# Patient Record
Sex: FEMALE | Race: BLACK OR AFRICAN AMERICAN | Employment: FULL TIME | URBAN - METROPOLITAN AREA
[De-identification: names, ages, dates, MRNs, and addresses within clinical notes are randomized per-mention and may not be internally consistent; named-entity substitution may affect disease eponyms.]

---

## 2017-02-21 ENCOUNTER — HOSPITAL ENCOUNTER (OUTPATIENT)
Dept: MAMMOGRAPHY | Age: 37
Discharge: HOME | End: 2017-02-21
Attending: INTERNAL MEDICINE
Payer: COMMERCIAL

## 2017-02-21 DIAGNOSIS — Z12.31 VISIT FOR SCREENING MAMMOGRAM: ICD-10-CM

## 2017-02-21 PROCEDURE — 77067 SCR MAMMO BI INCL CAD: CPT

## 2017-10-30 ENCOUNTER — HOSPITAL ENCOUNTER (OUTPATIENT)
Dept: SURGERY | Age: 37
Discharge: HOME | End: 2017-10-30
Attending: OBSTETRICS & GYNECOLOGY
Payer: COMMERCIAL

## 2017-10-30 VITALS
TEMPERATURE: 97.6 F | OXYGEN SATURATION: 99 % | SYSTOLIC BLOOD PRESSURE: 153 MMHG | DIASTOLIC BLOOD PRESSURE: 76 MMHG | WEIGHT: 280.25 LBS | HEART RATE: 78 BPM | BODY MASS INDEX: 47.85 KG/M2 | HEIGHT: 64 IN | RESPIRATION RATE: 16 BRPM

## 2017-10-30 LAB — HGB BLD-MCNC: 12.1 G/DL (ref 11.7–15.4)

## 2017-10-30 PROCEDURE — 85018 HEMOGLOBIN: CPT | Performed by: ANESTHESIOLOGY

## 2017-10-30 RX ORDER — PHENTERMINE HYDROCHLORIDE 37.5 MG/1
37.5 TABLET ORAL
COMMUNITY
Start: 2017-10-01 | End: 2017-10-30

## 2017-10-30 RX ORDER — NAPROXEN 500 MG/1
500 TABLET ORAL AS NEEDED
COMMUNITY
Start: 2017-09-13 | End: 2018-01-04

## 2017-10-30 RX ORDER — BISMUTH SUBSALICYLATE 262 MG
1 TABLET,CHEWABLE ORAL DAILY
COMMUNITY
End: 2019-12-16

## 2017-10-30 NOTE — PERIOP NOTES
Patient verified name, , and surgery as listed in [facility]. Type 2 surgery, walk in assessment complete. Labs per surgeon: none;   Labs per anesthesia protocol: hemoglobin; results pending  EKG: none needed per protocol    Hibiclens and instructions given per hospital policy. Patient provided with and instructed on educational handouts including Guide to Surgery, Pain Management, Hand Hygiene, Blood Transfusion Education, and [facility] Brochure. Patient answered medical/surgical history questions at their best of ability. All prior to admission medications documented in [facility]. Original medication prescription bottle YES visualized during patient appointment. Patient instructed to hold all vitamins 7 days prior to surgery and NSAIDS 5 days prior to surgery, patient verbalized understanding. Medications to be held: Naproxen hold for 5 days prior to surgery    Patient instructed to continue previous medications as prescribed prior to surgery and to take the following medications the day of surgery according to anesthesia guidelines with a small sip of water: Zoloft. Patient teach back successful and patient demonstrates knowledge of instructions.

## 2017-10-30 NOTE — PERIOP NOTES
HGB done today WNL    Recent Results (from the past 12 hour(s))   HEMOGLOBIN    Collection Time: 10/30/17  3:19 PM   Result Value Ref Range    HGB 12.1 11.7 - 15.4 g/dL

## 2017-11-06 NOTE — H&P
Gynecology History and Physical    Name: Fior Garcia MRN: [id number] SSN: [id number]    YOB: 1980  Age: 44 y.o. Sex: female       Subjective:      Chief complaint:  Abnormal uterine bleeding and Dysmenorrhea    Mellissa Jain is a 44 y.o.  female with a history of abnormal uterine bleeding and dysmenorrhea. Previous workup included Ultrasound which revealed fibroid(s) multiple small. Previous treatment measures included hormone therapy, nonsteroidal anti-inflammatory drugs (NSAIDs) and progesterone intrauterine device. She is admitted for Procedure(s) (LRB):  HYSTERECTOMY ROBOTIC ASSISTED WITH BILATERAL SALPINGECTOMY (N/A). The current method of family planning is abstinence. OB History      Para Term  AB Living    2         SAB TAB Ectopic Molar Multiple Live Births         2        Past Medical History:   Diagnosis Date    DVT of leg (deep venous thrombosis) (HCC)     Thought to be secondary to OCP use    Encounter for insertion of mirena IUD 2016    GERD (gastroesophageal reflux disease)     H/O seasonal allergies     Hypertension     past h/o Hypertension. Currently not on medication    Hypertension     h/o before , when she had DVT     Irregular periods     heavy periods    Menstrual migraine without status migrainosus, not intractable 10/19/2015    Migraines     around menstrul cycle    Morbid obesity ([facility] [address]     Psychiatric disorder     anxiety    Pulmonary embolus (HCC)     Thought to be secondary to OCP use    Sleep disorder     problems sleeping     Past Surgical History:   Procedure Laterality Date    BIOPSY OF BREAST, INCISIONAL      right    BIOPSY OF BREAST, NEEDLE CORE      left    HX BREAST LUMPECTOMY Right     BENIGN    HX CHOLECYSTECTOMY      HX TONSILLECTOMY N/A     as a child      Social History     Occupational History    Not on file.      Social History Main Topics    Smoking status: Former Smoker     Years: 2.00  Smokeless tobacco: Former User      Comment: vapor at one time     Alcohol use Yes      Comment: occasional, wine     Drug use: No    Sexual activity: Yes     Partners: Male     Birth control/ protection: , Condom     Family History   Problem Relation Age of Onset    Breast Cancer Maternal Grandmother     Hypertension Maternal Grandmother     High Cholesterol Mother     Heart Disease Maternal Grandfather     Alzheimer Paternal Grandmother         Allergies   Allergen Reactions    Lisinopril Other (comments)     Intolerance to side effects    Lorabid [Loracarbef] Other (comments)     PT IS NOT ALLERGIC    Lortab [Hydrocodone-Acetaminophen] Nausea and Vomiting     Prior to Admission medications    Medication Sig Start Date End Date Taking? Authorizing Provider   naproxen (NAPROSYN) 500 mg tablet Take 500 mg by mouth as needed. Indications: Pain 9/13/17   Historical Provider   multivitamin (ONE A DAY) tablet Take 1 Tab by mouth daily. Indications: VITAMIN DEFICIENCY PREVENTION    Historical Provider   OTHER daily. Black Seed oil    Historical Provider   norethindrone acetate (AYGESTIN) 5 mg tablet Take 2 Tabs by mouth daily. Patient taking differently: Take 10 mg by mouth daily. Indications: ABNORMAL UTERINE BLEEDING DUE TO HORMONAL IMBALANCE 10/19/17   Daniel Cox MD   sertraline (ZOLOFT) 25 mg tablet Take 25 mg by mouth daily. Take / use AM day of surgery  per anesthesia protocols. Indications: Generalized Anxiety Disorder    Historical Provider   phentermine (ADIPEX-P) 37.5 mg capsule Take 37.5 mg by mouth every morning. Historical Provider        Review of Systems:  A comprehensive review of systems was negative except for that written in the History of Present Illness. Objective: There were no vitals filed for this visit. Physical Exam:  Patient without distress.   Heart: Regular rate and rhythm  Lung: clear to auscultation throughout lung fields, no wheezes, no rales, no rhonchi and normal respiratory effort    Assessment:     Active Problems:    * No active hospital problems. *     Abnormal uterine bleeding with dysmenorrhea    Plan:     Procedure(s) (LRB):  HYSTERECTOMY ROBOTIC ASSISTED WITH BILATERAL SALPINGECTOMY (N/A)  Discussed the risks of surgery including the risks of bleeding, infection, deep vein thrombosis, and surgical injuries to internal organs including but not limited to the bowels, bladder, rectum, and female reproductive organs. The patient understands the risks; any and all questions were answered to the patient's satisfaction.     Signed By:  Faisal Horton MD     November 6, 2017

## 2017-11-08 ENCOUNTER — ANESTHESIA EVENT (OUTPATIENT)
Dept: SURGERY | Age: 37
End: 2017-11-08
Payer: COMMERCIAL

## 2017-11-09 ENCOUNTER — HOSPITAL ENCOUNTER (OUTPATIENT)
Age: 37
Discharge: HOME | End: 2017-11-10
Attending: OBSTETRICS & GYNECOLOGY | Admitting: OBSTETRICS & GYNECOLOGY
Payer: COMMERCIAL

## 2017-11-09 ENCOUNTER — ANESTHESIA (OUTPATIENT)
Dept: SURGERY | Age: 37
End: 2017-11-09
Payer: COMMERCIAL

## 2017-11-09 DIAGNOSIS — N94.6 DYSMENORRHEA: Primary | ICD-10-CM

## 2017-11-09 DIAGNOSIS — N92.0 MENORRHAGIA WITH REGULAR CYCLE: ICD-10-CM

## 2017-11-09 PROBLEM — N93.9 ABNORMAL UTERINE BLEEDING (AUB): Status: ACTIVE | Noted: 2017-11-09

## 2017-11-09 LAB
ABO + RH BLD: NORMAL
BLOOD GROUP ANTIBODIES SERPL: NORMAL
HCG UR QL: NEGATIVE
SPECIMEN EXP DATE BLD: NORMAL

## 2017-11-09 PROCEDURE — 77030032490 HC SLV COMPR SCD KNE COVD -B: Performed by: OBSTETRICS & GYNECOLOGY

## 2017-11-09 PROCEDURE — 74011000250 HC RX REV CODE- 250: Performed by: OBSTETRICS & GYNECOLOGY

## 2017-11-09 PROCEDURE — 77030022704 HC SUT VLOC COVD -B: Performed by: OBSTETRICS & GYNECOLOGY

## 2017-11-09 PROCEDURE — 77030008771 HC TU NG SALEM SUMP -A: Performed by: ANESTHESIOLOGY

## 2017-11-09 PROCEDURE — 77030031139 HC SUT VCRL2 J&J -A: Performed by: OBSTETRICS & GYNECOLOGY

## 2017-11-09 PROCEDURE — 77030002933 HC SUT MCRYL J&J -A: Performed by: OBSTETRICS & GYNECOLOGY

## 2017-11-09 PROCEDURE — 77030010545: Performed by: OBSTETRICS & GYNECOLOGY

## 2017-11-09 PROCEDURE — 74011250636 HC RX REV CODE- 250/636

## 2017-11-09 PROCEDURE — 77030031492 HC PRT ACC BLNT AIRSEAL CNMD -B: Performed by: OBSTETRICS & GYNECOLOGY

## 2017-11-09 PROCEDURE — 86900 BLOOD TYPING SEROLOGIC ABO: CPT | Performed by: ANESTHESIOLOGY

## 2017-11-09 PROCEDURE — 74011250637 HC RX REV CODE- 250/637: Performed by: ANESTHESIOLOGY

## 2017-11-09 PROCEDURE — 74011250636 HC RX REV CODE- 250/636: Performed by: OBSTETRICS & GYNECOLOGY

## 2017-11-09 PROCEDURE — 77030020782 HC GWN BAIR PAWS FLX 3M -B: Performed by: ANESTHESIOLOGY

## 2017-11-09 PROCEDURE — 77030035277 HC OBTRTR BLDELSS DISP INTU -B: Performed by: OBSTETRICS & GYNECOLOGY

## 2017-11-09 PROCEDURE — 74011000250 HC RX REV CODE- 250

## 2017-11-09 PROCEDURE — 77030016151 HC PROTCTR LNS DFOG COVD -B: Performed by: OBSTETRICS & GYNECOLOGY

## 2017-11-09 PROCEDURE — 77030005520 HC CATH URETH FOL38 BARD -A: Performed by: OBSTETRICS & GYNECOLOGY

## 2017-11-09 PROCEDURE — 77030010507 HC ADH SKN DERMBND J&J -B: Performed by: OBSTETRICS & GYNECOLOGY

## 2017-11-09 PROCEDURE — 77030011640 HC PAD GRND REM COVD -A: Performed by: OBSTETRICS & GYNECOLOGY

## 2017-11-09 PROCEDURE — 77030008756 HC TU IRR SUC STRY -B: Performed by: OBSTETRICS & GYNECOLOGY

## 2017-11-09 PROCEDURE — 76210000016 HC OR PH I REC 1 TO 1.5 HR: Performed by: OBSTETRICS & GYNECOLOGY

## 2017-11-09 PROCEDURE — 88307 TISSUE EXAM BY PATHOLOGIST: CPT | Performed by: OBSTETRICS & GYNECOLOGY

## 2017-11-09 PROCEDURE — 76060000035 HC ANESTHESIA 2 TO 2.5 HR: Performed by: OBSTETRICS & GYNECOLOGY

## 2017-11-09 PROCEDURE — 36415 COLL VENOUS BLD VENIPUNCTURE: CPT | Performed by: OBSTETRICS & GYNECOLOGY

## 2017-11-09 PROCEDURE — 74011250637 HC RX REV CODE- 250/637: Performed by: OBSTETRICS & GYNECOLOGY

## 2017-11-09 PROCEDURE — 77030018846 HC SOL IRR STRL H20 ICUM -A: Performed by: OBSTETRICS & GYNECOLOGY

## 2017-11-09 PROCEDURE — 94760 N-INVAS EAR/PLS OXIMETRY 1: CPT

## 2017-11-09 PROCEDURE — 81025 URINE PREGNANCY TEST: CPT

## 2017-11-09 PROCEDURE — 77030035044 HC TRCR ENDOSC VRSPRT BLDLSS COVD -C: Performed by: OBSTETRICS & GYNECOLOGY

## 2017-11-09 PROCEDURE — 77030008703 HC TU ET UNCUF COVD -A: Performed by: ANESTHESIOLOGY

## 2017-11-09 PROCEDURE — 77030018836 HC SOL IRR NACL ICUM -A: Performed by: OBSTETRICS & GYNECOLOGY

## 2017-11-09 PROCEDURE — 77030035029 HC NDL INSUF VERES DISP COVD -B: Performed by: OBSTETRICS & GYNECOLOGY

## 2017-11-09 PROCEDURE — 74011250636 HC RX REV CODE- 250/636: Performed by: ANESTHESIOLOGY

## 2017-11-09 PROCEDURE — 74011000250 HC RX REV CODE- 250: Performed by: ANESTHESIOLOGY

## 2017-11-09 PROCEDURE — 76010000876 HC OR TIME 2 TO 2.5HR INTENSV - TIER 2: Performed by: OBSTETRICS & GYNECOLOGY

## 2017-11-09 PROCEDURE — 77030000038 HC TIP SCIS LAPSCP SURI -B: Performed by: OBSTETRICS & GYNECOLOGY

## 2017-11-09 PROCEDURE — 77030008477 HC STYL SATN SLP COVD -A: Performed by: ANESTHESIOLOGY

## 2017-11-09 PROCEDURE — 74011258636 HC RX REV CODE- 258/636: Performed by: OBSTETRICS & GYNECOLOGY

## 2017-11-09 PROCEDURE — 77030019908 HC STETH ESOPH SIMS -A: Performed by: ANESTHESIOLOGY

## 2017-11-09 RX ORDER — DIPHENHYDRAMINE HYDROCHLORIDE 50 MG/ML
12.5 INJECTION, SOLUTION INTRAMUSCULAR; INTRAVENOUS ONCE
Status: DISCONTINUED | OUTPATIENT
Start: 2017-11-09 | End: 2017-11-09 | Stop reason: HOSPADM

## 2017-11-09 RX ORDER — HEPARIN SODIUM 5000 [USP'U]/ML
5000 INJECTION, SOLUTION INTRAVENOUS; SUBCUTANEOUS EVERY 12 HOURS
Status: DISCONTINUED | OUTPATIENT
Start: 2017-11-09 | End: 2017-11-10 | Stop reason: HOSPADM

## 2017-11-09 RX ORDER — DEXTROSE, SODIUM CHLORIDE, SODIUM LACTATE, POTASSIUM CHLORIDE, AND CALCIUM CHLORIDE 5; .6; .31; .03; .02 G/100ML; G/100ML; G/100ML; G/100ML; G/100ML
75 INJECTION, SOLUTION INTRAVENOUS CONTINUOUS
Status: DISCONTINUED | OUTPATIENT
Start: 2017-11-09 | End: 2017-11-10 | Stop reason: HOSPADM

## 2017-11-09 RX ORDER — SODIUM CHLORIDE 0.9 % (FLUSH) 0.9 %
5-10 SYRINGE (ML) INJECTION AS NEEDED
Status: DISCONTINUED | OUTPATIENT
Start: 2017-11-09 | End: 2017-11-09 | Stop reason: HOSPADM

## 2017-11-09 RX ORDER — HYDROMORPHONE HYDROCHLORIDE 2 MG/ML
0.5 INJECTION, SOLUTION INTRAMUSCULAR; INTRAVENOUS; SUBCUTANEOUS
Status: DISCONTINUED | OUTPATIENT
Start: 2017-11-09 | End: 2017-11-09 | Stop reason: HOSPADM

## 2017-11-09 RX ORDER — BUPIVACAINE HYDROCHLORIDE 5 MG/ML
INJECTION, SOLUTION EPIDURAL; INTRACAUDAL AS NEEDED
Status: DISCONTINUED | OUTPATIENT
Start: 2017-11-09 | End: 2017-11-09 | Stop reason: HOSPADM

## 2017-11-09 RX ORDER — KETOROLAC TROMETHAMINE 30 MG/ML
30 INJECTION, SOLUTION INTRAMUSCULAR; INTRAVENOUS EVERY 6 HOURS
Status: DISCONTINUED | OUTPATIENT
Start: 2017-11-09 | End: 2017-11-10 | Stop reason: HOSPADM

## 2017-11-09 RX ORDER — NEOSTIGMINE METHYLSULFATE 1 MG/ML
INJECTION INTRAVENOUS AS NEEDED
Status: DISCONTINUED | OUTPATIENT
Start: 2017-11-09 | End: 2017-11-09 | Stop reason: HOSPADM

## 2017-11-09 RX ORDER — SODIUM CHLORIDE, SODIUM LACTATE, POTASSIUM CHLORIDE, CALCIUM CHLORIDE 600; 310; 30; 20 MG/100ML; MG/100ML; MG/100ML; MG/100ML
100 INJECTION, SOLUTION INTRAVENOUS CONTINUOUS
Status: DISCONTINUED | OUTPATIENT
Start: 2017-11-09 | End: 2017-11-09 | Stop reason: HOSPADM

## 2017-11-09 RX ORDER — OXYCODONE AND ACETAMINOPHEN 10; 325 MG/1; MG/1
1 TABLET ORAL
Status: DISCONTINUED | OUTPATIENT
Start: 2017-11-09 | End: 2017-11-10 | Stop reason: HOSPADM

## 2017-11-09 RX ORDER — ROCURONIUM BROMIDE 10 MG/ML
INJECTION, SOLUTION INTRAVENOUS AS NEEDED
Status: DISCONTINUED | OUTPATIENT
Start: 2017-11-09 | End: 2017-11-09 | Stop reason: HOSPADM

## 2017-11-09 RX ORDER — ONDANSETRON 2 MG/ML
INJECTION INTRAMUSCULAR; INTRAVENOUS AS NEEDED
Status: DISCONTINUED | OUTPATIENT
Start: 2017-11-09 | End: 2017-11-09 | Stop reason: HOSPADM

## 2017-11-09 RX ORDER — SUCCINYLCHOLINE CHLORIDE 20 MG/ML
INJECTION INTRAMUSCULAR; INTRAVENOUS AS NEEDED
Status: DISCONTINUED | OUTPATIENT
Start: 2017-11-09 | End: 2017-11-09 | Stop reason: HOSPADM

## 2017-11-09 RX ORDER — SERTRALINE HYDROCHLORIDE 25 MG/1
25 TABLET, FILM COATED ORAL DAILY
Status: DISCONTINUED | OUTPATIENT
Start: 2017-11-10 | End: 2017-11-10 | Stop reason: HOSPADM

## 2017-11-09 RX ORDER — FAMOTIDINE 20 MG/1
20 TABLET, FILM COATED ORAL ONCE
Status: COMPLETED | OUTPATIENT
Start: 2017-11-09 | End: 2017-11-09

## 2017-11-09 RX ORDER — KETOROLAC TROMETHAMINE 30 MG/ML
INJECTION, SOLUTION INTRAMUSCULAR; INTRAVENOUS AS NEEDED
Status: DISCONTINUED | OUTPATIENT
Start: 2017-11-09 | End: 2017-11-09 | Stop reason: HOSPADM

## 2017-11-09 RX ORDER — FENTANYL CITRATE 50 UG/ML
25 INJECTION, SOLUTION INTRAMUSCULAR; INTRAVENOUS ONCE
Status: DISCONTINUED | OUTPATIENT
Start: 2017-11-09 | End: 2017-11-09 | Stop reason: HOSPADM

## 2017-11-09 RX ORDER — MIDAZOLAM HYDROCHLORIDE 1 MG/ML
2 INJECTION, SOLUTION INTRAMUSCULAR; INTRAVENOUS
Status: DISCONTINUED | OUTPATIENT
Start: 2017-11-09 | End: 2017-11-09 | Stop reason: HOSPADM

## 2017-11-09 RX ORDER — ONDANSETRON 4 MG/1
4 TABLET, ORALLY DISINTEGRATING ORAL
Status: DISCONTINUED | OUTPATIENT
Start: 2017-11-09 | End: 2017-11-10 | Stop reason: HOSPADM

## 2017-11-09 RX ORDER — SODIUM CHLORIDE 0.9 % (FLUSH) 0.9 %
5-10 SYRINGE (ML) INJECTION AS NEEDED
Status: DISCONTINUED | OUTPATIENT
Start: 2017-11-09 | End: 2017-11-10 | Stop reason: HOSPADM

## 2017-11-09 RX ORDER — SODIUM CHLORIDE 0.9 % (FLUSH) 0.9 %
5-10 SYRINGE (ML) INJECTION EVERY 8 HOURS
Status: DISCONTINUED | OUTPATIENT
Start: 2017-11-09 | End: 2017-11-09 | Stop reason: HOSPADM

## 2017-11-09 RX ORDER — OXYCODONE AND ACETAMINOPHEN 5; 325 MG/1; MG/1
1 TABLET ORAL AS NEEDED
Status: DISCONTINUED | OUTPATIENT
Start: 2017-11-09 | End: 2017-11-09 | Stop reason: HOSPADM

## 2017-11-09 RX ORDER — MIDAZOLAM HYDROCHLORIDE 1 MG/ML
2 INJECTION, SOLUTION INTRAMUSCULAR; INTRAVENOUS ONCE
Status: COMPLETED | OUTPATIENT
Start: 2017-11-09 | End: 2017-11-09

## 2017-11-09 RX ORDER — GLYCOPYRROLATE 0.2 MG/ML
INJECTION INTRAMUSCULAR; INTRAVENOUS AS NEEDED
Status: DISCONTINUED | OUTPATIENT
Start: 2017-11-09 | End: 2017-11-09 | Stop reason: HOSPADM

## 2017-11-09 RX ORDER — DEXAMETHASONE SODIUM PHOSPHATE 4 MG/ML
INJECTION, SOLUTION INTRA-ARTICULAR; INTRALESIONAL; INTRAMUSCULAR; INTRAVENOUS; SOFT TISSUE AS NEEDED
Status: DISCONTINUED | OUTPATIENT
Start: 2017-11-09 | End: 2017-11-09 | Stop reason: HOSPADM

## 2017-11-09 RX ORDER — DOCUSATE SODIUM 100 MG/1
100 CAPSULE, LIQUID FILLED ORAL 2 TIMES DAILY
Status: DISCONTINUED | OUTPATIENT
Start: 2017-11-10 | End: 2017-11-10 | Stop reason: HOSPADM

## 2017-11-09 RX ORDER — LIDOCAINE HYDROCHLORIDE 10 MG/ML
0.1 INJECTION INFILTRATION; PERINEURAL AS NEEDED
Status: DISCONTINUED | OUTPATIENT
Start: 2017-11-09 | End: 2017-11-09 | Stop reason: HOSPADM

## 2017-11-09 RX ORDER — CEFAZOLIN SODIUM IN 0.9 % NACL 2 G/50 ML
2 INTRAVENOUS SOLUTION, PIGGYBACK (ML) INTRAVENOUS ONCE
Status: DISCONTINUED | OUTPATIENT
Start: 2017-11-09 | End: 2017-11-09 | Stop reason: DRUGHIGH

## 2017-11-09 RX ORDER — FENTANYL CITRATE 50 UG/ML
INJECTION, SOLUTION INTRAMUSCULAR; INTRAVENOUS AS NEEDED
Status: DISCONTINUED | OUTPATIENT
Start: 2017-11-09 | End: 2017-11-09 | Stop reason: HOSPADM

## 2017-11-09 RX ORDER — LIDOCAINE HYDROCHLORIDE 20 MG/ML
INJECTION, SOLUTION EPIDURAL; INFILTRATION; INTRACAUDAL; PERINEURAL AS NEEDED
Status: DISCONTINUED | OUTPATIENT
Start: 2017-11-09 | End: 2017-11-09 | Stop reason: HOSPADM

## 2017-11-09 RX ORDER — PROPOFOL 10 MG/ML
INJECTION, EMULSION INTRAVENOUS AS NEEDED
Status: DISCONTINUED | OUTPATIENT
Start: 2017-11-09 | End: 2017-11-09 | Stop reason: HOSPADM

## 2017-11-09 RX ORDER — SODIUM CHLORIDE 0.9 % (FLUSH) 0.9 %
5-10 SYRINGE (ML) INJECTION EVERY 8 HOURS
Status: DISCONTINUED | OUTPATIENT
Start: 2017-11-09 | End: 2017-11-10 | Stop reason: HOSPADM

## 2017-11-09 RX ORDER — OXYCODONE HYDROCHLORIDE 5 MG/1
5 TABLET ORAL
Status: DISCONTINUED | OUTPATIENT
Start: 2017-11-09 | End: 2017-11-09 | Stop reason: HOSPADM

## 2017-11-09 RX ORDER — SODIUM CHLORIDE 9 MG/ML
50 INJECTION, SOLUTION INTRAVENOUS CONTINUOUS
Status: DISCONTINUED | OUTPATIENT
Start: 2017-11-09 | End: 2017-11-09 | Stop reason: HOSPADM

## 2017-11-09 RX ORDER — OXYCODONE AND ACETAMINOPHEN 5; 325 MG/1; MG/1
1 TABLET ORAL
Status: DISCONTINUED | OUTPATIENT
Start: 2017-11-09 | End: 2017-11-10 | Stop reason: HOSPADM

## 2017-11-09 RX ADMIN — FENTANYL CITRATE 50 MCG: 50 INJECTION, SOLUTION INTRAMUSCULAR; INTRAVENOUS at 11:27

## 2017-11-09 RX ADMIN — KETOROLAC TROMETHAMINE 30 MG: 30 INJECTION, SOLUTION INTRAMUSCULAR at 00:02

## 2017-11-09 RX ADMIN — ROCURONIUM BROMIDE 5 MG: 10 INJECTION, SOLUTION INTRAVENOUS at 10:22

## 2017-11-09 RX ADMIN — DEXAMETHASONE SODIUM PHOSPHATE 8 MG: 4 INJECTION, SOLUTION INTRA-ARTICULAR; INTRALESIONAL; INTRAMUSCULAR; INTRAVENOUS; SOFT TISSUE at 10:43

## 2017-11-09 RX ADMIN — OXYCODONE HYDROCHLORIDE AND ACETAMINOPHEN 1 TABLET: 10; 325 TABLET ORAL at 21:05

## 2017-11-09 RX ADMIN — FENTANYL CITRATE 100 MCG: 50 INJECTION, SOLUTION INTRAMUSCULAR; INTRAVENOUS at 10:18

## 2017-11-09 RX ADMIN — SODIUM CHLORIDE, SODIUM LACTATE, POTASSIUM CHLORIDE, AND CALCIUM CHLORIDE 100 ML/HR: 600; 310; 30; 20 INJECTION, SOLUTION INTRAVENOUS at 09:31

## 2017-11-09 RX ADMIN — SODIUM CHLORIDE, SODIUM LACTATE, POTASSIUM CHLORIDE, CALCIUM CHLORIDE, AND DEXTROSE MONOHYDRATE 75 ML/HR: 600; 310; 30; 20; 5 INJECTION, SOLUTION INTRAVENOUS at 13:53

## 2017-11-09 RX ADMIN — FENTANYL CITRATE 50 MCG: 50 INJECTION, SOLUTION INTRAMUSCULAR; INTRAVENOUS at 10:35

## 2017-11-09 RX ADMIN — OXYCODONE HYDROCHLORIDE AND ACETAMINOPHEN 1 TABLET: 5; 325 TABLET ORAL at 15:55

## 2017-11-09 RX ADMIN — LIDOCAINE HYDROCHLORIDE 0.1 ML: 10 INJECTION, SOLUTION INFILTRATION; PERINEURAL at 09:32

## 2017-11-09 RX ADMIN — Medication 5 ML: at 22:16

## 2017-11-09 RX ADMIN — HYDROMORPHONE HYDROCHLORIDE 0.5 MG: 2 INJECTION, SOLUTION INTRAMUSCULAR; INTRAVENOUS; SUBCUTANEOUS at 12:32

## 2017-11-09 RX ADMIN — HYDROMORPHONE HYDROCHLORIDE 0.5 MG: 2 INJECTION, SOLUTION INTRAMUSCULAR; INTRAVENOUS; SUBCUTANEOUS at 12:51

## 2017-11-09 RX ADMIN — ROCURONIUM BROMIDE 10 MG: 10 INJECTION, SOLUTION INTRAVENOUS at 11:37

## 2017-11-09 RX ADMIN — GLYCOPYRROLATE 0.8 MG: 0.2 INJECTION INTRAMUSCULAR; INTRAVENOUS at 12:03

## 2017-11-09 RX ADMIN — LIDOCAINE HYDROCHLORIDE 80 MG: 20 INJECTION, SOLUTION EPIDURAL; INFILTRATION; INTRACAUDAL; PERINEURAL at 10:22

## 2017-11-09 RX ADMIN — FAMOTIDINE 20 MG: 20 TABLET, FILM COATED ORAL at 09:31

## 2017-11-09 RX ADMIN — HYDROMORPHONE HYDROCHLORIDE 0.5 MG: 2 INJECTION, SOLUTION INTRAMUSCULAR; INTRAVENOUS; SUBCUTANEOUS at 12:27

## 2017-11-09 RX ADMIN — SODIUM CHLORIDE, SODIUM LACTATE, POTASSIUM CHLORIDE, AND CALCIUM CHLORIDE: 600; 310; 30; 20 INJECTION, SOLUTION INTRAVENOUS at 12:23

## 2017-11-09 RX ADMIN — HEPARIN SODIUM 5000 UNITS: 5000 INJECTION, SOLUTION INTRAVENOUS; SUBCUTANEOUS at 14:05

## 2017-11-09 RX ADMIN — ROCURONIUM BROMIDE 35 MG: 10 INJECTION, SOLUTION INTRAVENOUS at 10:30

## 2017-11-09 RX ADMIN — SODIUM CHLORIDE, SODIUM LACTATE, POTASSIUM CHLORIDE, AND CALCIUM CHLORIDE: 600; 310; 30; 20 INJECTION, SOLUTION INTRAVENOUS at 11:12

## 2017-11-09 RX ADMIN — SUCCINYLCHOLINE CHLORIDE 180 MG: 20 INJECTION INTRAMUSCULAR; INTRAVENOUS at 10:22

## 2017-11-09 RX ADMIN — NEOSTIGMINE METHYLSULFATE 5 MG: 1 INJECTION INTRAVENOUS at 12:03

## 2017-11-09 RX ADMIN — ONDANSETRON 4 MG: 2 INJECTION INTRAMUSCULAR; INTRAVENOUS at 11:52

## 2017-11-09 RX ADMIN — CEFAZOLIN 3 G: 1 INJECTION, POWDER, FOR SOLUTION INTRAMUSCULAR; INTRAVENOUS; PARENTERAL at 10:22

## 2017-11-09 RX ADMIN — PROPOFOL 200 MG: 10 INJECTION, EMULSION INTRAVENOUS at 10:22

## 2017-11-09 RX ADMIN — KETOROLAC TROMETHAMINE 30 MG: 30 INJECTION, SOLUTION INTRAMUSCULAR; INTRAVENOUS at 12:16

## 2017-11-09 RX ADMIN — KETOROLAC TROMETHAMINE 30 MG: 30 INJECTION, SOLUTION INTRAMUSCULAR at 17:13

## 2017-11-09 RX ADMIN — MIDAZOLAM HYDROCHLORIDE 2 MG: 1 INJECTION, SOLUTION INTRAMUSCULAR; INTRAVENOUS at 10:03

## 2017-11-09 NOTE — IP AVS SNAPSHOT
[address] [facility] 
[phone] Patient: Melia Mercedes MRN: [id number]:[id number] About your hospitalization You were admitted on:  November 9, 2017 You last received care in the:  [facility] You were discharged on:  November 10, 2017 Why you were hospitalized Your primary diagnosis was:  Menorrhagia Your diagnoses also included:  Abnormal Uterine Bleeding (Aub) Things You Need To Do (next 8 weeks) Follow up with Demario Bloom MD  
  
Phone:  [phone] Where:  [facility] [address] [place] [id number] Wednesday Nov 29, 2017 POST OP with Dawn Patel MD at 11:30 AM  
Where:  [address] Discharge Orders Procedure Order Date Status Priority Quantity Spec Type Associated Dx ACTIVITY AFTER DISCHARGE Patient should: Restrict sexual activity. , Restrict lifting, Restrict driving 04/01/64 2614 Normal Routine 1 Questions: Patient should:  Restrict sexual activity. Patient should:  Restrict lifting Patient should:  Restrict driving DIET REGULAR No added salt 11/10/17 0902 Normal Routine 1 Questions: Additional options:  No added salt A check julio indicates which time of day the medication should be taken. My Medications TAKE these medications as instructed Instructions Each Dose to Equal  
 Morning Noon Evening Bedtime ADIPEX-P 37.5 mg capsule Generic drug:  phentermine Your last dose was: Your next dose is: Take 37.5 mg by mouth every morning. 37.5 mg  
    
   
   
   
  
 aspirin 81 mg chewable tablet Your last dose was: Your next dose is: Take 1 Tab by mouth daily. 81 mg  
    
   
   
   
  
 multivitamin tablet Commonly known as:  ONE A DAY Your last dose was: Your next dose is: Take 1 Tab by mouth daily. Indications: VITAMIN DEFICIENCY PREVENTION  
 1 Tab * naproxen 500 mg tablet Commonly known as:  NAPROSYN Your last dose was: Your next dose is: Take 500 mg by mouth as needed. Indications: Pain 500 mg  
    
   
   
   
  
 * naproxen 250 mg tablet Commonly known as:  NAPROSYN Your last dose was: Your next dose is: Take 2 Tabs by mouth two (2) times daily (with meals). Indications: Pain 500 mg  
    
   
   
   
  
 OTHER Your last dose was: Your next dose is:    
   
   
 daily. Black Seed oil  
     
   
   
   
  
 oxyCODONE-acetaminophen 5-325 mg per tablet Commonly known as:  PERCOCET Your last dose was: Your next dose is: Take 1 Tab by mouth every four (4) hours as needed. Max Daily Amount: 6 Tabs. 1 Tab ZOLOFT 25 mg tablet Generic drug:  sertraline Your last dose was: Your next dose is: Take 25 mg by mouth daily. Take / use AM day of surgery  per anesthesia protocols. Indications: Generalized Anxiety Disorder 25 mg  
    
   
   
   
  
 * Notice: This list has 2 medication(s) that are the same as other medications prescribed for you. Read the directions carefully, and ask your doctor or other care provider to review them with you. ASK your physician about these medications Instructions Each Dose to Equal  
 Morning Noon Evening Bedtime  
 norethindrone acetate 5 mg tablet Commonly known as:  AYGESTIN Your last dose was: Your next dose is: Take 2 Tabs by mouth daily. 10 mg Where to Get Your Medications Information on where to get these meds will be given to you by the nurse or doctor. ! Ask your nurse or doctor about these medications  
  aspirin 81 mg chewable tablet  
 naproxen 250 mg tablet oxyCODONE-acetaminophen 5-325 mg per tablet Discharge Instructions DISCHARGE SUMMARY from Nurse PATIENT INSTRUCTIONS: 
 
After general anesthesia or intravenous sedation, for 24 hours or while taking prescription Narcotics: · Limit your activities · Do not drive and operate hazardous machinery · Do not make important personal or business decisions · Do  not drink alcoholic beverages · If you have not urinated within 8 hours after discharge, please contact your surgeon on call. Report the following to your surgeon: 
· Excessive pain, swelling, redness or odor of or around the surgical area · Temperature over 100.5 · Nausea and vomiting lasting longer than 4 hours or if unable to take medications · Any signs of decreased circulation or nerve impairment to extremity: change in color, persistent  numbness, tingling, coldness or increase pain · Any questions What to do at Home: 
Recommended activity: Activity as tolerated, no heavy lifting, bending, stooping, or straining. If you experience any of the following symptoms pain unrelieved by pain medication, nausea or vomiting lasting greater than four hours, saturating a pad in less than an hour, redness that is spreading from the incision sites, odor and drainage from the incision sites or temperature greater than 100.4, please follow up with your doctor. *  Please give a list of your current medications to your Primary Care Provider. *  Please update this list whenever your medications are discontinued, doses are 
    changed, or new medications (including over-the-counter products) are added. *  Please carry medication information at all times in case of emergency situations. These are general instructions for a healthy lifestyle: No smoking/ No tobacco products/ Avoid exposure to second hand smoke Surgeon General's Warning:  Quitting smoking now greatly reduces serious risk to your health. Obesity, smoking, and sedentary lifestyle greatly increases your risk for illness A healthy diet, regular physical exercise & weight monitoring are important for maintaining a healthy lifestyle You may be retaining fluid if you have a history of heart failure or if you experience any of the following symptoms:  Weight gain of 3 pounds or more overnight or 5 pounds in a week, increased swelling in our hands or feet or shortness of breath while lying flat in bed. Please call your doctor as soon as you notice any of these symptoms; do not wait until your next office visit. Recognize signs and symptoms of STROKE: 
 
F-face looks uneven A-arms unable to move or move unevenly S-speech slurred or non-existent T-time-call 911 as soon as signs and symptoms begin-DO NOT go Back to bed or wait to see if you get better-TIME IS BRAIN. Warning Signs of HEART ATTACK Call 911 if you have these symptoms: 
? Chest discomfort. Most heart attacks involve discomfort in the center of the chest that lasts more than a few minutes, or that goes away and comes back. It can feel like uncomfortable pressure, squeezing, fullness, or pain. ? Discomfort in other areas of the upper body. Symptoms can include pain or discomfort in one or both arms, the back, neck, jaw, or stomach. ? Shortness of breath with or without chest discomfort. ? Other signs may include breaking out in a cold sweat, nausea, or lightheadedness. Don't wait more than five minutes to call [address]! Fast action can save your life. Calling 911 is almost always the fastest way to get lifesaving treatment. Emergency Medical Services staff can begin treatment when they arrive  up to an hour sooner than if someone gets to the hospital by car. The discharge information has been reviewed with the patient. The patient verbalized understanding.  
Discharge medications reviewed with the patient and appropriate educational materials and side effects teaching were provided. ___________________________________________________________________________________________________________________________________ [employer] Announcement We are excited to announce that we are making your provider's discharge notes available to you in Shnergle. You will see these notes when they are completed and signed by the physician that discharged you from your recent hospital stay. If you have any questions or concerns about any information you see in Shnergle, please call the Health Information Department where you were seen or reach out to your Primary Care Provider for more information about your plan of care. Introducing [facility]! Dear Rodo Silva: Thank you for requesting a Shnergle account. Our records indicate that you already have an active Shnergle account. You can access your account anytime at [email or web address] Did you know that you can access your hospital and ER discharge instructions at any time in Shnergle? You can also review all of your test results from your hospital stay or ER visit. Additional Information If you have questions, please visit the Frequently Asked Questions section of the Shnergle website at [email or web address] Remember, Shnergle is NOT to be used for urgent needs. For medical emergencies, dial 911. Now available from your iPhone and Android! Providers Seen During Your Hospitalization Provider Specialty Primary office phone Jeremy Funez MD Obstetrics & Gynecology [phone] Your Primary Care Physician (PCP) Primary Care Physician Office Phone Office Fax Sandrita Tilleyo [phone] You are allergic to the following Allergen Reactions Lisinopril Other (comments) Intolerance to side effects Lorabid (Loracarbef) Other (comments)  PT IS NOT ALLERGIC  
    
 Lortab (Hydrocodone-Acetaminophen) Nausea and Vomiting Recent Documentation Height Weight BMI OB Status Smoking Status 1.626 m 127.7 kg 48.33 kg/m2 Unknown Former Smoker Emergency Contacts Name Discharge Info Relation Home Work Mobile Madelaine Medina [1] [phone] Patient Belongings The following personal items are in your possession at time of discharge: 
  Dental Appliances: None  Visual Aid: None      Home Medications: None   Jewelry: None  Clothing: None    Other Valuables: None (All personal items left mother Daly Alfonso) Discharge Instructions Attachments/References LAPAROSCOPIC HYSTERECTOMY: POST-OP (ENGLISH) SECONDHAND SMOKE (ENGLISH) HAND-WASHING (ENGLISH) Patient Handouts Laparoscopic Hysterectomy: What to Expect at [facility] Your Recovery A hysterectomy is surgery to take out the uterus. In somecases, the ovaries and fallopian tubes also are taken out at thesame time. You can expect to feel better and stronger each day, but you may need pain medicine for a week or two. You may get tired easily or have less energy than usual. The tiredness may last for several weeks after surgery. You will probably notice that your belly is swollen and puffy. This is common. The swelling will take several weeks to go down. You may take about 4 to 6 weeks to fully recover. It is important to avoid lifting while you are recovering so that you can heal. 
This care sheet gives you a general idea about how long it will take for you to recover. But each person recovers at a different pace. Follow the steps below to get better as quickly as possible. How can you care for yourself at home? Activity ? · Rest when you feel tired. ? · Be active. Walking is a good choice. ? · Allow the area to heal. Don't move quickly or lift anything heavy until you are feeling better. ? · You may shower 24 to 48 hours after surgery, if your doctor okays it. Pat the incision dry. Do not take a bath for the first 2 weeks, or until your doctor tells you it is okay. ? · Ask your doctor when it is okay for you to have sex. Diet ? · You can eat your normal diet. If your stomach is upset, try bland, low-fat foods like plain rice, broiled chicken, toast, and yogurt. ? · If your bowel movements are not regular right after surgery, try to avoid constipation and straining. Drink plenty of water. Your doctor may suggest fiber, a stool softener, or a mild laxative. Medicines ? · Your doctor will tell you if and when you can restart your medicines. He or she will also give you instructions about taking any new medicines. ? · If you take blood thinners, such as warfarin (Coumadin), clopidogrel (Plavix), or aspirin, be sure to talk to your doctor. He or she will tell you if and when to start taking those medicines again. Make sure that you understand exactly what your doctor wants you to do. ? · Be safe with medicines. Read and follow all instructions on the label. ¨ If the doctor gave you a prescription medicine for pain, take it as prescribed. ¨ If you are not taking a prescription pain medicine, ask your doctor if you can take an over-the-counter medicine. ? · If your doctor prescribed antibiotics, take them as directed. Do not stop taking them just because you feel better. You need to take the full course of antibiotics. Incision care ? · You may have stitches over the cuts (incisions) the doctor made in your belly. ? · If you have strips of tape on the cut (incision) the doctor made, leave the tape on for a week or until it falls off.  
? · Wash the area daily with warm, soapy water, and pat it dry. Don't use hydrogen peroxide or alcohol. They can slow healing. ? · You may cover the area with a gauze bandage if it oozes fluid or rubs against clothing. ? · Change the bandage every day. Other instructions ? · You may have some light vaginal bleeding. Wear sanitary pads if needed. Do not douche or use tampons. ·  
? · Don't have sex until the doctor says it is okay. Follow-up care is a key part of your treatment and safety. Be sure to make and go to all appointments, and call your doctor if you are having problems. It's also a good idea to know your test results and keep a list of the medicines you take. When should you call for help? Call 911 anytime you think you may need emergency care. For example, call if: 
? · You passed out (lost consciousness). ? · You have chest pain, are short of breath, or cough up blood. ?Call your doctor now or seek immediate medical care if: 
? · You have pain that does not get better after you take pain medicine. ? · You cannot pass stoolsl or gas. ? · You have vaginal discharge that has increased in amount or smells bad.  
? · You are sick to your stomach or cannot drink fluids. ? · You have loose stitches, or your incision comes open. ? · Bright red blood has soaked through the bandage over your incision. ? · You have signs of infection, such as: 
¨ Increased pain, swelling, warmth, or redness. ¨ Red streaks leading from the incision. ¨ Pus draining from the incision. ¨ A fever. ? · You have bright red vaginal bleeding that soaks one or more pads in an hour, or you have large clots. ? · You have signs of a blood clot in your leg (called a deep vein thrombosis), such as: 
¨ Pain in your calf, back of the knee, thigh, or groin. ¨ Redness and swelling in your leg or groin. ? Watch closely for changes in your health, and be sure to contact your doctor if you have any problems. Where can you learn more? Go to http://jerod-shasha.info/. Enter Q131 in the search box to learn more about \"Laparoscopic Hysterectomy: What to Expect at Home. \" Current as of: October 13, 2016 Content Version: 11.4 © 4198-3146 Healthwise, [employer]. Care instructions adapted under license by [employer] (which disclaims liability or warranty for this information). If you have questions about a medical condition or this instruction, always ask your healthcare professional. [address] any warranty or liability for your use of this information. Secondhand Smoke: Care Instructions Your Care Instructions Secondhand smoke comes from the burning end of a cigarette, cigar, or pipe and the smoke that a smoker exhales. The smoke contains nicotine and many other harmful chemicals. Breathing secondhand smoke can cause or worsen health problems including cancer, asthma, coronary artery disease, and respiratory infections. It can make your eyes and nose burn and cause a sore throat. Secondhand smoke is especially bad for babies and young children whose lungs are still developing. Babies whose parents smoke are more likely to have ear infections, pneumonia, and bronchitis in the first few years of their lives. Secondhand smoke can make asthma symptoms worse in children. If you are pregnant, it is important that you not smoke and that you avoid secondhand smoke. You are more likely to give birth to a baby who weighs less than expected (low birth weight) if you smoke. And your baby may have a greater risk for sudden infant death syndrome (SIDS). Babies whose mothers are exposed to secondhand smoke during pregnancy have a higher risk for health problems. Follow-up care is a key part of your treatment and safety. Be sure to make and go to all appointments, and call your doctor if you are having problems. It's also a good idea to know your test results and keep a list of the medicines you take. How can you care for yourself at home? · Do not smoke or let anyone else smoke in your home. If people must smoke, ask them to go outside. · If people do smoke in your home, choose a room where you can open a window or use a fan to get the smoke outside. · Do not let anyone smoke in your car. If someone must smoke, pull over in a safe place and let him or her smoke away from the car. · Ask your employer to make sure that you have a smoke-free work area. · Make sure that your children are not exposed to secondhand smoke at day care, school, and after-school programs. · Try to choose nonsmoking bars, restaurants, and other public places when you go out. · Help your family and friends who smoke to quit by encouraging them to try. Tell them about treatment resources. Having support from others often helps. · If you smoke, quit. Quitting is hard, but there are ways to boost your chance of quitting tobacco for good. ¨ Use nicotine gum, patches, or lozenges. Call a quitline. Ask your doctor about stop-smoking programs and medicines. ¨ Keep trying. When should you call for help? Watch closely for changes in your health, and be sure to contact your doctor if you have any problems. Where can you learn more? Go to http://jerodPersonetashasha.info/. Enter L004 in the search box to learn more about \"Secondhand Smoke: Care Instructions. \" Current as of: March 20, 2017 Content Version: 11.4 © 3149-4662 [employer]. Care instructions adapted under license by [employer] (which disclaims liability or warranty for this information). If you have questions about a medical condition or this instruction, always ask your healthcare professional. [address] any warranty or liability for your use of this information. Hand-Washing: Care Instructions Your Care Instructions It is important for caregivers to wash their hands properly. This is the single best way to prevent the spread of infections. Hand-washing can help keep you from getting sick. It is easy, doesn't cost much, and it works. Make sure that you and your caregivers follow safe hand-washing routines. Caregivers may include health care workers or family members at home or in a care facility. You can talk to them about this information on hand-washing. Follow-up care is a key part of your treatment and safety. Be sure to make and go to all appointments, and call your doctor if you are having problems. It's also a good idea to know your test results and keep a list of the medicines you take. How can you care for yourself at home? · Caregivers should wash their hands with soap and water: ¨ When their hands are dirty, especially after being exposed to body fluids. This includes blood. ¨ When their hands may have been exposed to germs that could spread infection. ¨ After they touch broken skin, sores, or wound bandages. ¨ After they use the bathroom. · At other times, caregivers can use an alcohol-based gel  or soap and water to clean hands. This should be done: ¨ Before and after any contact with you. ¨ After they take off gloves. ¨ Before they handle a device that touches your body (even if gloves are used). ¨ After they touch any objects near you, such as medical equipment, lights, or doorknobs. ¨ Before they handle medicine or prepare food. Proper hand-washing for caregivers · When using an alcohol-based gel , fill your palm with the gel. Then spread it all over your hands. Rub your hands together until they are dry. · When washing hands with soap and water: ¨ Wet your hands with running water, and apply soap. ¨ Rub your hands together to make a lather. Scrub well for at least 20 seconds. ¨ Pay special attention to your wrists, the backs of your hands, between your fingers, and under your fingernails. ¨ Rinse your hands well under running water. ¨ Use a clean towel to dry your hands, or air-dry your hands.  You may want to use a clean towel as a barrier between the faucet and your clean hands when you turn off the water. · If you use bar soap, use small bars. Set the soap on a rack that lets water drain. Where can you learn more? Go to http://jerod-shasha.info/. Enter X750 in the search box to learn more about \"Hand-Washing: Care Instructions. \" Current as of: March 3, 2017 Content Version: 11.4 © 2006-2017 [employer]. Care instructions adapted under license by [employer] (which disclaims liability or warranty for this information). If you have questions about a medical condition or this instruction, always ask your healthcare professional. [address] any warranty or liability for your use of this information. Please provide this summary of care documentation to your next provider. Signatures-by signing, you are acknowledging that this After Visit Summary has been reviewed with you and you have received a copy. Patient Signature:  ____________________________________________________________ Date:  ____________________________________________________________  
  
Bryn Hernandez Provider Signature:  ____________________________________________________________ Date:  ____________________________________________________________

## 2017-11-09 NOTE — PROGRESS NOTES
Problem: Falls - Risk of  Goal: *Absence of Falls  Document Miroslava Fall Risk and appropriate interventions in the flowsheet.    Outcome: Progressing Towards Goal  Fall Risk Interventions:            Medication Interventions: Patient to call before getting OOB, Teach patient to arise slowly

## 2017-11-09 NOTE — ANESTHESIA PREPROCEDURE EVALUATION
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   Neuro/Psych         Psychiatric history     Cardiovascular    Hypertension (no meds currently)              Exercise tolerance: >4 METS     GI/Hepatic/Renal     GERD: well controlled           Endo/Other        Morbid obesity     Other Findings              Physical Exam    Airway  Mallampati: II  TM Distance: > 6 cm  Neck ROM: normal range of motion   Mouth opening: Normal     Cardiovascular    Rhythm: regular  Rate: normal         Dental  No notable dental hx       Pulmonary  Breath sounds clear to auscultation               Abdominal         Other Findings            Anesthetic Plan    ASA: 3  Anesthesia type: general            Anesthetic plan and risks discussed with: Patient

## 2017-11-09 NOTE — IP AVS SNAPSHOT
Karen Garcia 
 
 
 [address] [id number] [facility] 
[phone] Patient: Garry Franco MRN: [id number]:[id number] My Medications TAKE these medications as instructed Instructions Each Dose to Equal  
 Morning Noon Evening Bedtime ADIPEX-P 37.5 mg capsule Generic drug:  phentermine Your last dose was: Your next dose is: Take 37.5 mg by mouth every morning. 37.5 mg  
    
   
   
   
  
 aspirin 81 mg chewable tablet Your last dose was: Your next dose is: Take 1 Tab by mouth daily. 81 mg  
    
   
   
   
  
 multivitamin tablet Commonly known as:  ONE A DAY Your last dose was: Your next dose is: Take 1 Tab by mouth daily. Indications: VITAMIN DEFICIENCY PREVENTION  
 1 Tab * naproxen 500 mg tablet Commonly known as:  NAPROSYN Your last dose was: Your next dose is: Take 500 mg by mouth as needed. Indications: Pain 500 mg  
    
   
   
   
  
 * naproxen 250 mg tablet Commonly known as:  NAPROSYN Your last dose was: Your next dose is: Take 2 Tabs by mouth two (2) times daily (with meals). Indications: Pain 500 mg  
    
   
   
   
  
 OTHER Your last dose was: Your next dose is:    
   
   
 daily. Black Seed oil  
     
   
   
   
  
 oxyCODONE-acetaminophen 5-325 mg per tablet Commonly known as:  PERCOCET Your last dose was: Your next dose is: Take 1 Tab by mouth every four (4) hours as needed. Max Daily Amount: 6 Tabs. 1 Tab ZOLOFT 25 mg tablet Generic drug:  sertraline Your last dose was: Your next dose is: Take 25 mg by mouth daily. Take / use AM day of surgery  per anesthesia protocols. Indications: Generalized Anxiety Disorder  25 mg  
    
   
   
   
  
 * Notice: This list has 2 medication(s) that are the same as other medications prescribed for you. Read the directions carefully, and ask your doctor or other care provider to review them with you. ASK your physician about these medications Instructions Each Dose to Equal  
 Morning Noon Evening Bedtime  
 norethindrone acetate 5 mg tablet Commonly known as:  AYGESTIN Your last dose was: Your next dose is: Take 2 Tabs by mouth daily. 10 mg Where to Get Your Medications Information on where to get these meds will be given to you by the nurse or doctor. ! Ask your nurse or doctor about these medications  
  aspirin 81 mg chewable tablet  
 naproxen 250 mg tablet  
 oxyCODONE-acetaminophen 5-325 mg per tablet

## 2017-11-09 NOTE — PROGRESS NOTES
Patient received from PACU to room 363 via bed. Patient alert & oriented x3, respirations easy & regular with sats  99% on room air. Pt assisted out of bed to the bathroom and voided 450 ml of yellow urine. Returned to bed. Assessment completed. Abdomen soft/tender, with 4 abdominal PS with dermabond c/d/i. Call light within reach, instructed to call for assistance as needed. Family members at bedside.

## 2017-11-09 NOTE — PROGRESS NOTES
TRANSFER - IN REPORT:    Verbal report received from Brandyn (name) on [address]  being received from PACU (unit) for routine post - op      Report consisted of patients Situation, Background, Assessment and   Recommendations(SBAR). Information from the following report(s) SBAR, Kardex and Intake/Output was reviewed with the receiving nurse. Opportunity for questions and clarification was provided. Assessment completed upon patients arrival to unit and care assumed.

## 2017-11-09 NOTE — OP NOTES
Operative Note    Patient: Estelle Swann MRN: [id number]  SSN: [id number]    YOB: 1980  Age: 44 y.o. Sex: female      Date of Surgery: 11/9/2017      Preoperative Diagnosis: Menorrhagia with regular cycle [N92.0], Uterine fibroids    Postoperative Diagnosis: Menorrhagia with regular cycle [N92.0], Uterine fibroids    Surgeon(s) and Role:     * Wanda Felty, MD - Primary       Anesthesia: General    Procedure: Total Robotic Assisted Laparoscopic Hysterectomy with Bilateral Salpingectomy greater than than 250 grams    Findings: enlarged uterus and normal BSO    Estimated Blood Loss: less than 50     Drains: none    Pathology/Specimens:     ID Type Source Tests Collected by Time Destination   1 : UTERUS, BILATERAL FALLOPIAN TUBES Fresh Uterus with Bilateral Fallopian Tubes  Wanda Felty, MD 11/9/2017 1139 Pathology       DVT Prophylaxis: SCD Hose    Antibiotic Prophylaxis: Ancef    Procedure in Detail:  After an adequate level of anesthesia was obtained, the patient was prepped and draped in the usual sterile fashion in the dorsal lithotomy position. Time out was performed and agreed upon by all present. Soler catheter was placed. Pneumatic compression devices were on a working. A weighted speculum was placed in vagina and the anterior aspect and the cervix was grasped with a tenaculum. The uterus was sounded to a depth of 12 cm and cervix dilated to allow manipulator to be placed with DONNA ring. The infraumbilical incision was injected with 0.5% marcaine and incision made and the Veress needle inserted. Position was verified with water drop and opening pressure of 4. The peritoneal cavity was insufflated to pressure of 15 with CO2. The 12 mm non-bladed trocar was placed. Under visualization one 8mm robotic lateral ports were placed on the right and a 8mm robotic and 5mm airseal port was placed on the left.   The robot was then attached to the trocars and the instruments were placed under visualization. I broke scrub and went to the console. Monopolar scissors and the PK were used as energy sources throughout the case. The right tube was elevated and detached and utero-ovarian ligaments were coagulated and cut. The round ligament was coagulated and cut. The bladder flap was continued to be developed. The broad ligament was coagulated and cut. The bladder flap was developed on the right side. The uterine vessels were then skeletonized and coagulated at the level of the internal os. The same procedure was repeated on the left. The cardinal ligaments were taken down to the vaginal portion of manipulator and this was circumscribed anteriorly and posteriorly. Hemostasis was noted. The uterus, tubes were removed. The vaginal cuff was closed with 2-0 V Loc in one layer running stich. The area was irrigated free of blood clots and debris. Hemostasis was noted at all points. The robot was detached. I again scrubbed, performed cystoscopy and bilateral ureteral jets were easily seen. Bladder was noted to be free of defects or foreign bodies. I changed gloved and performed laparoscopy and all areas remained hemostatic as CO2 pressure was lowered. I removed the trocars under visualization. 12 mm port closed with 2-0 vicryl on fascia and 4-0 monocryl for each of the port sites and then covered with DermaBond on the skin. All counts were reported as correct. Soler catheter was removed. The patient tolerated the procedure well. She was awakened and went to the recovery room in stable condition.        Signed By: Dilcia Brandon MD     November 9, 2017

## 2017-11-09 NOTE — PERIOP NOTES
TRANSFER - OUT REPORT:    Verbal report given to Jim Greenfield RN on Elisabeth Sit  being transferred to [address] [place] [address] for routine post - op       Report consisted of patients Situation, Background, Assessment and   Recommendations(SBAR). Information from the following report(s) OR Summary, Procedure Summary, Intake/Output and MAR was reviewed with the receiving nurse. Opportunity for questions and clarification was provided.       Patient transported with:   O2 @ 2 liters

## 2017-11-09 NOTE — ANESTHESIA POSTPROCEDURE EVALUATION
Post-Anesthesia Evaluation and Assessment    Patient: Tracy Lugo MRN: [id number]  SSN: [id number]    YOB: 1980  Age: 44 y.o. Sex: female       Cardiovascular Function/Vital Signs  Visit Vitals    /69    Pulse 87    Temp 37.3 °C (99.1 °F)    Resp 16    Ht 5' 4\" (1.626 m)    Wt 127.7 kg (281 lb 9 oz)    SpO2 94%    BMI 48.33 kg/m2       Patient is status post general anesthesia for Procedure(s):  ROBOTIC ASSISTED HYSTERECTOMY WITH BILATERAL SALPINGECTOMY, CYSTOSCOPY. Nausea/Vomiting: None    Postoperative hydration reviewed and adequate. Pain:  Pain Scale 1: Numeric (0 - 10) (11/09/17 1252)  Pain Intensity 1: 3 (11/09/17 1252)   Managed    Neurological Status:   Neuro (WDL): Exceptions to WDL (11/09/17 1222)  Neuro  Neurologic State: Drowsy (11/09/17 1222)  Orientation Level: Oriented X4 (11/09/17 1222)   At baseline    Mental Status and Level of Consciousness: Arousable    Pulmonary Status:   O2 Device: Nasal cannula (11/09/17 1252)   Adequate oxygenation and airway patent    Complications related to anesthesia: None    Post-anesthesia assessment completed.  No concerns    Signed By: Rock Terrie MD     November 9, 2017

## 2017-11-10 VITALS
TEMPERATURE: 98.8 F | DIASTOLIC BLOOD PRESSURE: 94 MMHG | HEART RATE: 90 BPM | BODY MASS INDEX: 48.07 KG/M2 | RESPIRATION RATE: 20 BRPM | SYSTOLIC BLOOD PRESSURE: 164 MMHG | OXYGEN SATURATION: 96 % | WEIGHT: 281.56 LBS | HEIGHT: 64 IN

## 2017-11-10 LAB
ERYTHROCYTE [DISTWIDTH] IN BLOOD BY AUTOMATED COUNT: 15.5 % (ref 11.9–14.6)
HCT VFR BLD AUTO: 36.1 % (ref 35.8–46.3)
HGB BLD-MCNC: 10.4 G/DL (ref 11.7–15.4)
MCH RBC QN AUTO: 23.2 PG (ref 26.1–32.9)
MCHC RBC AUTO-ENTMCNC: 28.8 G/DL (ref 31.4–35)
MCV RBC AUTO: 80.4 FL (ref 79.6–97.8)
PLATELET # BLD AUTO: 302 K/UL (ref 150–450)
PMV BLD AUTO: 9.8 FL (ref 10.8–14.1)
RBC # BLD AUTO: 4.49 M/UL (ref 4.05–5.25)
WBC # BLD AUTO: 8.2 K/UL (ref 4.3–11.1)

## 2017-11-10 PROCEDURE — 74011258636 HC RX REV CODE- 258/636: Performed by: OBSTETRICS & GYNECOLOGY

## 2017-11-10 PROCEDURE — 74011250636 HC RX REV CODE- 250/636: Performed by: OBSTETRICS & GYNECOLOGY

## 2017-11-10 PROCEDURE — 74011250637 HC RX REV CODE- 250/637: Performed by: OBSTETRICS & GYNECOLOGY

## 2017-11-10 PROCEDURE — 85027 COMPLETE CBC AUTOMATED: CPT | Performed by: OBSTETRICS & GYNECOLOGY

## 2017-11-10 PROCEDURE — 36415 COLL VENOUS BLD VENIPUNCTURE: CPT | Performed by: OBSTETRICS & GYNECOLOGY

## 2017-11-10 RX ORDER — NAPROXEN 250 MG/1
500 TABLET ORAL 2 TIMES DAILY WITH MEALS
Qty: 60 TAB | Refills: 0 | Status: SHIPPED | OUTPATIENT
Start: 2017-11-10 | End: 2017-11-29

## 2017-11-10 RX ORDER — OXYCODONE AND ACETAMINOPHEN 5; 325 MG/1; MG/1
1 TABLET ORAL
Qty: 28 TAB | Refills: 0 | Status: SHIPPED | OUTPATIENT
Start: 2017-11-10 | End: 2017-11-29

## 2017-11-10 RX ORDER — GUAIFENESIN 100 MG/5ML
81 LIQUID (ML) ORAL DAILY
Qty: 60 TAB | Refills: 0 | Status: SHIPPED
Start: 2017-11-10 | End: 2018-01-04

## 2017-11-10 RX ADMIN — SERTRALINE HYDROCHLORIDE 25 MG: 25 TABLET ORAL at 08:39

## 2017-11-10 RX ADMIN — Medication 5 ML: at 06:11

## 2017-11-10 RX ADMIN — SODIUM CHLORIDE, SODIUM LACTATE, POTASSIUM CHLORIDE, CALCIUM CHLORIDE, AND DEXTROSE MONOHYDRATE 75 ML/HR: 600; 310; 30; 20; 5 INJECTION, SOLUTION INTRAVENOUS at 02:03

## 2017-11-10 RX ADMIN — KETOROLAC TROMETHAMINE 30 MG: 30 INJECTION, SOLUTION INTRAMUSCULAR at 06:13

## 2017-11-10 RX ADMIN — OXYCODONE HYDROCHLORIDE AND ACETAMINOPHEN 1 TABLET: 10; 325 TABLET ORAL at 08:38

## 2017-11-10 RX ADMIN — HEPARIN SODIUM 5000 UNITS: 5000 INJECTION, SOLUTION INTRAVENOUS; SUBCUTANEOUS at 02:00

## 2017-11-10 RX ADMIN — DOCUSATE SODIUM 100 MG: 100 CAPSULE, LIQUID FILLED ORAL at 08:39

## 2017-11-10 NOTE — DISCHARGE INSTRUCTIONS
DISCHARGE SUMMARY from Nurse    PATIENT INSTRUCTIONS:    After general anesthesia or intravenous sedation, for 24 hours or while taking prescription Narcotics:  · Limit your activities  · Do not drive and operate hazardous machinery  · Do not make important personal or business decisions  · Do  not drink alcoholic beverages  · If you have not urinated within 8 hours after discharge, please contact your surgeon on call. Report the following to your surgeon:  · Excessive pain, swelling, redness or odor of or around the surgical area  · Temperature over 100.5  · Nausea and vomiting lasting longer than 4 hours or if unable to take medications  · Any signs of decreased circulation or nerve impairment to extremity: change in color, persistent  numbness, tingling, coldness or increase pain  · Any questions    What to do at Home:  Recommended activity: Activity as tolerated, no heavy lifting, bending, stooping, or straining. If you experience any of the following symptoms pain unrelieved by pain medication, nausea or vomiting lasting greater than four hours, saturating a pad in less than an hour, redness that is spreading from the incision sites, odor and drainage from the incision sites or temperature greater than 100.4, please follow up with your doctor. *  Please give a list of your current medications to your Primary Care Provider. *  Please update this list whenever your medications are discontinued, doses are      changed, or new medications (including over-the-counter products) are added. *  Please carry medication information at all times in case of emergency situations. These are general instructions for a healthy lifestyle:    No smoking/ No tobacco products/ Avoid exposure to second hand smoke  Surgeon General's Warning:  Quitting smoking now greatly reduces serious risk to your health.     Obesity, smoking, and sedentary lifestyle greatly increases your risk for illness    A healthy diet, regular physical exercise & weight monitoring are important for maintaining a healthy lifestyle    You may be retaining fluid if you have a history of heart failure or if you experience any of the following symptoms:  Weight gain of 3 pounds or more overnight or 5 pounds in a week, increased swelling in our hands or feet or shortness of breath while lying flat in bed. Please call your doctor as soon as you notice any of these symptoms; do not wait until your next office visit. Recognize signs and symptoms of STROKE:    F-face looks uneven    A-arms unable to move or move unevenly    S-speech slurred or non-existent    T-time-call 911 as soon as signs and symptoms begin-DO NOT go       Back to bed or wait to see if you get better-TIME IS BRAIN. Warning Signs of HEART ATTACK     Call 911 if you have these symptoms:   Chest discomfort. Most heart attacks involve discomfort in the center of the chest that lasts more than a few minutes, or that goes away and comes back. It can feel like uncomfortable pressure, squeezing, fullness, or pain.  Discomfort in other areas of the upper body. Symptoms can include pain or discomfort in one or both arms, the back, neck, jaw, or stomach.  Shortness of breath with or without chest discomfort.  Other signs may include breaking out in a cold sweat, nausea, or lightheadedness. Don't wait more than five minutes to call 911 - MINUTES MATTER! Fast action can save your life. Calling 911 is almost always the fastest way to get lifesaving treatment. Emergency Medical Services staff can begin treatment when they arrive -- up to an hour sooner than if someone gets to the hospital by car. The discharge information has been reviewed with the patient. The patient verbalized understanding.   Discharge medications reviewed with the patient and appropriate educational materials and side effects teaching were provided.   ___________________________________________________________________________________________________________________________________

## 2017-11-10 NOTE — PROGRESS NOTES
Gynecology Progress Note    Patient doing well post-op day 1 from Procedure(s):  ROBOTIC ASSISTED HYSTERECTOMY WITH BILATERAL SALPINGECTOMY, CYSTOSCOPY without significant complaints. Pain controlled on current medication. Voiding without difficulty. Patient is passing flatus. Vitals:  Blood pressure (!) 164/94, pulse 90, temperature 98.8 °F (37.1 °C), resp. rate 20, height 5' 4\" (1.626 m), weight 127.7 kg (281 lb 9 oz), last menstrual period 10/01/2017, SpO2 96 %. Temp (24hrs), Av.3 °F (36.8 °C), Min:96.6 °F (35.9 °C), Max:99.1 °F (37.3 °C)        Exam:  Patient without distress. Abdomen soft,  nontender. Incisions dry and clean without erythema. Lower extremities are negative for swelling, cords, or tenderness. Lab/Data Review:  CBC:   Lab Results   Component Value Date/Time    WBC 8.2 11/10/2017 05:53 AM    HGB 10.4 (L) 11/10/2017 05:53 AM    HCT 36.1 11/10/2017 05:53 AM     11/10/2017 05:53 AM       Assessment and Plan:  Patient appears to be having uncomplicated post Procedure(s):  ROBOTIC ASSISTED HYSTERECTOMY WITH BILATERAL SALPINGECTOMY, CYSTOSCOPY course. Continue routine post-op care.

## 2017-11-10 NOTE — PROGRESS NOTES
Problem: Falls - Risk of  Goal: *Absence of Falls  Document Miroslava Fall Risk and appropriate interventions in the flowsheet.    Outcome: Progressing Towards Goal  Fall Risk Interventions:            Medication Interventions: Teach patient to arise slowly

## 2017-11-10 NOTE — PROGRESS NOTES
Discharge instructions reviewed with patient and mother, all questions asked and answered. IV removed with tip intact. Pt to call when ready for a wheelchair.

## 2017-11-10 NOTE — DISCHARGE SUMMARY
Gynecology Discharge Summary     Name: Pam Johnson MRN: [id number]  SSN: [id number]    YOB: 1980  Age: 44 y.o. Sex: female      Admit Date: 11/9/2017    Discharge Date: 11/10/2017      Admitting Physician: Pierre Copeland MD     Discharge Physician: Pierre Copeland MD     * Admission Diagnoses: Menorrhagia with regular cycle [N92.0]    * Discharge Diagnoses:   Hospital Problems as of 11/10/2017  Date Reviewed: 11/6/2017          Codes Class Noted - Resolved POA    Abnormal uterine bleeding (AUB) ICD-10-CM: N93.9  ICD-9-CM: 626.9  11/9/2017 - Present Unknown        * (Principal)Menorrhagia ICD-10-CM: N92.0  ICD-9-CM: 626.2  10/19/2015 - Present Yes               * Procedures: Robotic Assisted Total Laparoscopic Hysterectomy and Bilateral Salpingectomy    Consults: None    * Discharge Condition: good    * Hospital Course:   Normal hospital course for this procedure. * Discharge Disposition: Home    Discharge Medications:   Current Discharge Medication List      START taking these medications    Details   oxyCODONE-acetaminophen (PERCOCET) 5-325 mg per tablet Take 1 Tab by mouth every four (4) hours as needed. Max Daily Amount: 6 Tabs. Qty: 28 Tab, Refills: 0    Associated Diagnoses: Menorrhagia with regular cycle      !! naproxen (NAPROSYN) 250 mg tablet Take 2 Tabs by mouth two (2) times daily (with meals). Indications: Pain  Qty: 60 Tab, Refills: 0      aspirin 81 mg chewable tablet Take 1 Tab by mouth daily. Qty: 60 Tab, Refills: 0       !! - Potential duplicate medications found. Please discuss with provider. CONTINUE these medications which have NOT CHANGED    Details   !! naproxen (NAPROSYN) 500 mg tablet Take 500 mg by mouth as needed. Indications: Pain      multivitamin (ONE A DAY) tablet Take 1 Tab by mouth daily. Indications: VITAMIN DEFICIENCY PREVENTION      OTHER daily. Black Seed oil      sertraline (ZOLOFT) 25 mg tablet Take 25 mg by mouth daily.  Take / use AM day of surgery per anesthesia protocols. Indications: Generalized Anxiety Disorder      phentermine (ADIPEX-P) 37.5 mg capsule Take 37.5 mg by mouth every morning. !! - Potential duplicate medications found. Please discuss with provider. * Follow-up Care/Patient Instructions: Activity: No sex, douching, or tampons for 6 weeks or as directed by your physician. No heavy lifting for 6 weeks. No driving while taking pain medication. Diet: Resume pre-hospital diet  Wound Care: Keep wound clean and dry    She is ambulating normally and instructed to be active and up walking. Will have her take 81mg ASA.     Follow-up Information     Follow up With Details Comments [id number] Tre Jones MD   [address] [facility] [address]  [phone]            Signed By:  Niyah Walker MD     November 10, 2017

## 2017-11-10 NOTE — PROGRESS NOTES
Patient with a MEWS score of 3. Resting quietly at present. . No s/s distress noted. Primary RN to monitor.

## 2017-11-10 NOTE — PROGRESS NOTES
Shift assessment completed via doc flow sheet. Patient is alert and oriented x 4. Respirations even and unlabored on room air. Lung sounds CTA bilaterally. Heart sounds S1, S2 auscultated and regular. Abdomen obese and tender with 4 PS with derma bonds that are c/d/i. Bowel sounds active to all 4 quadrants. IV fluids infusing without difficulty. Patient ambulates to bathroom as needed with assistance. Patient reported abdominal pain at a rate of 6/10 but wanted to walk before receiving pain med. No other needs voiced at this time. Bed is locked and in low position. Bed rails x 3. Family at bedside. Patient is encouraged to call for assistance. Call light within reach.

## 2017-11-10 NOTE — PROGRESS NOTES
Patient received percocet 10 mg PO at this time for c/o pain that she rated at 8/10. Will continue to monitor.

## 2017-12-18 PROBLEM — E66.01 OBESITY, MORBID (HCC): Status: ACTIVE | Noted: 2017-12-18

## 2018-03-20 ENCOUNTER — HOSPITAL ENCOUNTER (OUTPATIENT)
Dept: MAMMOGRAPHY | Age: 38
Discharge: HOME | End: 2018-03-20
Attending: OBSTETRICS & GYNECOLOGY
Payer: COMMERCIAL

## 2018-03-20 DIAGNOSIS — Z12.31 VISIT FOR SCREENING MAMMOGRAM: ICD-10-CM

## 2018-03-20 PROCEDURE — 77067 SCR MAMMO BI INCL CAD: CPT

## 2018-06-14 ENCOUNTER — HOSPITAL ENCOUNTER (OUTPATIENT)
Dept: PHYSICAL THERAPY | Age: 38
Discharge: HOME | End: 2018-06-14
Payer: COMMERCIAL

## 2018-06-14 PROCEDURE — 97140 MANUAL THERAPY 1/> REGIONS: CPT

## 2018-06-14 PROCEDURE — 97161 PT EVAL LOW COMPLEX 20 MIN: CPT

## 2018-06-14 NOTE — THERAPY EVALUATION
Sophie Hamilton  : 1980  Payor: Iman Henson / Plan: [place] [facility] / Product Type: PPO /  [address]  at [facility] [address]  1101 [facility], [address] [place] [address],[address], [address] [facility]  [phone]   [phone]       OUTPATIENT PHYSICAL THERAPY:Initial Assessment 2018     ICD-10: Treatment Diagnosis: Pain in right knee (M25.561)  Precautions/Allergies:   Lisinopril; Lorabid [loracarbef]; and Lortab [hydrocodone-acetaminophen]   Fall Risk Score: 0 (? 5 = High Risk)  MD Orders: evaluate and treat MEDICAL/REFERRING DIAGNOSIS:  R knee pain   DATE OF ONSET: 2.5 weeks ago  REFERRING PHYSICIAN: Romeo Beltrán MD  RETURN PHYSICIAN APPOINTMENT: TBD     INITIAL ASSESSMENT:  Pt presents with R knee pain. Pt demonstrates pain, slightly decreased ROM, and bilateral hip weakness which are limiting basic mobility and normal activity level. She will benefit from PT for guided rehab to promote normalized return of motion, strength, and function in order for safe return to routine activities without pain. PROBLEM LIST (Impacting functional limitations):  1. R knee pain  2. Decreased ROM R knee   3. Decreased functional strength R knee/LE   4. Decreased gait skills INTERVENTIONS PLANNED:    1. Thermal and electric modalities, manual therapies for pain. 2. Manual therapies and therapeutic exercises for ROM and strength. 3. Therapeutic exercises for gait and balance  4. Home exercise program   TREATMENT PLAN:  Effective Dates: 2018 TO 2018. Frequency/Duration: 2 visits per week for  8 weeks  GOALS: (Goals have been discussed and agreed upon with patient.)   Short-Term Functional Goals: Time Frame: 4 weeks  1. Report no more than minimal, intermittent 0-1/10 pain R knee with basic walking and ADL's, and score greater than 50/80 on the LEFS.   2. R quad strength is 5/5 with good terminal extension strength and no pain for stability with walking and progressing into strengthening phase.   3. Walking without limp on level surfaces and up stairs. Discharge Goals: Time Frame: 8 weeks  1. No significant pain R knee with all normalized daily home and work activities, and score greater than 60/80 on the LEFS. 2. Demonstrate R knee ROM equal to the L, and no pain when ambulating on treadmill x 30 minutes. 3. Able to balance with good control in single-leg stand greater than 15 seconds with eyes open, greater than 10 seconds with eyes closed for improved dynamic stability. 4. Independent with [facility] for advanced knee strengthening. Rehabilitation Potential For Stated Goals: Good    Regarding Kaylyn Medina's therapy, I certify that the treatment plan above will be carried out by a therapist or under their direction. Thank you for this referral,    Dolly Becerra PT                                                                                     The information in this section was collected on 6-14-18 (except where otherwise noted). HISTORY:   History of Present Injury/Illness (Reason for Referral):  Patient reports that she began experiencing a sharp pain in her R knee when ambulating for exercise on an inclined treadmill. Patient has had to cease her walking due to this knee pain and sought medical attention due to the increased pain when ambulating. Past Medical History/Comorbidities: Ms. Jocelyn Ngo  has a past medical history of DVT of leg (deep venous thrombosis) ([address] Encounter for insertion of mirena IUD (01/03/2016); GERD (gastroesophageal reflux disease); H/O seasonal allergies; Hypertension; Hypertension; Irregular periods; Menstrual migraine without status migrainosus, not intractable (10/19/2015); Migraines; Morbid obesity ([address] Psychiatric disorder; Pulmonary embolus ([address] and Sleep disorder. She also has no past medical history of Aneurysm ([address] Arrhythmia; Arthritis; Asthma; Autoimmune disease ([address] CAD (coronary artery disease); Cancer ([address]  Chronic kidney disease; Chronic obstructive pulmonary disease ([facility] Chronic pain; Coagulation disorder ([facility] Diabetes ([facility] Difficult intubation; Endocarditis; Heart failure ([facility] Liver disease; Malignant hyperthermia due to anesthesia; Nausea & vomiting; Nicotine vapor product user; Non-nicotine vapor product user; Pseudocholinesterase deficiency; PUD (peptic ulcer disease); Rheumatic fever; Seizures ([facility] Sleep apnea; Stroke [facility]); or Thyroid disease. Ms. Rehan Dozier  has a past surgical history that includes hx cholecystectomy (2000); pr biopsy of breast, incisional; pr biopsy of breast, needle core; hx tonsillectomy (N/A); hx gyn (11/09/2017); and hx breast biopsy (Bilateral). Social History/Living Environment: Patient lives with family in a two-story apartment which is on the first floor. 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 She was exercising 30 minutes at [employer] prior to symptom onset. Current Medications:       Current Outpatient Prescriptions:     ibuprofen (MOTRIN) 800 mg tablet, Take 1 Tab by mouth every eight (8) hours as needed for Pain., Disp: 30 Tab, Rfl: 0    cetirizine (ZYRTEC) 10 mg tablet, Take 1 Tab by mouth daily as needed for Allergies. , Disp: 30 Tab, Rfl: 0    multivitamin (ONE A DAY) tablet, Take 1 Tab by mouth daily. Indications: VITAMIN DEFICIENCY PREVENTION, Disp: , Rfl:     OTHER, daily. Black Seed oil, Disp: , Rfl:     sertraline (ZOLOFT) 25 mg tablet, Take 25 mg by mouth daily. Take / use AM day of surgery  per anesthesia protocols. Indications: Generalized Anxiety Disorder, Disp: , Rfl:    Date Last Reviewed:  6-14-18   Number of Personal Factors/Comorbidities that affect the Plan of Care: 1-2: MODERATE COMPLEXITY   EXAMINATION:   6-14-18  Observation/Orthostatic Postural Assessment: B genu valgus and bilateral pronation noted when standing and when shifting weight onto each foot. No swelling noted to R knee. Palpation: Tenderness to palpation of inferior pole of R patella and along patellar tendon. Minimal discomfort at medal and lateral joint line on R knee. Reduced R patellar inferior and superior glide as compared to uninvolved side. ROM:    L knee: 8 degrees of hyperextension to 120 degrees of flexoin    Bilateral hip flexion, abduction and extension range of motion within functional limits              R knee: 2 degrees of hyperextension to 120 degrees of flexion      R ankle dorsiflexion reduced via observation (no formal measures taken). Hx of R ankle fracture. Strength:   L hip flexion 5/5  L hip abduction 4/5  L hip extension 4/5  L knee extension 5/5  L knee flexion 5/5  L ankle DF 5/5  R hip flexion 5/5   R hip abduction 4-/5   R hip extension 4/5  R knee ext 4+/5 w/pain  R knee flexion 5/5  R ankle DF 5/5      Special Tests: 'Negative meniscal tests (Letha's, Apley's Compression/distraction, Ege's)  Neurological Screen: [place] neuro screen is unremarkable. Normal sensation to light touch along both LEs at all dermatomes. Functional Mobility:  Sit to/from Stand: independent. Bed Mobility: independent  Walking on level ground: mildly antalgic gait pattern with reduced stance time on R LE. Bilateral genu valgus and bilateral pronation. Stair walking: Reciprocal gait to ascend stairs with use of bilateral handrails but step-to gait pattern to descend stairs with use of bilateral handrails. Balance:  Single-leg stand: firm surface/eyes open L= 8 seconds, R= 5 seconds           Body Structures Involved:  1. Nerves  2. Bones  3. Joints  4. Muscles Body Functions Affected:  1. Sensory/Pain  2. Neuromusculoskeletal  3. Movement Related Activities and Participation Affected:  1. Mobility  2. Interpersonal Interactions and Relationships  3.  Community, Social and [place]   Number of elements (examined above) that affect the Plan of Care: 4+: HIGH COMPLEXITY   CLINICAL PRESENTATION: Presentation: Stable and uncomplicated: LOW COMPLEXITY   CLINICAL DECISION MAKING:   Outcome Measure: Tool Used: Lower Extremity Functional Scale (LEFS)  Score:  Initial: 31/80 Most Recent: X/80 (Date: -- )   Interpretation of Score: 20 questions each scored on a 5 point scale with 0 representing \"extreme difficulty or unable to perform\" and 4 representing \"no difficulty\". The lower the score, the greater the functional disability. 80/80 represents no disability. Minimal detectable change is 9 points. Medical Necessity:   · Skilled intervention continues to be required due to increased R knee pain which limits the patient's mobility. Reason for Services/Other Comments:  · Patient continues to require skilled intervention due to increased R knee pain. Use of outcome tool(s) and clinical judgement create a POC that gives a: Clear prediction of patient's progress: LOW COMPLEXITY            TREATMENT:   (In addition to Assessment/Re-Assessment sessions the following treatments were rendered)  Pre-treatment Symptoms/Complaints:  Patient states that she has pain when sitting for prolonged periods and then standing to walk, and also has pain with going up/down stairs. Has to perform them slowly. Pain: Initial:   0/10 at rest, 7/10 at worst Post Session:  No numeric pain value provided       Manual Therapy (9 minutes) for reduced R knee pain and improved R patellar mobility. Grade 2-3 patellofmeoral glides superior and inferior to reduce R knee discomfort ,and grade 2-3 physio mobilizations to R knee for reduced discomfort. HEP: She was instructed in HEP of clamshells and bridging to improve hip strength to reduce R knee pain. Pt demonstrated understanding. Treatment/Session Assessment:    · Response to Treatment:  Patient demonstrates bilateral hip weakness. · Compliance with Program/Exercises: Will assess as treatment progresses. · Recommendations/Intent for next treatment session:  \"Next visit will focus on reducing R knee pain, hip strenghtening\".    Total Treatment Duration:40 Minutes  PT Patient Time In/Time Out  Time In: 0910  Time Out: [address], PT

## 2018-06-15 NOTE — PROGRESS NOTES
Ambulatory/Rehab Services H2 Model Falls Risk Assessment    Risk Factor Pts. ·   Confusion/Disorientation/Impulsivity  []    4 ·   Symptomatic Depression  []   2 ·   Altered Elimination  []   1 ·   Dizziness/Vertigo  []   1 ·   Gender (Male)  []   1 ·   Any administered antiepileptics (anticonvulsants):  []   2 ·   Any administered benzodiazepines:  []   1 ·   Visual Impairment (specify):  []   1 ·   Portable Oxygen Use  []   1 ·   Orthostatic ? BP  []   1 ·   History of Recent Falls (within 3 mos.)  []   5     Ability to Rise from Chair (choose one) Pts. ·   Ability to rise in a single movement  [x]   0 ·   Pushes up, successful in one attempt  []   1 ·   Multiple attempts, but successful  []   3 ·   Unable to rise without assistance  []   4   Total: (5 or greater = High Risk) 0     Falls Prevention Plan:   []                Physical Limitations to Exercise (specify):   []                Mobility Assistance Device (type):   []                Exercise/Equipment Adaptation (specify):    ©2010 [facility] [address] Patent #[id number].  Federal Law prohibits the replication, distribution or use without written permission from [facility] [employer]

## 2018-06-19 ENCOUNTER — HOSPITAL ENCOUNTER (OUTPATIENT)
Dept: PHYSICAL THERAPY | Age: 38
Discharge: HOME | End: 2018-06-19
Payer: COMMERCIAL

## 2018-06-19 PROCEDURE — 97140 MANUAL THERAPY 1/> REGIONS: CPT

## 2018-06-19 PROCEDURE — 97110 THERAPEUTIC EXERCISES: CPT

## 2018-06-19 NOTE — PROGRESS NOTES
Jessica Prescott  : 1980  Payor: Dustin Yang / Plan: [facility] / Product Type: PPO /  [address]  at [facility] [address]  [address] [facility], [address] [place] [address],[address], [address]  [phone]   [phone]       OUTPATIENT PHYSICAL THERAPY:Daily Note 2018     ICD-10: Treatment Diagnosis: Pain in right knee (M25.561)  Precautions/Allergies:   Lisinopril; Lorabid [loracarbef]; and Lortab [hydrocodone-acetaminophen]   Fall Risk Score: 0 (? 5 = High Risk)  MD Orders: evaluate and treat MEDICAL/REFERRING DIAGNOSIS:  R knee pain   DATE OF ONSET: 2.5 weeks ago  REFERRING PHYSICIAN: Kyle Fuchs MD  RETURN PHYSICIAN APPOINTMENT: TBD     INITIAL ASSESSMENT:  Pt presents with R knee pain. Pt demonstrates pain, slightly decreased ROM, and bilateral hip weakness which are limiting basic mobility and normal activity level. She will benefit from PT for guided rehab to promote normalized return of motion, strength, and function in order for safe return to routine activities without pain. PROBLEM LIST (Impacting functional limitations):  1. R knee pain  2. Decreased ROM R knee   3. Decreased functional strength R knee/LE   4. Decreased gait skills INTERVENTIONS PLANNED:    1. Thermal and electric modalities, manual therapies for pain. 2. Manual therapies and therapeutic exercises for ROM and strength. 3. Therapeutic exercises for gait and balance  4. Home exercise program   TREATMENT PLAN:  Effective Dates: 2018 TO 2018. Frequency/Duration: 2 visits per week for  8 weeks  GOALS: (Goals have been discussed and agreed upon with patient.)   Short-Term Functional Goals: Time Frame: 4 weeks  1. Report no more than minimal, intermittent 0-1/10 pain R knee with basic walking and ADL's, and score greater than 50/80 on the LEFS. 2. R quad strength is 5/5 with good terminal extension strength and no pain for stability with walking and progressing into strengthening phase. 3. Walking without limp on level surfaces and up stairs. Discharge Goals: Time Frame: 8 weeks  1. No significant pain R knee with all normalized daily home and work activities, and score greater than 60/80 on the LEFS. 2. Demonstrate R knee ROM equal to the L, and no pain when ambulating on treadmill x 30 minutes. 3. Able to balance with good control in single-leg stand greater than 15 seconds with eyes open, greater than 10 seconds with eyes closed for improved dynamic stability. 4. Independent with HEP for advanced knee strengthening. Rehabilitation Potential For Stated Goals: Good                                                                                The information in this section was collected on 6-14-18 (except where otherwise noted). HISTORY:   History of Present Injury/Illness (Reason for Referral):  Patient reports that she began experiencing a sharp pain in her R knee when ambulating for exercise on an inclined treadmill. Patient has had to cease her walking due to this knee pain and sought medical attention due to the increased pain when ambulating. Past Medical History/Comorbidities: Ms. Jennifer Joseph  has a past medical history of DVT of leg (deep venous thrombosis) ([address] Encounter for insertion of mirena IUD (01/03/2016); GERD (gastroesophageal reflux disease); H/O seasonal allergies; Hypertension; Hypertension; Irregular periods; Menstrual migraine without status migrainosus, not intractable (10/19/2015); Migraines; Morbid obesity ([address] Psychiatric disorder; Pulmonary embolus ([address] and Sleep disorder. She also has no past medical history of Aneurysm ([address] Arrhythmia; Arthritis; Asthma; Autoimmune disease ([address] CAD (coronary artery disease); Cancer ([address] Chronic kidney disease; Chronic obstructive pulmonary disease ([address] Chronic pain; Coagulation disorder ([address] Diabetes ([address] Difficult intubation; Endocarditis; Heart failure ([address]  Liver disease; Malignant hyperthermia due to anesthesia; Nausea & vomiting; Nicotine vapor product user; Non-nicotine vapor product user; Pseudocholinesterase deficiency; PUD (peptic ulcer disease); Rheumatic fever; Seizures ([facility] Sleep apnea; Stroke [facility]); or Thyroid disease. Ms. Julia Morgan  has a past surgical history that includes hx cholecystectomy (2000); pr biopsy of breast, incisional; pr biopsy of breast, needle core; hx tonsillectomy (N/A); hx gyn (11/09/2017); and hx breast biopsy (Bilateral). Social History/Living Environment: Patient lives with family in a two-story apartment which is on the first floor. 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 She was exercising 30 minutes at [employer] prior to symptom onset. Current Medications:       Current Outpatient Prescriptions:     ibuprofen (MOTRIN) 800 mg tablet, Take 1 Tab by mouth every eight (8) hours as needed for Pain., Disp: 30 Tab, Rfl: 0    cetirizine (ZYRTEC) 10 mg tablet, Take 1 Tab by mouth daily as needed for Allergies. , Disp: 30 Tab, Rfl: 0    multivitamin (ONE A DAY) tablet, Take 1 Tab by mouth daily. Indications: VITAMIN DEFICIENCY PREVENTION, Disp: , Rfl:     OTHER, daily. Black Seed oil, Disp: , Rfl:     sertraline (ZOLOFT) 25 mg tablet, Take 25 mg by mouth daily. Take / use AM day of surgery  per anesthesia protocols. Indications: Generalized Anxiety Disorder, Disp: , Rfl:    Date Last Reviewed:  6-14-18   Number of Personal Factors/Comorbidities that affect the Plan of Care: 1-2: MODERATE COMPLEXITY   EXAMINATION:   6-14-18  Observation/Orthostatic Postural Assessment: B genu valgus and bilateral pronation noted when standing and when shifting weight onto each foot. No swelling noted to R knee. Palpation: Tenderness to palpation of inferior pole of R patella and along patellar tendon. Minimal discomfort at medal and lateral joint line on R knee.  Reduced R patellar inferior and superior glide as compared to uninvolved side. ROM:    L knee: 8 degrees of hyperextension to 120 degrees of flexoin    Bilateral hip flexion, abduction and extension range of motion within functional limits              R knee: 2 degrees of hyperextension to 120 degrees of flexion      R ankle dorsiflexion reduced via observation (no formal measures taken). Hx of R ankle fracture. Strength:   L hip flexion 5/5  L hip abduction 4/5  L hip extension 4/5  L knee extension 5/5  L knee flexion 5/5  L ankle DF 5/5  R hip flexion 5/5   R hip abduction 4-/5   R hip extension 4/5  R knee ext 4+/5 w/pain  R knee flexion 5/5  R ankle DF 5/5      Special Tests: 'Negative meniscal tests (Letha's, Apley's Compression/distraction, Ege's)  Neurological Screen: [place] neuro screen is unremarkable. Normal sensation to light touch along both LEs at all dermatomes. Functional Mobility:  Sit to/from Stand: independent. Bed Mobility: independent  Walking on level ground: mildly antalgic gait pattern with reduced stance time on R LE. Bilateral genu valgus and bilateral pronation. Stair walking: Reciprocal gait to ascend stairs with use of bilateral handrails but step-to gait pattern to descend stairs with use of bilateral handrails. Balance:  Single-leg stand: firm surface/eyes open L= 8 seconds, R= 5 seconds           Body Structures Involved:  1. Nerves  2. Bones  3. Joints  4. Muscles Body Functions Affected:  1. Sensory/Pain  2. Neuromusculoskeletal  3. Movement Related Activities and Participation Affected:  1. Mobility  2. Interpersonal Interactions and Relationships  3. Community, Social and [place]   Number of elements (examined above) that affect the Plan of Care: 4+: HIGH COMPLEXITY   CLINICAL PRESENTATION:   Presentation: Stable and uncomplicated: LOW COMPLEXITY   CLINICAL DECISION MAKING:   Outcome Measure:    Tool Used: Lower Extremity Functional Scale (LEFS)  Score:  Initial: 31/80 Most Recent: X/80 (Date: -- )   Interpretation of Score: 20 questions each scored on a 5 point scale with 0 representing \"extreme difficulty or unable to perform\" and 4 representing \"no difficulty\". The lower the score, the greater the functional disability. 80/80 represents no disability. Minimal detectable change is 9 points. Medical Necessity:   · Skilled intervention continues to be required due to increased R knee pain which limits the patient's mobility. Reason for Services/Other Comments:  · Patient continues to require skilled intervention due to increased R knee pain. Use of outcome tool(s) and clinical judgement create a POC that gives a: Clear prediction of patient's progress: LOW COMPLEXITY            TREATMENT:   (In addition to Assessment/Re-Assessment sessions the following treatments were rendered)  Pre-treatment Symptoms/Complaints:  Patient states that she did her home exercises over the weekend. No new complaints  Pain: Initial:   4/10 Post Session:  4/10     Therapeutic Exercises (20 minutes) for reduced R knee pain and improved R LE strength per grid below. Manual cueing during performance of side-lying hip abductions to prevent hip flexion. Verbal cues during performance of step ups for proper patellar tracking in relation to R foot. Stretching of R hamstrings and R quadriceps (10 x 10\") to reduce R knee discomfort. Date:  6-19-18 Date:   Date:     Activity/Exercise Parameters Parameters Parameters   Straight leg raise 3 x 10 R     Side-lying hip ABD 3 x 5 R     Clamshells 1.5# 3 x 10 R     Bridging 2 x 10 bilateral     Slantboard calf stretch 3 x 30 \" bilateral     Step ups 4\" 2 x 8 R               Manual Therapy (10 minutes) for reduced R knee pain and improved R patellar mobility. Grade 2-3 patellofmeoral glides superior and inferior to reduce R knee discomfort ,and grade 2-3 physio mobilizations to R knee for reduced discomfort.  Grade 3 mobilizations to R ankle for improved ankle mobility to reduce R knee pain.    HEP: No changes today. Patient is to continue with existing HEP. Treatment/Session Assessment:    · Response to Treatment:  Patient fatigued with strengthening movements. Had difficulty with hip abductor strengthening. · Compliance with Program/Exercises: Compliant. · Recommendations/Intent for next treatment session: \"Next visit will focus on reducing R knee pain, hip strenghtening\".    Total Treatment Duration:30 Minutes  PT Patient Time In/Time Out  Time In: 1935  Time Out: [address], PT

## 2018-06-20 ENCOUNTER — HOSPITAL ENCOUNTER (OUTPATIENT)
Dept: PHYSICAL THERAPY | Age: 38
Discharge: HOME | End: 2018-06-20
Payer: COMMERCIAL

## 2018-06-20 PROCEDURE — 97035 APP MDLTY 1+ULTRASOUND EA 15: CPT

## 2018-06-20 PROCEDURE — 97140 MANUAL THERAPY 1/> REGIONS: CPT

## 2018-06-20 NOTE — PROGRESS NOTES
Niharika Fees  : 1980  Payor: Imelda Coelho / Plan: [place] [facility] / Product Type: PPO /  [address]  at [facility] [address]  [address] [facility], [address] [place] [address],[address], [facility] [address]  [phone]   [phone]       OUTPATIENT PHYSICAL THERAPY:Daily Note 2018     ICD-10: Treatment Diagnosis: Pain in right knee (M25.561)  Precautions/Allergies:   Lisinopril; Lorabid [loracarbef]; and Lortab [hydrocodone-acetaminophen]   Fall Risk Score: 0 (? 5 = High Risk)  MD Orders: evaluate and treat MEDICAL/REFERRING DIAGNOSIS:  R knee pain   DATE OF ONSET: 2.5 weeks ago  REFERRING PHYSICIAN: Henny Sánchez MD  RETURN PHYSICIAN APPOINTMENT: TBD     INITIAL ASSESSMENT:  Pt presents with R knee pain. Pt demonstrates pain, slightly decreased ROM, and bilateral hip weakness which are limiting basic mobility and normal activity level. She will benefit from PT for guided rehab to promote normalized return of motion, strength, and function in order for safe return to routine activities without pain. PROBLEM LIST (Impacting functional limitations):  1. R knee pain  2. Decreased ROM R knee   3. Decreased functional strength R knee/LE   4. Decreased gait skills INTERVENTIONS PLANNED:    1. Thermal and electric modalities, manual therapies for pain. 2. Manual therapies and therapeutic exercises for ROM and strength. 3. Therapeutic exercises for gait and balance  4. Home exercise program   TREATMENT PLAN:  Effective Dates: 2018 TO 2018. Frequency/Duration: 2 visits per week for  8 weeks  GOALS: (Goals have been discussed and agreed upon with patient.)   Short-Term Functional Goals: Time Frame: 4 weeks  1. Report no more than minimal, intermittent 0-1/10 pain R knee with basic walking and ADL's, and score greater than 50/80 on the LEFS. 2. R quad strength is 5/5 with good terminal extension strength and no pain for stability with walking and progressing into strengthening phase. 3. Walking without limp on level surfaces and up stairs. Discharge Goals: Time Frame: 8 weeks  1. No significant pain R knee with all normalized daily home and work activities, and score greater than 60/80 on the LEFS. 2. Demonstrate R knee ROM equal to the L, and no pain when ambulating on treadmill x 30 minutes. 3. Able to balance with good control in single-leg stand greater than 15 seconds with eyes open, greater than 10 seconds with eyes closed for improved dynamic stability. 4. Independent with HEP for advanced knee strengthening. Rehabilitation Potential For Stated Goals: Good                                                                                The information in this section was collected on 6-14-18 (except where otherwise noted). HISTORY:   History of Present Injury/Illness (Reason for Referral):  Patient reports that she began experiencing a sharp pain in her R knee when ambulating for exercise on an inclined treadmill. Patient has had to cease her walking due to this knee pain and sought medical attention due to the increased pain when ambulating. Past Medical History/Comorbidities: Ms. Ty Shetty  has a past medical history of DVT of leg (deep venous thrombosis) ([address] Encounter for insertion of mirena IUD (01/03/2016); GERD (gastroesophageal reflux disease); H/O seasonal allergies; Hypertension; Hypertension; Irregular periods; Menstrual migraine without status migrainosus, not intractable (10/19/2015); Migraines; Morbid obesity ([address] Psychiatric disorder; Pulmonary embolus ([address] and Sleep disorder. She also has no past medical history of Aneurysm ([address] Arrhythmia; Arthritis; Asthma; Autoimmune disease ([address] CAD (coronary artery disease); Cancer ([address] Chronic kidney disease; Chronic obstructive pulmonary disease ([address] Chronic pain; Coagulation disorder ([address] Diabetes ([address] Difficult intubation; Endocarditis; Heart failure ([address]  Liver disease; Malignant hyperthermia due to anesthesia; Nausea & vomiting; Nicotine vapor product user; Non-nicotine vapor product user; Pseudocholinesterase deficiency; PUD (peptic ulcer disease); Rheumatic fever; Seizures ([facility] [address] [facility] Sleep apnea; Stroke [facility]); or Thyroid disease. Ms. Mana Gonzalez  has a past surgical history that includes hx cholecystectomy (2000); pr biopsy of breast, incisional; pr biopsy of breast, needle core; hx tonsillectomy (N/A); hx gyn (11/09/2017); and hx breast biopsy (Bilateral). Social History/Living Environment: Patient lives with family in a two-story apartment which is on the first floor. 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 She was exercising 30 minutes at [employer] prior to symptom onset. Current Medications:       Current Outpatient Prescriptions:     ibuprofen (MOTRIN) 800 mg tablet, Take 1 Tab by mouth every eight (8) hours as needed for Pain., Disp: 30 Tab, Rfl: 0    cetirizine (ZYRTEC) 10 mg tablet, Take 1 Tab by mouth daily as needed for Allergies. , Disp: 30 Tab, Rfl: 0    multivitamin (ONE A DAY) tablet, Take 1 Tab by mouth daily. Indications: VITAMIN DEFICIENCY PREVENTION, Disp: , Rfl:     OTHER, daily. Black Seed oil, Disp: , Rfl:     sertraline (ZOLOFT) 25 mg tablet, Take 25 mg by mouth daily. Take / use AM day of surgery  per anesthesia protocols. Indications: Generalized Anxiety Disorder, Disp: , Rfl:    Date Last Reviewed:  6-14-18   Number of Personal Factors/Comorbidities that affect the Plan of Care: 1-2: MODERATE COMPLEXITY   EXAMINATION:   6-14-18  Observation/Orthostatic Postural Assessment: B genu valgus and bilateral pronation noted when standing and when shifting weight onto each foot. No swelling noted to R knee. Palpation: Tenderness to palpation of inferior pole of R patella and along patellar tendon. Minimal discomfort at medal and lateral joint line on R knee.  Reduced R patellar inferior and superior glide as compared to uninvolved side. ROM:    L knee: 8 degrees of hyperextension to 120 degrees of flexoin    Bilateral hip flexion, abduction and extension range of motion within functional limits              R knee: 2 degrees of hyperextension to 120 degrees of flexion      R ankle dorsiflexion reduced via observation (no formal measures taken). Hx of R ankle fracture. Strength:   L hip flexion 5/5  L hip abduction 4/5  L hip extension 4/5  L knee extension 5/5  L knee flexion 5/5  L ankle DF 5/5  R hip flexion 5/5   R hip abduction 4-/5   R hip extension 4/5  R knee ext 4+/5 w/pain  R knee flexion 5/5  R ankle DF 5/5      Special Tests: 'Negative meniscal tests (Letha's, Apley's Compression/distraction, Ege's)  Neurological Screen: [place] neuro screen is unremarkable. Normal sensation to light touch along both LEs at all dermatomes. Functional Mobility:  Sit to/from Stand: independent. Bed Mobility: independent  Walking on level ground: mildly antalgic gait pattern with reduced stance time on R LE. Bilateral genu valgus and bilateral pronation. Stair walking: Reciprocal gait to ascend stairs with use of bilateral handrails but step-to gait pattern to descend stairs with use of bilateral handrails. Balance:  Single-leg stand: firm surface/eyes open L= 8 seconds, R= 5 seconds           Body Structures Involved:  1. Nerves  2. Bones  3. Joints  4. Muscles Body Functions Affected:  1. Sensory/Pain  2. Neuromusculoskeletal  3. Movement Related Activities and Participation Affected:  1. Mobility  2. Interpersonal Interactions and Relationships  3. Community, Social and [place]   Number of elements (examined above) that affect the Plan of Care: 4+: HIGH COMPLEXITY   CLINICAL PRESENTATION:   Presentation: Stable and uncomplicated: LOW COMPLEXITY   CLINICAL DECISION MAKING:   Outcome Measure:    Tool Used: Lower Extremity Functional Scale (LEFS)  Score:  Initial: 31/80 Most Recent: X/80 (Date: -- )   Interpretation of Score: 20 questions each scored on a 5 point scale with 0 representing \"extreme difficulty or unable to perform\" and 4 representing \"no difficulty\". The lower the score, the greater the functional disability. 80/80 represents no disability. Minimal detectable change is 9 points. Medical Necessity:   · Skilled intervention continues to be required due to increased R knee pain which limits the patient's mobility. Reason for Services/Other Comments:  · Patient continues to require skilled intervention due to increased R knee pain. Use of outcome tool(s) and clinical judgement create a POC that gives a: Clear prediction of patient's progress: LOW COMPLEXITY            TREATMENT:   (In addition to Assessment/Re-Assessment sessions the following treatments were rendered)  Pre-treatment Symptoms/Complaints:  Patient states that she did her home exercises over the weekend. No new complaints  Pain: Initial:   4/10 Post Session:  4/10     Modalities (8 minutes) to reduce R knee pain. Ultrasound x 8 minutes, continuous, 1.0 MHz, 1 W/cm2 to anterior R knee to reduce discomfort. Manual therapy (30 minutes) to reduce R LE discomfort. Grade 3-4 patellofemoral mobilizations to improve R knee symptoms. Grade 3 physio mobilizations to reduce stiffness in R LE for flexion and extension. Grade 3-4 R ankle joint mobilizations to improve R ankle mobility. Soft tissue mobilizations to R gastrocnemius and soleus to reduce discomfort and tightness. Therapeutic Exercises (2 minutes) for reduced R knee pain and improved R LE strength per grid below. Stretching of R quadriceps (10 x 10\") to reduce R knee discomfort.      Date:  6-19-18 Date:   Date:     Activity/Exercise Parameters Parameters Parameters   Straight leg raise 3 x 10 R     Side-lying hip ABD 3 x 5 R     Clamshells 1.5# 3 x 10 R     Bridging 2 x 10 bilateral     Slantboard calf stretch 3 x 30 \" bilateral     Step ups 4\" 2 x 8 R                 HEP: No changes today. Patient is to continue with existing HEP. Treatment/Session Assessment:    · Response to Treatment:  Patient exhibits significant muscle tightness in R gastrosoleus. PT opted not to perform strengthening moves with less than 24 hour rest between PT appointments in order to address soft tissue restrictions and discomfort. · Compliance with Program/Exercises: Compliant. · Recommendations/Intent for next treatment session: \"Next visit will focus on reducing R knee pain, hip strenghtening. Continue strenghtening at next treatment. \"  Total Treatment Duration:40 Minutes  PT Patient Time In/Time Out  Time In: 0228  Time Out: [address], PT

## 2018-06-25 ENCOUNTER — HOSPITAL ENCOUNTER (OUTPATIENT)
Dept: PHYSICAL THERAPY | Age: 38
Discharge: HOME | End: 2018-06-25
Payer: COMMERCIAL

## 2018-06-25 PROCEDURE — 97110 THERAPEUTIC EXERCISES: CPT

## 2018-06-25 PROCEDURE — 97140 MANUAL THERAPY 1/> REGIONS: CPT

## 2018-06-25 NOTE — PROGRESS NOTES
Miguel Johns  : 1980  Payor: Lanier Rubinstein / Plan: [facility] / Product Type: PPO /  [address]  at [facility] [address]  1101 [facility], [address] [place] [address],[address], [address]  [phone]   [phone]       OUTPATIENT PHYSICAL THERAPY:Daily Note 2018     ICD-10: Treatment Diagnosis: Pain in right knee (M25.561)  Precautions/Allergies:   Lisinopril; Lorabid [loracarbef]; and Lortab [hydrocodone-acetaminophen]   Fall Risk Score: 0 (? 5 = High Risk)  MD Orders: evaluate and treat MEDICAL/REFERRING DIAGNOSIS:  R knee pain   DATE OF ONSET: 2.5 weeks ago  REFERRING PHYSICIAN: Lyndall Gilford, MD  RETURN PHYSICIAN APPOINTMENT: TBD     INITIAL ASSESSMENT:  Pt presents with R knee pain. Pt demonstrates pain, slightly decreased ROM, and bilateral hip weakness which are limiting basic mobility and normal activity level. She will benefit from PT for guided rehab to promote normalized return of motion, strength, and function in order for safe return to routine activities without pain. PROBLEM LIST (Impacting functional limitations):  1. R knee pain  2. Decreased ROM R knee   3. Decreased functional strength R knee/LE   4. Decreased gait skills INTERVENTIONS PLANNED:    1. Thermal and electric modalities, manual therapies for pain. 2. Manual therapies and therapeutic exercises for ROM and strength. 3. Therapeutic exercises for gait and balance  4. Home exercise program   TREATMENT PLAN:  Effective Dates: 2018 TO 2018. Frequency/Duration: 2 visits per week for  8 weeks  GOALS: (Goals have been discussed and agreed upon with patient.)   Short-Term Functional Goals: Time Frame: 4 weeks  1. Report no more than minimal, intermittent 0-1/10 pain R knee with basic walking and ADL's, and score greater than 50/80 on the LEFS. 2. R quad strength is 5/5 with good terminal extension strength and no pain for stability with walking and progressing into strengthening phase. 3. Walking without limp on level surfaces and up stairs. Discharge Goals: Time Frame: 8 weeks  1. No significant pain R knee with all normalized daily home and work activities, and score greater than 60/80 on the LEFS. 2. Demonstrate R knee ROM equal to the L, and no pain when ambulating on treadmill x 30 minutes. 3. Able to balance with good control in single-leg stand greater than 15 seconds with eyes open, greater than 10 seconds with eyes closed for improved dynamic stability. 4. Independent with HEP for advanced knee strengthening. Rehabilitation Potential For Stated Goals: Good                                                                                The information in this section was collected on 6-14-18 (except where otherwise noted). HISTORY:   History of Present Injury/Illness (Reason for Referral):  Patient reports that she began experiencing a sharp pain in her R knee when ambulating for exercise on an inclined treadmill. Patient has had to cease her walking due to this knee pain and sought medical attention due to the increased pain when ambulating. Past Medical History/Comorbidities: Ms. Wilfredo Mason  has a past medical history of DVT of leg (deep venous thrombosis) ([address] Encounter for insertion of mirena IUD (01/03/2016); GERD (gastroesophageal reflux disease); H/O seasonal allergies; Hypertension; Hypertension; Irregular periods; Menstrual migraine without status migrainosus, not intractable (10/19/2015); Migraines; Morbid obesity ([address] Psychiatric disorder; Pulmonary embolus ([address] and Sleep disorder. She also has no past medical history of Aneurysm ([address] Arrhythmia; Arthritis; Asthma; Autoimmune disease ([address] CAD (coronary artery disease); Cancer ([address] Chronic kidney disease; Chronic obstructive pulmonary disease ([address] Chronic pain; Coagulation disorder ([address] Diabetes ([address] Difficult intubation; Endocarditis; Heart failure ([address]  Liver disease; Malignant hyperthermia due to anesthesia; Nausea & vomiting; Nicotine vapor product user; Non-nicotine vapor product user; Pseudocholinesterase deficiency; PUD (peptic ulcer disease); Rheumatic fever; Seizures ([facility] [address] Sleep apnea; Stroke [facility]); or Thyroid disease. Ms. Harpreet Shah  has a past surgical history that includes hx cholecystectomy (2000); pr biopsy of breast, incisional; pr biopsy of breast, needle core; hx tonsillectomy (N/A); hx gyn (11/09/2017); and hx breast biopsy (Bilateral). Social History/Living Environment: Patient lives with family in a two-story apartment which is on the first floor. 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 She was exercising 30 minutes at [employer] prior to symptom onset. Current Medications:       Current Outpatient Prescriptions:     ibuprofen (MOTRIN) 800 mg tablet, Take 1 Tab by mouth every eight (8) hours as needed for Pain., Disp: 30 Tab, Rfl: 0    cetirizine (ZYRTEC) 10 mg tablet, Take 1 Tab by mouth daily as needed for Allergies. , Disp: 30 Tab, Rfl: 0    multivitamin (ONE A DAY) tablet, Take 1 Tab by mouth daily. Indications: VITAMIN DEFICIENCY PREVENTION, Disp: , Rfl:     OTHER, daily. Black Seed oil, Disp: , Rfl:     sertraline (ZOLOFT) 25 mg tablet, Take 25 mg by mouth daily. Take / use AM day of surgery  per anesthesia protocols. Indications: Generalized Anxiety Disorder, Disp: , Rfl:    Date Last Reviewed:  6-14-18   Number of Personal Factors/Comorbidities that affect the Plan of Care: 1-2: MODERATE COMPLEXITY   EXAMINATION:   6-14-18  Observation/Orthostatic Postural Assessment: B genu valgus and bilateral pronation noted when standing and when shifting weight onto each foot. No swelling noted to R knee. Palpation: Tenderness to palpation of inferior pole of R patella and along patellar tendon. Minimal discomfort at medal and lateral joint line on R knee.  Reduced R patellar inferior and superior glide as compared to uninvolved side. ROM:    L knee: 8 degrees of hyperextension to 120 degrees of flexoin    Bilateral hip flexion, abduction and extension range of motion within functional limits              R knee: 2 degrees of hyperextension to 120 degrees of flexion      R ankle dorsiflexion reduced via observation (no formal measures taken). Hx of R ankle fracture. Strength:   L hip flexion 5/5  L hip abduction 4/5  L hip extension 4/5  L knee extension 5/5  L knee flexion 5/5  L ankle DF 5/5  R hip flexion 5/5   R hip abduction 4-/5   R hip extension 4/5  R knee ext 4+/5 w/pain  R knee flexion 5/5  R ankle DF 5/5      Special Tests: 'Negative meniscal tests (Letha's, Apley's Compression/distraction, Ege's)  Neurological Screen: [place] neuro screen is unremarkable. Normal sensation to light touch along both LEs at all dermatomes. Functional Mobility:  Sit to/from Stand: independent. Bed Mobility: independent  Walking on level ground: mildly antalgic gait pattern with reduced stance time on R LE. Bilateral genu valgus and bilateral pronation. Stair walking: Reciprocal gait to ascend stairs with use of bilateral handrails but step-to gait pattern to descend stairs with use of bilateral handrails. Balance:  Single-leg stand: firm surface/eyes open L= 8 seconds, R= 5 seconds           Body Structures Involved:  1. Nerves  2. Bones  3. Joints  4. Muscles Body Functions Affected:  1. Sensory/Pain  2. Neuromusculoskeletal  3. Movement Related Activities and Participation Affected:  1. Mobility  2. Interpersonal Interactions and Relationships  3. Community, Social and [place]   Number of elements (examined above) that affect the Plan of Care: 4+: HIGH COMPLEXITY   CLINICAL PRESENTATION:   Presentation: Stable and uncomplicated: LOW COMPLEXITY   CLINICAL DECISION MAKING:   Outcome Measure:    Tool Used: Lower Extremity Functional Scale (LEFS)  Score:  Initial: 31/80 Most Recent: X/80 (Date: -- )   Interpretation of Score: 20 questions each scored on a 5 point scale with 0 representing \"extreme difficulty or unable to perform\" and 4 representing \"no difficulty\". The lower the score, the greater the functional disability. 80/80 represents no disability. Minimal detectable change is 9 points. Medical Necessity:   · Skilled intervention continues to be required due to increased R knee pain which limits the patient's mobility. Reason for Services/Other Comments:  · Patient continues to require skilled intervention due to increased R knee pain. Use of outcome tool(s) and clinical judgement create a POC that gives a: Clear prediction of patient's progress: LOW COMPLEXITY            TREATMENT:   (In addition to Assessment/Re-Assessment sessions the following treatments were rendered)  Pre-treatment Symptoms/Complaints:  Patient states that her R knee is not hurting too bad today. Hopes to return to the gym soon to use the treadmill. Pain: Initial:   3/10 Post Session:  4/10     Modalities (0 minutes) not performed today  . Manual therapy (12 minutes) to reduce R LE discomfort. Grade 3-4 patellofemoral mobilizations to improve R knee symptoms. Grade 3 physio mobilizations to reduce stiffness in R LE for flexion and extension. Grade 3-4 R ankle joint mobilizations to improve R ankle mobility. Soft tissue mobilizations to R gastrocnemius and soleus to reduce discomfort and tightness. Therapeutic Exercises (18 minutes) for reduced R knee pain and improved R LE strength per grid below. Stretching of R quadriceps (10 x 10\") to reduce R knee discomfort in prone.      Date:  6-19-18 Date:  6-25-18 Date:     Activity/Exercise Parameters Parameters Parameters   Straight leg raise 3 x 10 R 3 x 10 R    Side-lying hip ABD 3 x 5 R 3 x 6 R    Clamshells 1.5# 3 x 10 R Blue, 3 x 10 R    Bridging 2 x 10 bilateral 3 x 10 bilateral    Slantboard calf stretch 3 x 30 \" bilateral 3 x 30\" bilateral    Step ups 4\" 2 x 8 R HEP: No changes today. Patient is to continue with existing HEP. Treatment/Session Assessment:    · Response to Treatment:  Patient tolerated therapeutic exercises well, and was able to tolerate prone stretching without pain in R knee. Appears to be experiencing lessening of symptoms. · Compliance with Program/Exercises: Compliant. · Recommendations/Intent for next treatment session: \"Next visit will focus on reducing R knee pain, hip strenghtening. Continue strenghtening at next treatment. \"  Total Treatment Duration:30 Minutes  PT Patient Time In/Time Out  Time In: 4433  Time Out: [place], PT

## 2018-06-27 ENCOUNTER — HOSPITAL ENCOUNTER (OUTPATIENT)
Dept: PHYSICAL THERAPY | Age: 38
Discharge: HOME | End: 2018-06-27
Payer: COMMERCIAL

## 2018-06-27 PROCEDURE — 97140 MANUAL THERAPY 1/> REGIONS: CPT

## 2018-06-27 PROCEDURE — 97110 THERAPEUTIC EXERCISES: CPT

## 2018-06-27 NOTE — PROGRESS NOTES
Any Justin  : 1980  Payor: Gabino Cantor / Plan: [place] [facility] / Product Type: PPO /  [address]  at [address] [place] [address] Rd  1101 [facility], [address] [place] [address],[address], 5[address] [facility]  [phone]   [phone]       OUTPATIENT PHYSICAL THERAPY:Daily Note 2018     ICD-10: Treatment Diagnosis: Pain in right knee (M25.561)  Precautions/Allergies:   Lisinopril; Lorabid [loracarbef]; and Lortab [hydrocodone-acetaminophen]   Fall Risk Score: 0 (? 5 = High Risk)  MD Orders: evaluate and treat MEDICAL/REFERRING DIAGNOSIS:  R knee pain   DATE OF ONSET: 2.5 weeks ago  REFERRING PHYSICIAN: Allegra Childers MD  RETURN PHYSICIAN APPOINTMENT: TBD     INITIAL ASSESSMENT:  Pt presents with R knee pain. Pt demonstrates pain, slightly decreased ROM, and bilateral hip weakness which are limiting basic mobility and normal activity level. She will benefit from PT for guided rehab to promote normalized return of motion, strength, and function in order for safe return to routine activities without pain. PROBLEM LIST (Impacting functional limitations):  1. R knee pain  2. Decreased ROM R knee   3. Decreased functional strength R knee/LE   4. Decreased gait skills INTERVENTIONS PLANNED:    1. Thermal and electric modalities, manual therapies for pain. 2. Manual therapies and therapeutic exercises for ROM and strength. 3. Therapeutic exercises for gait and balance  4. Home exercise program   TREATMENT PLAN:  Effective Dates: 2018 TO 2018. Frequency/Duration: 2 visits per week for  8 weeks  GOALS: (Goals have been discussed and agreed upon with patient.)   Short-Term Functional Goals: Time Frame: 4 weeks  1. Report no more than minimal, intermittent 0-1/10 pain R knee with basic walking and ADL's, and score greater than 50/80 on the LEFS. 2. R quad strength is 5/5 with good terminal extension strength and no pain for stability with walking and progressing into strengthening phase. 3. Walking without limp on level surfaces and up stairs. Discharge Goals: Time Frame: 8 weeks  1. No significant pain R knee with all normalized daily home and work activities, and score greater than 60/80 on the LEFS. 2. Demonstrate R knee ROM equal to the L, and no pain when ambulating on treadmill x 30 minutes. 3. Able to balance with good control in single-leg stand greater than 15 seconds with eyes open, greater than 10 seconds with eyes closed for improved dynamic stability. 4. Independent with HEP for advanced knee strengthening. Rehabilitation Potential For Stated Goals: Good                                                                                The information in this section was collected on 6-14-18 (except where otherwise noted). HISTORY:   History of Present Injury/Illness (Reason for Referral):  Patient reports that she began experiencing a sharp pain in her R knee when ambulating for exercise on an inclined treadmill. Patient has had to cease her walking due to this knee pain and sought medical attention due to the increased pain when ambulating. Past Medical History/Comorbidities: Ms. Oziel Seymour  has a past medical history of DVT of leg (deep venous thrombosis) ([address] Encounter for insertion of mirena IUD (01/03/2016); GERD (gastroesophageal reflux disease); H/O seasonal allergies; Hypertension; Hypertension; Irregular periods; Menstrual migraine without status migrainosus, not intractable (10/19/2015); Migraines; Morbid obesity ([address] Psychiatric disorder; Pulmonary embolus ([address] and Sleep disorder. She also has no past medical history of Aneurysm ([address] Arrhythmia; Arthritis; Asthma; Autoimmune disease ([address] CAD (coronary artery disease); Cancer ([address] Chronic kidney disease; Chronic obstructive pulmonary disease ([address] Chronic pain; Coagulation disorder ([address] Diabetes ([address] Difficult intubation; Endocarditis; Heart failure ([address]  Liver disease; Malignant hyperthermia due to anesthesia; Nausea & vomiting; Nicotine vapor product user; Non-nicotine vapor product user; Pseudocholinesterase deficiency; PUD (peptic ulcer disease); Rheumatic fever; Seizures ([facility] Sleep apnea; Stroke [facility]); or Thyroid disease. Ms. Mary Valenzuela  has a past surgical history that includes hx cholecystectomy (2000); pr biopsy of breast, incisional; pr biopsy of breast, needle core; hx tonsillectomy (N/A); hx gyn (11/09/2017); and hx breast biopsy (Bilateral). Social History/Living Environment: Patient lives with family in a two-story apartment which is on the first floor. 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 She was exercising 30 minutes at [employer] prior to symptom onset. Current Medications:       Current Outpatient Prescriptions:     ibuprofen (MOTRIN) 800 mg tablet, Take 1 Tab by mouth every eight (8) hours as needed for Pain., Disp: 30 Tab, Rfl: 0    cetirizine (ZYRTEC) 10 mg tablet, Take 1 Tab by mouth daily as needed for Allergies. , Disp: 30 Tab, Rfl: 0    multivitamin (ONE A DAY) tablet, Take 1 Tab by mouth daily. Indications: VITAMIN DEFICIENCY PREVENTION, Disp: , Rfl:     OTHER, daily. Black Seed oil, Disp: , Rfl:     sertraline (ZOLOFT) 25 mg tablet, Take 25 mg by mouth daily. Take / use AM day of surgery  per anesthesia protocols. Indications: Generalized Anxiety Disorder, Disp: , Rfl:    Date Last Reviewed:  6-14-18   Number of Personal Factors/Comorbidities that affect the Plan of Care: 1-2: MODERATE COMPLEXITY   EXAMINATION:   6-14-18  Observation/Orthostatic Postural Assessment: B genu valgus and bilateral pronation noted when standing and when shifting weight onto each foot. No swelling noted to R knee. Palpation: Tenderness to palpation of inferior pole of R patella and along patellar tendon. Minimal discomfort at medal and lateral joint line on R knee.  Reduced R patellar inferior and superior glide as compared to uninvolved side. ROM:    L knee: 8 degrees of hyperextension to 120 degrees of flexoin    Bilateral hip flexion, abduction and extension range of motion within functional limits              R knee: 2 degrees of hyperextension to 120 degrees of flexion      R ankle dorsiflexion reduced via observation (no formal measures taken). Hx of R ankle fracture. Strength:   L hip flexion 5/5  L hip abduction 4/5  L hip extension 4/5  L knee extension 5/5  L knee flexion 5/5  L ankle DF 5/5  R hip flexion 5/5   R hip abduction 4-/5   R hip extension 4/5  R knee ext 4+/5 w/pain  R knee flexion 5/5  R ankle DF 5/5      Special Tests: 'Negative meniscal tests (Letha's, Apley's Compression/distraction, Ege's)  Neurological Screen: [place] neuro screen is unremarkable. Normal sensation to light touch along both LEs at all dermatomes. Functional Mobility:  Sit to/from Stand: independent. Bed Mobility: independent  Walking on level ground: mildly antalgic gait pattern with reduced stance time on R LE. Bilateral genu valgus and bilateral pronation. Stair walking: Reciprocal gait to ascend stairs with use of bilateral handrails but step-to gait pattern to descend stairs with use of bilateral handrails. Balance:  Single-leg stand: firm surface/eyes open L= 8 seconds, R= 5 seconds           Body Structures Involved:  1. Nerves  2. Bones  3. Joints  4. Muscles Body Functions Affected:  1. Sensory/Pain  2. Neuromusculoskeletal  3. Movement Related Activities and Participation Affected:  1. Mobility  2. Interpersonal Interactions and Relationships  3. Community, Social and [place]   Number of elements (examined above) that affect the Plan of Care: 4+: HIGH COMPLEXITY   CLINICAL PRESENTATION:   Presentation: Stable and uncomplicated: LOW COMPLEXITY   CLINICAL DECISION MAKING:   Outcome Measure:    Tool Used: Lower Extremity Functional Scale (LEFS)  Score:  Initial: 31/80 Most Recent: X/80 (Date: -- )   Interpretation of Score: 20 questions each scored on a 5 point scale with 0 representing \"extreme difficulty or unable to perform\" and 4 representing \"no difficulty\". The lower the score, the greater the functional disability. 80/80 represents no disability. Minimal detectable change is 9 points. Medical Necessity:   · Skilled intervention continues to be required due to increased R knee pain which limits the patient's mobility. Reason for Services/Other Comments:  · Patient continues to require skilled intervention due to increased R knee pain. Use of outcome tool(s) and clinical judgement create a POC that gives a: Clear prediction of patient's progress: LOW COMPLEXITY            TREATMENT:   (In addition to Assessment/Re-Assessment sessions the following treatments were rendered)  Pre-treatment Symptoms/Complaints:  Patient walked downtown in flip flops without significant increase in knee pain. Pt reported feeling encouraged by this. Pain: Initial:   3-4/10 Post Session:  4/10     Modalities (0 minutes) not performed today  . Manual therapy (9 minutes) to reduce R LE discomfort. Grade 3-4 patellofemoral mobilizations to improve R knee symptoms. Grade 3 physio mobilizations to reduce stiffness in R LE for flexion and extension. Grade 3-4 R ankle joint mobilizations to improve R ankle mobility. Soft tissue mobilizations to R gastrocnemius and soleus to reduce discomfort and tightness. Therapeutic Exercises (31 minutes) for reduced R knee pain and improved R LE strength per grid below. Stretching of R quadriceps (10 x 10\") to reduce R knee discomfort in prone.      Date:  6-19-18 Date:  6-25-18 Date:  6-27-18   Activity/Exercise Parameters Parameters Parameters   Straight leg raise 3 x 10 R 3 x 10 R 3 x 10 R   Side-lying hip ABD 3 x 5 R 3 x 6 R 2 x 8 R   Clamshells 1.5# 3 x 10 R Blue, 3 x 10 R Blue, 2 x 10 R   Bridging 2 x 10 bilateral 3 x 10 bilateral 3 x 10 bilateral   Slantboard calf stretch 3 x 30 \" bilateral 3 x 30\" bilateral 3 x 30\" bilateral   Step ups 4\" 2 x 8 R  4\" 3 x 10 R   Sit to stands   2 x 10   Shuttle press   75# x 10 bilateral  87.5# 2 x 10 bilateral                           HEP: No changes today. Patient is to continue with existing HEP. Treatment/Session Assessment:    · Response to Treatment:  Patient tolerated all therapeutic exercises well with minimal discomfort. Patient reported some discomfort with sit to stands in R knee, and did experience audible crepitus. Patient making progress. · Compliance with Program/Exercises: Compliant. · Recommendations/Intent for next treatment session: \"Next visit will focus on reducing R knee pain, hip strenghtening. Continue strenghtening at next treatment. \"  Total Treatment Duration:40 Minutes  PT Patient Time In/Time Out  Time In: 0500  Time Out: [address] [facility] [address], PT

## 2018-07-05 ENCOUNTER — HOSPITAL ENCOUNTER (OUTPATIENT)
Dept: PHYSICAL THERAPY | Age: 38
Discharge: HOME | End: 2018-07-05
Payer: COMMERCIAL

## 2018-07-05 PROCEDURE — 97110 THERAPEUTIC EXERCISES: CPT

## 2018-07-05 PROCEDURE — 97140 MANUAL THERAPY 1/> REGIONS: CPT

## 2018-07-05 NOTE — PROGRESS NOTES
Nellie Gallegos  : 1980  Payor: Delena Boeck / [place]: [place] [facility] / Product Type: PPO /  [address]  at [facility] [address]  1101 [facility], [address] [place] [address],[address], [facility] [address]  [phone]   [phone]       OUTPATIENT PHYSICAL THERAPY:Daily Note 2018     ICD-10: Treatment Diagnosis: Pain in right knee (M25.561)  Precautions/Allergies:   Lisinopril; Lorabid [loracarbef]; and Lortab [hydrocodone-acetaminophen]   Fall Risk Score: 0 (? 5 = High Risk)  MD Orders: evaluate and treat MEDICAL/REFERRING DIAGNOSIS:  R knee pain   DATE OF ONSET: 2.5 weeks ago  REFERRING PHYSICIAN: Jenny Alexandre MD  RETURN PHYSICIAN APPOINTMENT: TBD     INITIAL ASSESSMENT:  Pt presents with R knee pain. Pt demonstrates pain, slightly decreased ROM, and bilateral hip weakness which are limiting basic mobility and normal activity level. She will benefit from PT for guided rehab to promote normalized return of motion, strength, and function in order for safe return to routine activities without pain. PROBLEM LIST (Impacting functional limitations):  1. R knee pain  2. Decreased ROM R knee   3. Decreased functional strength R knee/LE   4. Decreased gait skills INTERVENTIONS PLANNED:    1. Thermal and electric modalities, manual therapies for pain. 2. Manual therapies and therapeutic exercises for ROM and strength. 3. Therapeutic exercises for gait and balance  4. Home exercise program   TREATMENT PLAN:  Effective Dates: 2018 TO 2018. Frequency/Duration: 2 visits per week for  8 weeks  GOALS: (Goals have been discussed and agreed upon with patient.)   Short-Term Functional Goals: Time Frame: 4 weeks  1. Report no more than minimal, intermittent 0-1/10 pain R knee with basic walking and ADL's, and score greater than 50/80 on the LEFS. 2. R quad strength is 5/5 with good terminal extension strength and no pain for stability with walking and progressing into strengthening phase. 3. Walking without limp on level surfaces and up stairs. Discharge Goals: Time Frame: 8 weeks  1. No significant pain R knee with all normalized daily home and work activities, and score greater than 60/80 on the LEFS. 2. Demonstrate R knee ROM equal to the L, and no pain when ambulating on treadmill x 30 minutes. 3. Able to balance with good control in single-leg stand greater than 15 seconds with eyes open, greater than 10 seconds with eyes closed for improved dynamic stability. 4. Independent with [facility] for advanced knee strengthening. Rehabilitation Potential For Stated Goals: Good                                                                                The information in this section was collected on 6-14-18 (except where otherwise noted). HISTORY:   History of Present Injury/Illness (Reason for Referral):  Patient reports that she began experiencing a sharp pain in her R knee when ambulating for exercise on an inclined treadmill. Patient has had to cease her walking due to this knee pain and sought medical attention due to the increased pain when ambulating. Past Medical History/Comorbidities: Ms. Jeanie Oquendo  has a past medical history of DVT of leg (deep venous thrombosis) ([address] Encounter for insertion of mirena IUD (01/03/2016); GERD (gastroesophageal reflux disease); H/O seasonal allergies; Hypertension; Hypertension; Irregular periods; Menstrual migraine without status migrainosus, not intractable (10/19/2015); Migraines; Morbid obesity ([address] Psychiatric disorder; Pulmonary embolus ([address] and Sleep disorder. She also has no past medical history of Aneurysm ([address] Arrhythmia; Arthritis; Asthma; Autoimmune disease ([address] CAD (coronary artery disease); Cancer ([address] Chronic kidney disease; Chronic obstructive pulmonary disease ([address] Chronic pain; Coagulation disorder ([address] Diabetes ([address] Difficult intubation; Endocarditis; Heart failure ([address]  Liver disease; Malignant hyperthermia due to anesthesia; Nausea & vomiting; Nicotine vapor product user; Non-nicotine vapor product user; Pseudocholinesterase deficiency; PUD (peptic ulcer disease); Rheumatic fever; Seizures ([facility] [address] Sleep apnea; Stroke [facility]); or Thyroid disease. Ms. Liz Vasquez  has a past surgical history that includes hx cholecystectomy (2000); pr biopsy of breast, incisional; pr biopsy of breast, needle core; hx tonsillectomy (N/A); hx gyn (11/09/2017); and hx breast biopsy (Bilateral). Social History/Living Environment: Patient lives with family in a two-story apartment which is on the first floor. 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 She was exercising 30 minutes at [employer] prior to symptom onset. Current Medications:       Current Outpatient Prescriptions:     ibuprofen (MOTRIN) 800 mg tablet, Take 1 Tab by mouth every eight (8) hours as needed for Pain., Disp: 30 Tab, Rfl: 0    cetirizine (ZYRTEC) 10 mg tablet, Take 1 Tab by mouth daily as needed for Allergies. , Disp: 30 Tab, Rfl: 0    multivitamin (ONE A DAY) tablet, Take 1 Tab by mouth daily. Indications: VITAMIN DEFICIENCY PREVENTION, Disp: , Rfl:     OTHER, daily. Black Seed oil, Disp: , Rfl:     sertraline (ZOLOFT) 25 mg tablet, Take 25 mg by mouth daily. Take / use AM day of surgery  per anesthesia protocols. Indications: Generalized Anxiety Disorder, Disp: , Rfl:    Date Last Reviewed:  6-14-18   Number of Personal Factors/Comorbidities that affect the Plan of Care: 1-2: MODERATE COMPLEXITY   EXAMINATION:   6-14-18  Observation/Orthostatic Postural Assessment: B genu valgus and bilateral pronation noted when standing and when shifting weight onto each foot. No swelling noted to R knee. Palpation: Tenderness to palpation of inferior pole of R patella and along patellar tendon. Minimal discomfort at medal and lateral joint line on R knee.  Reduced R patellar inferior and superior glide as compared to uninvolved side. ROM:    L knee: 8 degrees of hyperextension to 120 degrees of flexoin    Bilateral hip flexion, abduction and extension range of motion within functional limits              R knee: 2 degrees of hyperextension to 120 degrees of flexion      R ankle dorsiflexion reduced via observation (no formal measures taken). Hx of R ankle fracture. Strength:   L hip flexion 5/5  L hip abduction 4/5  L hip extension 4/5  L knee extension 5/5  L knee flexion 5/5  L ankle DF 5/5  R hip flexion 5/5   R hip abduction 4-/5   R hip extension 4/5  R knee ext 4+/5 w/pain  R knee flexion 5/5  R ankle DF 5/5      Special Tests: 'Negative meniscal tests (Letha's, Apley's Compression/distraction, Ege's)  Neurological Screen: [place] neuro screen is unremarkable. Normal sensation to light touch along both LEs at all dermatomes. Functional Mobility:  Sit to/from Stand: independent. Bed Mobility: independent  Walking on level ground: mildly antalgic gait pattern with reduced stance time on R LE. Bilateral genu valgus and bilateral pronation. Stair walking: Reciprocal gait to ascend stairs with use of bilateral handrails but step-to gait pattern to descend stairs with use of bilateral handrails. Balance:  Single-leg stand: firm surface/eyes open L= 8 seconds, R= 5 seconds           Body Structures Involved:  1. Nerves  2. Bones  3. Joints  4. Muscles Body Functions Affected:  1. Sensory/Pain  2. Neuromusculoskeletal  3. Movement Related Activities and Participation Affected:  1. Mobility  2. Interpersonal Interactions and Relationships  3. Community, Social and [employer]   Number of elements (examined above) that affect the Plan of Care: 4+: HIGH COMPLEXITY   CLINICAL PRESENTATION:   Presentation: Stable and uncomplicated: LOW COMPLEXITY   CLINICAL DECISION MAKING:   Outcome Measure:    Tool Used: Lower Extremity Functional Scale (LEFS)  Score:  Initial: 31/80 Most Recent: X/80 (Date: -- )   Interpretation of Score: 20 questions each scored on a 5 point scale with 0 representing \"extreme difficulty or unable to perform\" and 4 representing \"no difficulty\". The lower the score, the greater the functional disability. 80/80 represents no disability. Minimal detectable change is 9 points. Medical Necessity:   · Skilled intervention continues to be required due to increased R knee pain which limits the patient's mobility. Reason for Services/Other Comments:  · Patient continues to require skilled intervention due to increased R knee pain. Use of outcome tool(s) and clinical judgement create a POC that gives a: Clear prediction of patient's progress: LOW COMPLEXITY            TREATMENT:   (In addition to Assessment/Re-Assessment sessions the following treatments were rendered)  Pre-treatment Symptoms/Complaints:  Patient reports that the \"other knee\" is bothering her now. She does hope to go to the gym tonight. Pain: Initial:   5-6/10 L knee Post Session:  5/10     Modalities (0 minutes) not performed today  . Manual therapy (15 minutes) to reduce B LE discomfort. Grade 3-4 patellofemoral mobilizations to improve R and L knee to reduce symptoms. Grade 3 physio mobilizations to reduce stiffness in R and L LE for flexion and extension. Grade 3-4 R ankle joint mobilizations to improve R and L ankle mobility. Soft tissue mobilizations to L gastrocsoleus to and L quadriceps/hip flexors to reduce tightness and discomfort. Therapeutic Exercises (25 minutes) for reduced bilateral knee pain and improved bilateral LE strength per grid below. Stretching of B hamstrings in supine (5 x 20\"), stretching to each hip flexor in modified Braden test position (5 x 20\" ea) to reduce muscular tightness and discomfort.       Date:  6-19-18 Date:  6-25-18 Date:  6-27-18 Date  7-2-18 Date  7-5-18   Activity/Exercise Parameters Parameters Parameters     Straight leg raise 3 x 10 R 3 x 10 R 3 x 10 R 3 x 10 ea  3 x 8 ea LE   Side-lying hip ABD 3 x 5 R 3 x 6 R 2 x 8 R 2 x 8 ea 2 x 8 ea LE   Clamshells 1.5# 3 x 10 R Blue, 3 x 10 R Blue, 2 x 10 R Blue, 2 x 10 ea Blue, 3 x 10 ea   Bridging 2 x 10 bilateral 3 x 10 bilateral 3 x 10 bilateral 3 x 10 bilateral    Slantboard calf stretch 3 x 30 \" bilateral 3 x 30\" bilateral 3 x 30\" bilateral 3 x 30\" bilateral 3 x 30\" bilateral   Step ups 4\" 2 x 8 R  4\" 3 x 10 R  4\" 3 x 10 ea   Sit to stands   2 x 10     Shuttle press   75# x 10 bilateral  87.5# 2 x 10 bilateral                                   HEP: No changes today. Patient is to continue with existing HEP. Treatment/Session Assessment:    · Response to Treatment:  Negative Frank's sign to L gastrocsoleus. Increased discomfort today throughout L LE, with L gastrocsoleus very tender to palpation. · Compliance with Program/Exercises: Compliant. · Recommendations/Intent for next treatment session: \"Next visit will focus on reducing R knee pain, hip strenghtening. Continue strenghtening at next treatment. \"  Total Treatment Duration:40 Minutes  PT Patient Time In/Time Out  Time In: 1600  Time Out: 1640    Johna Barthel, PT

## 2018-07-11 ENCOUNTER — HOSPITAL ENCOUNTER (OUTPATIENT)
Dept: PHYSICAL THERAPY | Age: 38
Discharge: HOME | End: 2018-07-11
Payer: COMMERCIAL

## 2018-07-11 PROCEDURE — 97110 THERAPEUTIC EXERCISES: CPT

## 2018-07-11 NOTE — PROGRESS NOTES
Tracy Lugo  : 1980  Payor: Mainor Boo / Plan: [facility] / Product Type: PPO /  [address]  at [facility] [address] TREJO  [address] [facility], [address] [place] [address],[address], [facility] [address]  [phone]   [phone]       OUTPATIENT PHYSICAL THERAPY:Daily Note 18     ICD-10: Treatment Diagnosis: Pain in right knee (M25.561)  Precautions/Allergies:   Lisinopril; Lorabid [loracarbef]; and Lortab [hydrocodone-acetaminophen]   Fall Risk Score: 0 (? 5 = High Risk)  MD Orders: evaluate and treat MEDICAL/REFERRING DIAGNOSIS:  R knee pain   DATE OF ONSET: 2.5 weeks ago  REFERRING PHYSICIAN: Jenny Bates MD  RETURN PHYSICIAN APPOINTMENT: TBD     INITIAL ASSESSMENT:  Pt presents with R knee pain. Pt demonstrates pain, slightly decreased ROM, and bilateral hip weakness which are limiting basic mobility and normal activity level. She will benefit from PT for guided rehab to promote normalized return of motion, strength, and function in order for safe return to routine activities without pain. PROBLEM LIST (Impacting functional limitations):  1. R knee pain  2. Decreased ROM R knee   3. Decreased functional strength R knee/LE   4. Decreased gait skills INTERVENTIONS PLANNED:    1. Thermal and electric modalities, manual therapies for pain. 2. Manual therapies and therapeutic exercises for ROM and strength. 3. Therapeutic exercises for gait and balance  4. Home exercise program   TREATMENT PLAN:  Effective Dates: 2018 TO 2018. Frequency/Duration: 2 visits per week for  8 weeks  GOALS: (Goals have been discussed and agreed upon with patient.)   Short-Term Functional Goals: Time Frame: 4 weeks  1. Report no more than minimal, intermittent 0-1/10 pain R knee with basic walking and ADL's, and score greater than 50/80 on the LEFS. 2. R quad strength is 5/5 with good terminal extension strength and no pain for stability with walking and progressing into strengthening phase. 3. Walking without limp on level surfaces and up stairs. Discharge Goals: Time Frame: 8 weeks  1. No significant pain R knee with all normalized daily home and work activities, and score greater than 60/80 on the LEFS. 2. Demonstrate R knee ROM equal to the L, and no pain when ambulating on treadmill x 30 minutes. 3. Able to balance with good control in single-leg stand greater than 15 seconds with eyes open, greater than 10 seconds with eyes closed for improved dynamic stability. 4. Independent with HEP for advanced knee strengthening. Rehabilitation Potential For Stated Goals: Good                                                                                The information in this section was collected on 6-14-18 (except where otherwise noted). HISTORY:   History of Present Injury/Illness (Reason for Referral):  Patient reports that she began experiencing a sharp pain in her R knee when ambulating for exercise on an inclined treadmill. Patient has had to cease her walking due to this knee pain and sought medical attention due to the increased pain when ambulating. Past Medical History/Comorbidities: Ms. Kacey Cheatham  has a past medical history of DVT of leg (deep venous thrombosis) ([address] Encounter for insertion of mirena IUD (01/03/2016); GERD (gastroesophageal reflux disease); H/O seasonal allergies; Hypertension; Hypertension; Irregular periods; Menstrual migraine without status migrainosus, not intractable (10/19/2015); Migraines; Morbid obesity ([address] Psychiatric disorder; Pulmonary embolus ([address] and Sleep disorder. She also has no past medical history of Aneurysm ([address] Arrhythmia; Arthritis; Asthma; Autoimmune disease ([address] CAD (coronary artery disease); Cancer ([address] Chronic kidney disease; Chronic obstructive pulmonary disease ([address] Chronic pain; Coagulation disorder ([address] Diabetes ([address] Difficult intubation; Endocarditis; Heart failure ([address]  Liver disease; Malignant hyperthermia due to anesthesia; Nausea & vomiting; Nicotine vapor product user; Non-nicotine vapor product user; Pseudocholinesterase deficiency; PUD (peptic ulcer disease); Rheumatic fever; Seizures ([address] Sleep apnea; Stroke [facility]); or Thyroid disease. Ms. Carlo Peguero  has a past surgical history that includes hx cholecystectomy (2000); pr biopsy of breast, incisional; pr biopsy of breast, needle core; hx tonsillectomy (N/A); hx gyn (11/09/2017); and hx breast biopsy (Bilateral). Social History/Living Environment: Patient lives with family in a two-story apartment which is on the first floor. 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 She was exercising 30 minutes at [employer] prior to symptom onset. Current Medications:       Current Outpatient Prescriptions:     ibuprofen (MOTRIN) 800 mg tablet, Take 1 Tab by mouth every eight (8) hours as needed for Pain., Disp: 30 Tab, Rfl: 0    cetirizine (ZYRTEC) 10 mg tablet, Take 1 Tab by mouth daily as needed for Allergies. , Disp: 30 Tab, Rfl: 0    multivitamin (ONE A DAY) tablet, Take 1 Tab by mouth daily. Indications: VITAMIN DEFICIENCY PREVENTION, Disp: , Rfl:     OTHER, daily. Black Seed oil, Disp: , Rfl:     sertraline (ZOLOFT) 25 mg tablet, Take 25 mg by mouth daily. Take / use AM day of surgery  per anesthesia protocols. Indications: Generalized Anxiety Disorder, Disp: , Rfl:    Date Last Reviewed:  7-11-18   Number of Personal Factors/Comorbidities that affect the Plan of Care: 1-2: MODERATE COMPLEXITY   EXAMINATION:   6-14-18  Observation/Orthostatic Postural Assessment: B genu valgus and bilateral pronation noted when standing and when shifting weight onto each foot. No swelling noted to R knee. Palpation: Tenderness to palpation of inferior pole of R patella and along patellar tendon. Minimal discomfort at medal and lateral joint line on R knee.  Reduced R patellar inferior and superior glide as compared to uninvolved side. ROM:    L knee: 8 degrees of hyperextension to 120 degrees of flexoin    Bilateral hip flexion, abduction and extension range of motion within functional limits              R knee: 2 degrees of hyperextension to 120 degrees of flexion      R ankle dorsiflexion reduced via observation (no formal measures taken). Hx of R ankle fracture. Strength:   L hip flexion 5/5  L hip abduction 4/5  L hip extension 4/5  L knee extension 5/5  L knee flexion 5/5  L ankle DF 5/5  R hip flexion 5/5   R hip abduction 4-/5   R hip extension 4/5  R knee ext 4+/5 w/pain  R knee flexion 5/5  R ankle DF 5/5      Special Tests: 'Negative meniscal tests (Letha's, Apley's Compression/distraction, Ege's)  Neurological Screen: [place] neuro screen is unremarkable. Normal sensation to light touch along both LEs at all dermatomes. Functional Mobility:  Sit to/from Stand: independent. Bed Mobility: independent  Walking on level ground: mildly antalgic gait pattern with reduced stance time on R LE. Bilateral genu valgus and bilateral pronation. Stair walking: Reciprocal gait to ascend stairs with use of bilateral handrails but step-to gait pattern to descend stairs with use of bilateral handrails. Balance:  Single-leg stand: firm surface/eyes open L= 8 seconds, R= 5 seconds           Body Structures Involved:  1. Nerves  2. Bones  3. Joints  4. Muscles Body Functions Affected:  1. Sensory/Pain  2. Neuromusculoskeletal  3. Movement Related Activities and Participation Affected:  1. Mobility  2. Interpersonal Interactions and Relationships  3. Community, Social and [place]   Number of elements (examined above) that affect the Plan of Care: 4+: HIGH COMPLEXITY   CLINICAL PRESENTATION:   Presentation: Stable and uncomplicated: LOW COMPLEXITY   CLINICAL DECISION MAKING:   Outcome Measure:    Tool Used: Lower Extremity Functional Scale (LEFS)  Score:  Initial: 31/80 Most Recent: X/80 (Date: -- )   Interpretation of Score: 20 questions each scored on a 5 point scale with 0 representing \"extreme difficulty or unable to perform\" and 4 representing \"no difficulty\". The lower the score, the greater the functional disability. 80/80 represents no disability. Minimal detectable change is 9 points. Medical Necessity:   · Skilled intervention continues to be required due to increased R knee pain which limits the patient's mobility. Reason for Services/Other Comments:  · Patient continues to require skilled intervention due to increased R knee pain. Use of outcome tool(s) and clinical judgement create a POC that gives a: Clear prediction of patient's progress: LOW COMPLEXITY            TREATMENT:   (In addition to Assessment/Re-Assessment sessions the following treatments were rendered)  Pre-treatment Symptoms/Complaints:  Patient reports that she continues to have no knee pain. Pain: Initial:   0/10 L and R knee Post Session:  0/10     Modalities (0 minutes) not performed today  . Manual therapy (5 minutes) for improved ankle mobility. Grade 3-4 ankle joint mobilizations for improved dorsiflexion bilaterally. Therapeutic Exercises (25 minutes) for reduced bilateral knee pain and improved bilateral LE strength per grid below. Stretching of B hamstrings in supine (5 x 20\"), stretching to each hip flexor in modified Braden test position (5 x 20\" ea) and B quadriceps (5 x 20\" ea) in prone to reduce muscular tightness.       Date:  6-19-18 Date:  6-25-18 Date:  6-27-18 Date  7-2-18 Date  7-5-18 Date  7-9-18 Date  7-11-18   Activity/Exercise Parameters Parameters Parameters       Straight leg raise 3 x 10 R 3 x 10 R 3 x 10 R 3 x 10 ea  3 x 8 ea LE     Side-lying hip ABD 3 x 5 R 3 x 6 R 2 x 8 R 2 x 8 ea 2 x 8 ea LE 2 x 8 ea LE    Clamshells 1.5# 3 x 10 R Blue, 3 x 10 R Blue, 2 x 10 R Blue, 2 x 10 ea Blue, 3 x 10 ea Black, 3 x 10 ea Black 2 x 10 ea   Bridging 2 x 10 bilateral 3 x 10 bilateral 3 x 10 bilateral 3 x 10 bilateral  2 x 10 bilateral 3 x 10 bilateral   Slantboard calf stretch 3 x 30 \" bilateral 3 x 30\" bilateral 3 x 30\" bilateral 3 x 30\" bilateral 3 x 30\" bilateral 3 x 30\" bilateral 3 x 30\" bilaterla   Step ups 4\" 2 x 8 R  4\" 3 x 10 R  4\" 3 x 10 ea 4\" 3 x 10 ea 4\" x 10 ea  6# 2 x 10 ea   Sit to stands   2 x 10    From weight bench, 3 x 10   Shuttle press   75# x 10 bilateral  87.5# 2 x 10 bilateral       Squats      With TRX supports, 2 x 10     Single limb stance       3 x 10\" on each LE                   HEP: No changes today. Patient is to continue with existing HEP. Treatment/Session Assessment:    · Response to Treatment:  Patient continues to demonstrate excellent progress with knee pain. · Compliance with Program/Exercises: Compliant. · Recommendations/Intent for next treatment session: \"Next visit will focus on reducing R knee pain, hip strenghtening. Continue strenghtening at next treatment. \"  Total Treatment Duration:30 Minutes  PT Patient Time In/Time Out  Time In: 0368  Time Out: [facility], PT

## 2018-07-16 ENCOUNTER — HOSPITAL ENCOUNTER (OUTPATIENT)
Dept: PHYSICAL THERAPY | Age: 38
Discharge: HOME | End: 2018-07-16
Payer: COMMERCIAL

## 2018-07-16 PROCEDURE — 97110 THERAPEUTIC EXERCISES: CPT

## 2018-07-16 NOTE — PROGRESS NOTES
Aamir Petersen  : 1980  Payor: Phyllis Johnson / Plan: [place] CROSS OF [address] / Product Type: PPO /  [address]  at [facility] [address]  [address] [place] [address], [facility] [address], [address]  [phone]   [phone]       OUTPATIENT PHYSICAL THERAPY:Daily Note and Progress Report 18     ICD-10: Treatment Diagnosis: Pain in right knee (M25.561)  Precautions/Allergies:   Lisinopril; Lorabid [loracarbef]; and Lortab [hydrocodone-acetaminophen]   Fall Risk Score: 0 (? 5 = High Risk)  MD Orders: evaluate and treat MEDICAL/REFERRING DIAGNOSIS:  R knee pain   DATE OF ONSET: 2.5 weeks ago  REFERRING PHYSICIAN: Karen Mayfield MD  RETURN PHYSICIAN APPOINTMENT: TBD     PROGRESS ASSESSMENT (18):  Pt presents with R knee pain. Pt demonstrates pain, slightly decreased ROM, and bilateral hip weakness which are limiting basic mobility and normal activity level. She will benefit from PT for guided rehab to promote normalized return of motion, strength, and function in order for safe return to routine activities without pain. PROBLEM LIST (Impacting functional limitations):  1. R knee pain  2. Decreased ROM R knee   3. Decreased functional strength R knee/LE   4. Decreased gait skills INTERVENTIONS PLANNED:    1. Thermal and electric modalities, manual therapies for pain. 2. Manual therapies and therapeutic exercises for ROM and strength. 3. Therapeutic exercises for gait and balance  4. Home exercise program   TREATMENT PLAN:  Effective Dates: 2018 TO 2018. Frequency/Duration: 2 visits per week for  8 weeks  GOALS: (Goals have been discussed and agreed upon with patient.)   Short-Term Functional Goals: Time Frame: 4 weeks  1. Report no more than minimal, intermittent 0-1/10 pain R knee with basic walking and ADL's, and score greater than 50/80 on the LEFS. MET 18  2.  R quad strength is 5/5 with good terminal extension strength and no pain for stability with walking and progressing into strengthening phase. MET per absence of quad lag with straight leg raises 7-16-18  3. Walking without limp on level surfaces and up stairs. MET 7-16-18  Discharge Goals: Time Frame: 8 weeks  1. No significant pain R knee with all normalized daily home and work activities, and score greater than 60/80 on the LEFS. MET 7-16-18  2. Demonstrate R knee ROM equal to the L, and no pain when ambulating on treadmill x 30 minutes. Ongoing, progressing 7-16-18  3. Able to balance with good control in single-leg stand greater than 15 seconds with eyes open, greater than 10 seconds with eyes closed for improved dynamic stability. Ongoing, progressing 7-16-18  4. Independent with [facility] for advanced knee strengthening. Rehabilitation Potential For Stated Goals: Good                                                                                The information in this section was collected on 6-14-18 (except where otherwise noted). HISTORY:   History of Present Injury/Illness (Reason for Referral):  Patient reports that she began experiencing a sharp pain in her R knee when ambulating for exercise on an inclined treadmill. Patient has had to cease her walking due to this knee pain and sought medical attention due to the increased pain when ambulating. Past Medical History/Comorbidities: Ms. Aria Washington  has a past medical history of DVT of leg (deep venous thrombosis) ([address] Encounter for insertion of mirena IUD (01/03/2016); GERD (gastroesophageal reflux disease); H/O seasonal allergies; Hypertension; Hypertension; Irregular periods; Menstrual migraine without status migrainosus, not intractable (10/19/2015); Migraines; Morbid obesity ([address] Psychiatric disorder; Pulmonary embolus ([address] and Sleep disorder. She also has no past medical history of Aneurysm ([address] Arrhythmia; Arthritis; Asthma; Autoimmune disease ([address] CAD (coronary artery disease); Cancer ([address]  Chronic kidney disease; Chronic obstructive pulmonary disease ([facility] Chronic pain; Coagulation disorder ([facility] [address] Diabetes ([facility] [address] Difficult intubation; Endocarditis; Heart failure ([facility] [address] Liver disease; Malignant hyperthermia due to anesthesia; Nausea & vomiting; Nicotine vapor product user; Non-nicotine vapor product user; Pseudocholinesterase deficiency; PUD (peptic ulcer disease); Rheumatic fever; Seizures ([facility] [address] Sleep apnea; Stroke [facility]); or Thyroid disease. Ms. Liz Vasquez  has a past surgical history that includes hx cholecystectomy (2000); pr biopsy of breast, incisional; pr biopsy of breast, needle core; hx tonsillectomy (N/A); hx gyn (11/09/2017); and hx breast biopsy (Bilateral). Social History/Living Environment: Patient lives with family in a two-story apartment which is on the first floor. 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 She was exercising 30 minutes at [employer] prior to symptom onset. Current Medications:       Current Outpatient Prescriptions:     ibuprofen (MOTRIN) 800 mg tablet, Take 1 Tab by mouth every eight (8) hours as needed for Pain., Disp: 30 Tab, Rfl: 0    cetirizine (ZYRTEC) 10 mg tablet, Take 1 Tab by mouth daily as needed for Allergies. , Disp: 30 Tab, Rfl: 0    multivitamin (ONE A DAY) tablet, Take 1 Tab by mouth daily. Indications: VITAMIN DEFICIENCY PREVENTION, Disp: , Rfl:     OTHER, daily. Black Seed oil, Disp: , Rfl:     sertraline (ZOLOFT) 25 mg tablet, Take 25 mg by mouth daily. Take / use AM day of surgery  per anesthesia protocols. Indications: Generalized Anxiety Disorder, Disp: , Rfl:    Date Last Reviewed:  7-11-18   Number of Personal Factors/Comorbidities that affect the Plan of Care: 1-2: MODERATE COMPLEXITY   EXAMINATION:   7-16-18  Observation/Orthostatic Postural Assessment: B genu valgus and bilateral pronation noted when standing and ambulating    Palpation: No increased tenderness to either knee.     ROM:    Bilateral knee active ROM grossly equivalent                    Strength:   L hip abduction 5/5   R hip abduction 5/5      Special Tests: none performed  Neurological Screen: not tested  Functional Mobility:  Sit to/from Stand: independent. Bed Mobility: independent  Walking on level ground: no antalgic gait pattern demonstrated when ambulating on level ground. Stair ascent/descent: reciprocal gait pattern utilized to to ascend and descend stairs with intermittent use of single handrail          Balance:  Single-leg stand: firm surface/eyes open L= 12 seconds, R= 15 seconds           Body Structures Involved:  1. Nerves  2. Bones  3. Joints  4. Muscles Body Functions Affected:  1. Sensory/Pain  2. Neuromusculoskeletal  3. Movement Related Activities and Participation Affected:  1. Mobility  2. Interpersonal Interactions and Relationships  3. Community, Social and [place]   Number of elements (examined above) that affect the Plan of Care: 4+: HIGH COMPLEXITY   CLINICAL PRESENTATION:   Presentation: Stable and uncomplicated: LOW COMPLEXITY   CLINICAL DECISION MAKING:   Outcome Measure: Tool Used: Lower Extremity Functional Scale (LEFS)  Score:  Initial: 31/80 Most Recent: 60/80 (Date: 7-16-18 )   Interpretation of Score: 20 questions each scored on a 5 point scale with 0 representing \"extreme difficulty or unable to perform\" and 4 representing \"no difficulty\". The lower the score, the greater the functional disability. 80/80 represents no disability. Minimal detectable change is 9 points. Medical Necessity:   · Skilled intervention continues to be required due to increased R knee pain which limits the patient's mobility. Reason for Services/Other Comments:  · Patient continues to require skilled intervention due to increased R knee pain.    Use of outcome tool(s) and clinical judgement create a POC that gives a: Clear prediction of patient's progress: LOW COMPLEXITY            TREATMENT:   (In addition to Assessment/Re-Assessment sessions the following treatments were rendered)  Pre-treatment Symptoms/Complaints:  Patient reports very mild discomfort to B knees. Has   Pain: Initial:   0-1/10 L and R knee Post Session:  0/10     Modalities (0 minutes) not performed today  . Manual therapy (0 minutes) not performed today    Therapeutic Exercises (40 minutes) for reduced bilateral knee pain and improved bilateral LE strength per grid below. Stretching of B hamstrings in supine (5 x 20\"), and B quadriceps (5 x 20\" ea) in prone to reduce muscular tightness. Date:  6-19-18 Date:  6-25-18 Date:  6-27-18 Date  7-2-18 Date  7-5-18 Date  7-9-18 Date  7-11-18 Date  7-16-18   Activity/Exercise Parameters Parameters Parameters        Straight leg raise 3 x 10 R 3 x 10 R 3 x 10 R 3 x 10 ea  3 x 8 ea LE      Side-lying hip ABD 3 x 5 R 3 x 6 R 2 x 8 R 2 x 8 ea 2 x 8 ea LE 2 x 8 ea LE     Clamshells 1.5# 3 x 10 R Blue, 3 x 10 R Blue, 2 x 10 R Blue, 2 x 10 ea Blue, 3 x 10 ea Black, 3 x 10 ea Black 2 x 10 ea Black 3 x 10 ea   Bridging 2 x 10 bilateral 3 x 10 bilateral 3 x 10 bilateral 3 x 10 bilateral  2 x 10 bilateral 3 x 10 bilateral 3 x 10 bilat   Slantboard calf stretch 3 x 30 \" bilateral 3 x 30\" bilateral 3 x 30\" bilateral 3 x 30\" bilateral 3 x 30\" bilateral 3 x 30\" bilateral 3 x 30\" bilaterla 3 x 30\" bilateral   Step ups 4\" 2 x 8 R  4\" 3 x 10 R  4\" 3 x 10 ea 4\" 3 x 10 ea 4\" x 10 ea  6# 2 x 10 ea 6\" 2  x10 ea    1 flight of stairs w/occasional use of single handrail   Sit to stands   2 x 10    From weight bench, 3 x 10 From weight bench, 3 x 10   Shuttle press   75# x 10 bilateral  87.5# 2 x 10 bilateral        Squats      With TRX supports, 2 x 10      Single limb stance       3 x 10\" on each LE 3 trials of single leg stance on each LE                    HEP: No changes today. Patient is to continue with existing HEP. Treatment/Session Assessment:    · Response to Treatment:  Patient shows good progress with knee symptoms. Patient has minimal difficulties with performance of any therapeutic exercises, just audible crepitus, which does not accompany pain. · Compliance with Program/Exercises: Compliant. · Recommendations/Intent for next treatment session: \"Next visit will focus on reducing R knee pain, hip strenghtening. Continue strenghtening at next treatment. Reduce frequency to one visit per week in preparation of discharge to [facility] in next 3-4 weeks pending knee symptoms.   Total Treatment Duration:40 Minutes  PT Patient Time In/Time Out  Time In: 5302  Time Out: [id number] Ezequiel Colby, PT

## 2018-07-30 ENCOUNTER — HOSPITAL ENCOUNTER (OUTPATIENT)
Dept: PHYSICAL THERAPY | Age: 38
Discharge: HOME | End: 2018-07-30
Payer: COMMERCIAL

## 2018-07-30 PROCEDURE — 97140 MANUAL THERAPY 1/> REGIONS: CPT

## 2018-07-30 PROCEDURE — 97110 THERAPEUTIC EXERCISES: CPT

## 2018-07-30 NOTE — PROGRESS NOTES
Any Justin  : 1980  Payor: Gabino Cantor / [place] [facility] / Product Type: PPO /  [id number]  at [facility] [address]  1101 [facility], [address] [place] [address],[address], [facility] [address]  [phone]   [phone]       OUTPATIENT PHYSICAL THERAPY:Daily Note 18     ICD-10: Treatment Diagnosis: Pain in right knee (M25.561)  Precautions/Allergies:   Lisinopril; Lorabid [loracarbef]; and Lortab [hydrocodone-acetaminophen]   Fall Risk Score: 0 (? 5 = High Risk)  MD Orders: evaluate and treat MEDICAL/REFERRING DIAGNOSIS:  R knee pain   DATE OF ONSET: 2.5 weeks ago  REFERRING PHYSICIAN: Allegra Childers MD  RETURN PHYSICIAN APPOINTMENT: TBD     PROGRESS ASSESSMENT (18):  Pt presents with R knee pain. Pt demonstrates pain, slightly decreased ROM, and bilateral hip weakness which are limiting basic mobility and normal activity level. She will benefit from PT for guided rehab to promote normalized return of motion, strength, and function in order for safe return to routine activities without pain. PROBLEM LIST (Impacting functional limitations):  1. R knee pain  2. Decreased ROM R knee   3. Decreased functional strength R knee/LE   4. Decreased gait skills INTERVENTIONS PLANNED:    1. Thermal and electric modalities, manual therapies for pain. 2. Manual therapies and therapeutic exercises for ROM and strength. 3. Therapeutic exercises for gait and balance  4. Home exercise program   TREATMENT PLAN:  Effective Dates: 2018 TO 2018. Frequency/Duration: 2 visits per week for  8 weeks  GOALS: (Goals have been discussed and agreed upon with patient.)   Short-Term Functional Goals: Time Frame: 4 weeks  1. Report no more than minimal, intermittent 0-1/10 pain R knee with basic walking and ADL's, and score greater than 50/80 on the LEFS. MET 18  2.  R quad strength is 5/5 with good terminal extension strength and no pain for stability with walking and progressing into strengthening phase. MET per absence of quad lag with straight leg raises 7-16-18  3. Walking without limp on level surfaces and up stairs. MET 7-16-18  Discharge Goals: Time Frame: 8 weeks  1. No significant pain R knee with all normalized daily home and work activities, and score greater than 60/80 on the LEFS. MET 7-16-18  2. Demonstrate R knee ROM equal to the L, and no pain when ambulating on treadmill x 30 minutes. Ongoing, progressing 7-16-18  3. Able to balance with good control in single-leg stand greater than 15 seconds with eyes open, greater than 10 seconds with eyes closed for improved dynamic stability. Ongoing, progressing 7-16-18  4. Independent with [facility] for advanced knee strengthening. Rehabilitation Potential For Stated Goals: Good                                                                                The information in this section was collected on 6-14-18 (except where otherwise noted). HISTORY:   History of Present Injury/Illness (Reason for Referral):  Patient reports that she began experiencing a sharp pain in her R knee when ambulating for exercise on an inclined treadmill. Patient has had to cease her walking due to this knee pain and sought medical attention due to the increased pain when ambulating. Past Medical History/Comorbidities: Ms. Dominique Jiang  has a past medical history of DVT of leg (deep venous thrombosis) ([address] Encounter for insertion of mirena IUD (01/03/2016); GERD (gastroesophageal reflux disease); H/O seasonal allergies; Hypertension; Hypertension; Irregular periods; Menstrual migraine without status migrainosus, not intractable (10/19/2015); Migraines; Morbid obesity ([address] Psychiatric disorder; Pulmonary embolus ([address] and Sleep disorder. She also has no past medical history of Aneurysm ([address] Arrhythmia; Arthritis; Asthma; Autoimmune disease ([address] CAD (coronary artery disease); Cancer ([address]  Chronic kidney disease; Chronic obstructive pulmonary disease ([address] Chronic pain; Coagulation disorder ([address] Diabetes ([address] Difficult intubation; Endocarditis; Heart failure ([address] Liver disease; Malignant hyperthermia due to anesthesia; Nausea & vomiting; Nicotine vapor product user; Non-nicotine vapor product user; Pseudocholinesterase deficiency; PUD (peptic ulcer disease); Rheumatic fever; Seizures ([address] Sleep apnea; Stroke [facility]); or Thyroid disease. Ms. Cherie Cr  has a past surgical history that includes hx cholecystectomy (2000); pr biopsy of breast, incisional; pr biopsy of breast, needle core; hx tonsillectomy (N/A); hx gyn (11/09/2017); and hx breast biopsy (Bilateral). Social History/Living Environment: Patient lives with family in a two-story apartment which is on the first floor. 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 She was exercising 30 minutes at [employer] prior to symptom onset. Current Medications:       Current Outpatient Prescriptions:     ibuprofen (MOTRIN) 800 mg tablet, Take 1 Tab by mouth every eight (8) hours as needed for Pain., Disp: 30 Tab, Rfl: 0    multivitamin (ONE A DAY) tablet, Take 1 Tab by mouth daily. Indications: VITAMIN DEFICIENCY PREVENTION, Disp: , Rfl:     OTHER, daily. Black Seed oil, Disp: , Rfl:        Date Last Reviewed:  7-30-18   Number of Personal Factors/Comorbidities that affect the Plan of Care: 1-2: MODERATE COMPLEXITY   EXAMINATION:   7-16-18  Observation/Orthostatic Postural Assessment: B genu valgus and bilateral pronation noted when standing and ambulating    Palpation: No increased tenderness to either knee. ROM:    Bilateral knee active ROM grossly equivalent                    Strength:   L hip abduction 5/5   R hip abduction 5/5      Special Tests: none performed  Neurological Screen: not tested  Functional Mobility:  Sit to/from Stand: independent.  Bed Mobility: independent  Walking on level ground: no antalgic gait pattern demonstrated when ambulating on level ground. Stair ascent/descent: reciprocal gait pattern utilized to to ascend and descend stairs with intermittent use of single handrail          Balance:  Single-leg stand: firm surface/eyes open L= 12 seconds, R= 15 seconds           Body Structures Involved:  1. Nerves  2. Bones  3. Joints  4. Muscles Body Functions Affected:  1. Sensory/Pain  2. Neuromusculoskeletal  3. Movement Related Activities and Participation Affected:  1. Mobility  2. Interpersonal Interactions and Relationships  3. Community, Social and [place]   Number of elements (examined above) that affect the Plan of Care: 4+: HIGH COMPLEXITY   CLINICAL PRESENTATION:   Presentation: Stable and uncomplicated: LOW COMPLEXITY   CLINICAL DECISION MAKING:   Outcome Measure: Tool Used: Lower Extremity Functional Scale (LEFS)  Score:  Initial: 31/80 Most Recent: 60/80 (Date: 7-16-18 )   Interpretation of Score: 20 questions each scored on a 5 point scale with 0 representing \"extreme difficulty or unable to perform\" and 4 representing \"no difficulty\". The lower the score, the greater the functional disability. 80/80 represents no disability. Minimal detectable change is 9 points. Medical Necessity:   · Skilled intervention continues to be required due to increased R knee pain which limits the patient's mobility. Reason for Services/Other Comments:  · Patient continues to require skilled intervention due to increased R knee pain. Use of outcome tool(s) and clinical judgement create a POC that gives a: Clear prediction of patient's progress: LOW COMPLEXITY            TREATMENT:   (In addition to Assessment/Re-Assessment sessions the following treatments were rendered)  Pre-treatment Symptoms/Complaints:  Patient reports that she did not have any real knee pain while on vacation despite frequent bouts of walking while in [facility] [place]. States that her legs have just been sore all over today.   Pain: Initial: None reported Post Session:  No knee pain, just leg soreness     Modalities (0 minutes) not performed today  . Manual therapy (15 minutes) to reduce knee discomfort. Grade 3-4 patellofemoral joint mobilizations to faciliate patellar mobilty. Grade 3-4 R ankle joint mobilizations to improve R ankle mobility. Grade 3-4 physio mobilizations to R knee to reduce discomfort. Therapeutic Exercises ( 24 minutes) for reduced bilateral knee pain and improved bilateral LE strength per grid below.  Stretching of B hamstrings in supine (5 x 20\")      Date:  6-19-18 Date:  6-25-18 Date:  6-27-18 Date  7-2-18 Date  7-5-18 Date  7-9-18 Date  7-11-18 Date  7-16-18 Date  7-18-18 Date  7-30-18   Activity/Exercise Parameters Parameters Parameters          Straight leg raise 3 x 10 R 3 x 10 R 3 x 10 R 3 x 10 ea  3 x 8 ea LE        Side-lying hip ABD 3 x 5 R 3 x 6 R 2 x 8 R 2 x 8 ea 2 x 8 ea LE 2 x 8 ea LE   0# x 10, 1# 2 x 10 ea LE    Clamshells 1.5# 3 x 10 R Blue, 3 x 10 R Blue, 2 x 10 R Blue, 2 x 10 ea Blue, 3 x 10 ea Black, 3 x 10 ea Black 2 x 10 ea Black 3 x 10 ea Black, 3 x 10 ea Black 3 x 10   Bridging 2 x 10 bilateral 3 x 10 bilateral 3 x 10 bilateral 3 x 10 bilateral  2 x 10 bilateral 3 x 10 bilateral 3 x 10 bilat 3 x 10 bilateral 3 x 10 bilateral   Slantboard calf stretch 3 x 30 \" bilateral 3 x 30\" bilateral 3 x 30\" bilateral 3 x 30\" bilateral 3 x 30\" bilateral 3 x 30\" bilateral 3 x 30\" bilaterla 3 x 30\" bilateral 3 x 30\" bilateral 3 x 30\" bilateral   Step ups 4\" 2 x 8 R  4\" 3 x 10 R  4\" 3 x 10 ea 4\" 3 x 10 ea 4\" x 10 ea  6# 2 x 10 ea 6\" 2  x10 ea    1 flight of stairs w/occasional use of single handrail 6\" 3 x 10 ea 8\" 2 x 10 ea   Sit to stands   2 x 10    From weight bench, 3 x 10 From weight bench, 3 x 10  From standard chair 3 x 10 B   Shuttle press   75# x 10 bilateral  87.5# 2 x 10 bilateral       Shuttle press  75# x 10 B  100# 2 x 10 B   Squats      With TRX supports, 2 x 10    Shuttle press 75# 1 x 10, 100# 2 x 10 bilateral     Single limb stance       3 x 10\" on each LE 3 trials of single leg stance on each LE                        HEP: No changes today. Patient is to continue with existing HEP. Treatment/Session Assessment:    · Response to Treatment:  Patient continues to demonstrate progress with B LE symptoms. · Compliance with Program/Exercises: Compliant. · Recommendations/Intent for next treatment session: \"Next visit will focus on reducing R knee pain, hip strenghtening. Continue strenghtening at next treatment. Reduce frequency to one visit per week in preparation of discharge to [facility] in next 3-4 weeks pending knee symptoms.   Total Treatment Duration: 45 Minutes  PT Patient Time In/Time Out  Time In: 6727  Time Out: Silvano Wick PT

## 2018-08-06 ENCOUNTER — HOSPITAL ENCOUNTER (OUTPATIENT)
Dept: PHYSICAL THERAPY | Age: 38
Discharge: HOME | End: 2018-08-06
Payer: COMMERCIAL

## 2018-08-06 PROCEDURE — 97140 MANUAL THERAPY 1/> REGIONS: CPT

## 2018-08-06 PROCEDURE — 97110 THERAPEUTIC EXERCISES: CPT

## 2018-08-06 NOTE — PROGRESS NOTES
Leydi Paniagua  : 1980  Payor: Zachary Laws / [place] OF [address] [place] [address] Rd / Product Type: PPO /  [address]  at [facility] [address]  1101 [facility], [address] [place] [address],[address], [facility] [address]  [phone]   [phone]       OUTPATIENT PHYSICAL THERAPY:Daily Note 2018     ICD-10: Treatment Diagnosis: Pain in right knee (M25.561)  Precautions/Allergies:   Lisinopril; Lorabid [loracarbef]; and Lortab [hydrocodone-acetaminophen]   Fall Risk Score: 0 (? 5 = High Risk)  MD Orders: evaluate and treat MEDICAL/REFERRING DIAGNOSIS:  R knee pain   DATE OF ONSET: 2.5 weeks ago  REFERRING PHYSICIAN: Mary Ash MD  RETURN PHYSICIAN APPOINTMENT: TBD     PROGRESS ASSESSMENT (18):  Pt presents with R knee pain. Pt demonstrates pain, slightly decreased ROM, and bilateral hip weakness which are limiting basic mobility and normal activity level. She will benefit from PT for guided rehab to promote normalized return of motion, strength, and function in order for safe return to routine activities without pain. PROBLEM LIST (Impacting functional limitations):  1. R knee pain  2. Decreased ROM R knee   3. Decreased functional strength R knee/LE   4. Decreased gait skills INTERVENTIONS PLANNED:    1. Thermal and electric modalities, manual therapies for pain. 2. Manual therapies and therapeutic exercises for ROM and strength. 3. Therapeutic exercises for gait and balance  4. Home exercise program   TREATMENT PLAN:  Effective Dates: 2018 TO 2018. Frequency/Duration: 2 visits per week for  8 weeks  GOALS: (Goals have been discussed and agreed upon with patient.)   Short-Term Functional Goals: Time Frame: 4 weeks  1. Report no more than minimal, intermittent 0-1/10 pain R knee with basic walking and ADL's, and score greater than 50/80 on the LEFS. MET 18  2.  R quad strength is 5/5 with good terminal extension strength and no pain for stability with walking and progressing into strengthening phase. MET per absence of quad lag with straight leg raises 7-16-18  3. Walking without limp on level surfaces and up stairs. MET 7-16-18  Discharge Goals: Time Frame: 8 weeks  1. No significant pain R knee with all normalized daily home and work activities, and score greater than 60/80 on the LEFS. MET 7-16-18  2. Demonstrate R knee ROM equal to the L, and no pain when ambulating on treadmill x 30 minutes. Ongoing, progressing 7-16-18  3. Able to balance with good control in single-leg stand greater than 15 seconds with eyes open, greater than 10 seconds with eyes closed for improved dynamic stability. Ongoing, progressing 7-16-18  4. Independent with [facility] for advanced knee strengthening. Rehabilitation Potential For Stated Goals: Good                                                                                The information in this section was collected on 6-14-18 (except where otherwise noted). HISTORY:   History of Present Injury/Illness (Reason for Referral):  Patient reports that she began experiencing a sharp pain in her R knee when ambulating for exercise on an inclined treadmill. Patient has had to cease her walking due to this knee pain and sought medical attention due to the increased pain when ambulating. Past Medical History/Comorbidities: Ms. Sher Santana  has a past medical history of DVT of leg (deep venous thrombosis) ([address] Encounter for insertion of mirena IUD (01/03/2016); GERD (gastroesophageal reflux disease); H/O seasonal allergies; Hypertension; Hypertension; Irregular periods; Menstrual migraine without status migrainosus, not intractable (10/19/2015); Migraines; Morbid obesity ([address] Psychiatric disorder; Pulmonary embolus ([address] and Sleep disorder. She also has no past medical history of Aneurysm ([address] Arrhythmia; Arthritis; Asthma; Autoimmune disease ([address] CAD (coronary artery disease); Cancer ([address]  Chronic kidney disease; Chronic obstructive pulmonary disease ([address] Chronic pain; Coagulation disorder ([address] Diabetes ([address] Difficult intubation; Endocarditis; Heart failure ([address] Liver disease; Malignant hyperthermia due to anesthesia; Nausea & vomiting; Nicotine vapor product user; Non-nicotine vapor product user; Pseudocholinesterase deficiency; PUD (peptic ulcer disease); Rheumatic fever; Seizures ([address] Sleep apnea; Stroke [facility]); or Thyroid disease. Ms. Edwin Price  has a past surgical history that includes hx cholecystectomy (2000); pr biopsy of breast, incisional; pr biopsy of breast, needle core; hx tonsillectomy (N/A); hx gyn (11/09/2017); and hx breast biopsy (Bilateral). Social History/Living Environment: Patient lives with family in a two-story apartment which is on the first floor. 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 She was exercising 30 minutes at [employer] prior to symptom onset. Current Medications:       Current Outpatient Prescriptions:     ibuprofen (MOTRIN) 800 mg tablet, Take 1 Tab by mouth every eight (8) hours as needed for Pain., Disp: 30 Tab, Rfl: 0    multivitamin (ONE A DAY) tablet, Take 1 Tab by mouth daily. Indications: VITAMIN DEFICIENCY PREVENTION, Disp: , Rfl:     OTHER, daily. Black Seed oil, Disp: , Rfl:        Date Last Reviewed:  7-30-18   Number of Personal Factors/Comorbidities that affect the Plan of Care: 1-2: MODERATE COMPLEXITY   EXAMINATION:   7-16-18  Observation/Orthostatic Postural Assessment: B genu valgus and bilateral pronation noted when standing and ambulating    Palpation: No increased tenderness to either knee. ROM:    Bilateral knee active ROM grossly equivalent                    Strength:   L hip abduction 5/5   R hip abduction 5/5      Special Tests: none performed  Neurological Screen: not tested  Functional Mobility:  Sit to/from Stand: independent.  Bed Mobility: independent  Walking on level ground: no antalgic gait pattern demonstrated when ambulating on level ground. Stair ascent/descent: reciprocal gait pattern utilized to to ascend and descend stairs with intermittent use of single handrail          Balance:  Single-leg stand: firm surface/eyes open L= 12 seconds, R= 15 seconds           Body Structures Involved:  1. Nerves  2. Bones  3. Joints  4. Muscles Body Functions Affected:  1. Sensory/Pain  2. Neuromusculoskeletal  3. Movement Related Activities and Participation Affected:  1. Mobility  2. Interpersonal Interactions and Relationships  3. Community, Social and [place]   Number of elements (examined above) that affect the Plan of Care: 4+: HIGH COMPLEXITY   CLINICAL PRESENTATION:   Presentation: Stable and uncomplicated: LOW COMPLEXITY   CLINICAL DECISION MAKING:   Outcome Measure: Tool Used: Lower Extremity Functional Scale (LEFS)  Score:  Initial: 31/80 Most Recent: 60/80 (Date: 7-16-18 )   Interpretation of Score: 20 questions each scored on a 5 point scale with 0 representing \"extreme difficulty or unable to perform\" and 4 representing \"no difficulty\". The lower the score, the greater the functional disability. 80/80 represents no disability. Minimal detectable change is 9 points. Medical Necessity:   · Skilled intervention continues to be required due to increased R knee pain which limits the patient's mobility. Reason for Services/Other Comments:  · Patient continues to require skilled intervention due to increased R knee pain. Use of outcome tool(s) and clinical judgement create a POC that gives a: Clear prediction of patient's progress: LOW COMPLEXITY            TREATMENT:   (In addition to Assessment/Re-Assessment sessions the following treatments were rendered)  Pre-treatment Symptoms/Complaints:  Patient reports that her R leg is very sore after car trip from [place] last night. She is concerned it may be a circulation issue, but her R knee and R LE is sore.   Pain: Initial:  5/10 Post Session:  5/10 Modalities (0 minutes) not performed today  . Manual therapy (16 minutes) to reduce knee discomfort. Grade 3-4 patellofemoral joint mobilizations to faciliate patellar mobilty. Grade 3-4 R ankle joint mobilizations to improve R ankle mobility. Grade 3-4 physio mobilizations to R knee to reduce discomfort. Therapeutic Exercises ( 24 minutes) for reduced bilateral knee pain and improved bilateral LE strength per grid below. Stretching of R hamstrings in supine (5 x 20\") and R quadriceps in prone  (5 x 20\") to reduce discomfort and tightness.      Date:  6-19-18 Date:  6-25-18 Date:  6-27-18 Date  7-2-18 Date  7-5-18 Date  7-9-18 Date  7-11-18 Date  7-16-18 Date  7-18-18 Date  7-30-18 Date  8-6-18   Activity/Exercise Parameters Parameters Parameters           Straight leg raise 3 x 10 R 3 x 10 R 3 x 10 R 3 x 10 ea  3 x 8 ea LE         Side-lying hip ABD 3 x 5 R 3 x 6 R 2 x 8 R 2 x 8 ea 2 x 8 ea LE 2 x 8 ea LE   0# x 10, 1# 2 x 10 ea LE     Clamshells 1.5# 3 x 10 R Blue, 3 x 10 R Blue, 2 x 10 R Blue, 2 x 10 ea Blue, 3 x 10 ea Black, 3 x 10 ea Black 2 x 10 ea Black 3 x 10 ea Black, 3 x 10 ea Black 3 x 10 Black 3 x 10 ea   Bridging 2 x 10 bilateral 3 x 10 bilateral 3 x 10 bilateral 3 x 10 bilateral  2 x 10 bilateral 3 x 10 bilateral 3 x 10 bilat 3 x 10 bilateral 3 x 10 bilateral 3 x 10 bilateral   Slantboard calf stretch 3 x 30 \" bilateral 3 x 30\" bilateral 3 x 30\" bilateral 3 x 30\" bilateral 3 x 30\" bilateral 3 x 30\" bilateral 3 x 30\" bilaterla 3 x 30\" bilateral 3 x 30\" bilateral 3 x 30\" bilateral 3 x 30\" bilateral   Step ups 4\" 2 x 8 R  4\" 3 x 10 R  4\" 3 x 10 ea 4\" 3 x 10 ea 4\" x 10 ea  6# 2 x 10 ea 6\" 2  x10 ea    1 flight of stairs w/occasional use of single handrail 6\" 3 x 10 ea 8\" 2 x 10 ea 4\" 1 x 10   6\" 2 x 10 ea LE   Sit to stands   2 x 10    From weight bench, 3 x 10 From weight bench, 3 x 10  From standard chair 3 x 10 B    Shuttle press   75# x 10 bilateral  87.5# 2 x 10 bilateral       Shuttle press  75# x 10 B  100# 2 x 10 B    Squats      With TRX supports, 2 x 10    Shuttle press 75# 1 x 10, 100# 2 x 10 bilateral      Single limb stance       3 x 10\" on each LE 3 trials of single leg stance on each LE      Calf raises           2 x 15 bilateral         HEP: No changes today. Patient is to continue with existing HEP. Treatment/Session Assessment:    · Response to Treatment:  Patient limited by general soreness today following long car trip last night. R knee pain more limiting than at recent visits. Little increase in discomfort with active exercises today, but continues to be sore following PT.  · Compliance with Program/Exercises: Compliant. · Recommendations/Intent for next treatment session: \"Next visit will focus on reducing R knee pain, hip strenghtening. Continue strenghtening at next treatment. Reduce frequency to one visit per week in preparation of discharge to [facility] in next 3-4 weeks pending knee symptoms.   Total Treatment Duration: 40 Minutes  PT Patient Time In/Time Out  Time In: 1140  Time Out: 70[address], PT

## 2018-08-13 ENCOUNTER — HOSPITAL ENCOUNTER (OUTPATIENT)
Dept: PHYSICAL THERAPY | Age: 38
Discharge: HOME | End: 2018-08-13
Payer: COMMERCIAL

## 2018-08-13 PROCEDURE — 97140 MANUAL THERAPY 1/> REGIONS: CPT

## 2018-08-13 PROCEDURE — 97110 THERAPEUTIC EXERCISES: CPT

## 2018-08-13 NOTE — PROGRESS NOTES
Juan Alberts  : 1980  Payor: Tari Krueger / Plan: [facility] / Product Type: PPO /  [address]  at [facility] [address]  [address] [facility], [address] [place] [address] [address], [address] [facility]  [phone]   [phone]       OUTPATIENT PHYSICAL THERAPY:Daily Note 2018     ICD-10: Treatment Diagnosis: Pain in right knee (M25.561)  Precautions/Allergies:   Lisinopril; Lorabid [loracarbef]; and Lortab [hydrocodone-acetaminophen]   Fall Risk Score: 0 (? 5 = High Risk)  MD Orders: evaluate and treat MEDICAL/REFERRING DIAGNOSIS:  R knee pain   DATE OF ONSET: 2.5 weeks ago  REFERRING PHYSICIAN: Marlin Mo MD  RETURN PHYSICIAN APPOINTMENT: TBD     PROGRESS ASSESSMENT (18):  Pt presents with R knee pain. Pt demonstrates pain, slightly decreased ROM, and bilateral hip weakness which are limiting basic mobility and normal activity level. She will benefit from PT for guided rehab to promote normalized return of motion, strength, and function in order for safe return to routine activities without pain. PROBLEM LIST (Impacting functional limitations):  1. R knee pain  2. Decreased ROM R knee   3. Decreased functional strength R knee/LE   4. Decreased gait skills INTERVENTIONS PLANNED:    1. Thermal and electric modalities, manual therapies for pain. 2. Manual therapies and therapeutic exercises for ROM and strength. 3. Therapeutic exercises for gait and balance  4. Home exercise program   TREATMENT PLAN:  Effective Dates: 2018 TO 2018. Frequency/Duration: 2 visits per week for  8 weeks  GOALS: (Goals have been discussed and agreed upon with patient.)   Short-Term Functional Goals: Time Frame: 4 weeks  1. Report no more than minimal, intermittent 0-1/10 pain R knee with basic walking and ADL's, and score greater than 50/80 on the LEFS. MET 18  2.  R quad strength is 5/5 with good terminal extension strength and no pain for stability with walking and progressing into strengthening phase. MET per absence of quad lag with straight leg raises 7-16-18  3. Walking without limp on level surfaces and up stairs. MET 7-16-18  Discharge Goals: Time Frame: 8 weeks  1. No significant pain R knee with all normalized daily home and work activities, and score greater than 60/80 on the LEFS. MET 7-16-18  2. Demonstrate R knee ROM equal to the L, and no pain when ambulating on treadmill x 30 minutes. Ongoing, progressing 7-16-18  3. Able to balance with good control in single-leg stand greater than 15 seconds with eyes open, greater than 10 seconds with eyes closed for improved dynamic stability. Ongoing, progressing 7-16-18  4. Independent with [facility] for advanced knee strengthening. Rehabilitation Potential For Stated Goals: Good                                                                                The information in this section was collected on 6-14-18 (except where otherwise noted). HISTORY:   History of Present Injury/Illness (Reason for Referral):  Patient reports that she began experiencing a sharp pain in her R knee when ambulating for exercise on an inclined treadmill. Patient has had to cease her walking due to this knee pain and sought medical attention due to the increased pain when ambulating. Past Medical History/Comorbidities: Ms. Yang Alcala  has a past medical history of DVT of leg (deep venous thrombosis) ([address] Encounter for insertion of mirena IUD (01/03/2016); GERD (gastroesophageal reflux disease); H/O seasonal allergies; Hypertension; Hypertension; Irregular periods; Menstrual migraine without status migrainosus, not intractable (10/19/2015); Migraines; Morbid obesity ([address] Psychiatric disorder; Pulmonary embolus ([address] and Sleep disorder. She also has no past medical history of Aneurysm ([address] Arrhythmia; Arthritis; Asthma; Autoimmune disease ([address] CAD (coronary artery disease); Cancer ([address]  Chronic kidney disease; Chronic obstructive pulmonary disease ([facility] Chronic pain; Coagulation disorder ([address] Diabetes ([facility] Difficult intubation; Endocarditis; Heart failure ([facility] Liver disease; Malignant hyperthermia due to anesthesia; Nausea & vomiting; Nicotine vapor product user; Non-nicotine vapor product user; Pseudocholinesterase deficiency; PUD (peptic ulcer disease); Rheumatic fever; Seizures ([facility] Sleep apnea; Stroke [facility]); or Thyroid disease. Ms. Zuly Cr  has a past surgical history that includes hx cholecystectomy (2000); pr biopsy of breast, incisional; pr biopsy of breast, needle core; hx tonsillectomy (N/A); hx gyn (11/09/2017); and hx breast biopsy (Bilateral). Social History/Living Environment: Patient lives with family in a two-story apartment which is on the first floor. 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 She was exercising 30 minutes at [employer] prior to symptom onset. Current Medications:       Current Outpatient Prescriptions:     ibuprofen (MOTRIN) 800 mg tablet, Take 1 Tab by mouth every eight (8) hours as needed for Pain., Disp: 30 Tab, Rfl: 0    multivitamin (ONE A DAY) tablet, Take 1 Tab by mouth daily. Indications: VITAMIN DEFICIENCY PREVENTION, Disp: , Rfl:     OTHER, daily. Black Seed oil, Disp: , Rfl:        Date Last Reviewed:  8-13-18   Number of Personal Factors/Comorbidities that affect the Plan of Care: 1-2: MODERATE COMPLEXITY   EXAMINATION:   7-16-18  Observation/Orthostatic Postural Assessment: B genu valgus and bilateral pronation noted when standing and ambulating    Palpation: No increased tenderness to either knee. ROM:    Bilateral knee active ROM grossly equivalent                    Strength:   L hip abduction 5/5   R hip abduction 5/5      Special Tests: none performed  Neurological Screen: not tested  Functional Mobility:  Sit to/from Stand: independent.  Bed Mobility: independent  Walking on level ground: no antalgic gait pattern demonstrated when ambulating on level ground. Stair ascent/descent: reciprocal gait pattern utilized to to ascend and descend stairs with intermittent use of single handrail          Balance:  Single-leg stand: firm surface/eyes open L= 12 seconds, R= 15 seconds           Body Structures Involved:  1. Nerves  2. Bones  3. Joints  4. Muscles Body Functions Affected:  1. Sensory/Pain  2. Neuromusculoskeletal  3. Movement Related Activities and Participation Affected:  1. Mobility  2. Interpersonal Interactions and Relationships  3. Community, Social and [place]   Number of elements (examined above) that affect the Plan of Care: 4+: HIGH COMPLEXITY   CLINICAL PRESENTATION:   Presentation: Stable and uncomplicated: LOW COMPLEXITY   CLINICAL DECISION MAKING:   Outcome Measure: Tool Used: Lower Extremity Functional Scale (LEFS)  Score:  Initial: 31/80 Most Recent: 60/80 (Date: 7-16-18 )   Interpretation of Score: 20 questions each scored on a 5 point scale with 0 representing \"extreme difficulty or unable to perform\" and 4 representing \"no difficulty\". The lower the score, the greater the functional disability. 80/80 represents no disability. Minimal detectable change is 9 points. Medical Necessity:   · Skilled intervention continues to be required due to increased R knee pain which limits the patient's mobility. Reason for Services/Other Comments:  · Patient continues to require skilled intervention due to increased R knee pain. Use of outcome tool(s) and clinical judgement create a POC that gives a: Clear prediction of patient's progress: LOW COMPLEXITY            TREATMENT:   (In addition to Assessment/Re-Assessment sessions the following treatments were rendered)  Pre-treatment Symptoms/Complaints:  Patient reports very mild R knee pain today. Otherwise knee and legs are doing well. Has been walking more.   Pain: Initial:  2/10 Post Session:  2/10     Modalities (0 minutes) not performed today  .  Manual therapy (12 minutes) to reduce knee discomfort. Grade 3-4 patellofemoral joint mobilizations to faciliate patellar mobilty. Grade 3-4 R ankle joint mobilizations to improve B ankle mobility. Grade 3-4 physio mobilizations to each knee to reduce discomfort. Therapeutic Exercises ( 30 minutes) for reduced bilateral knee pain and improved bilateral LE strength per grid below. Stretching of bilateral hamstrings in supine (5 x 20\"), bilateral hip flexors (5 x 20\" ea) and bilateral quadriceps in prone  (5 x 20\") to reduce discomfort and tightness.      Date:  6-19-18 Date:  6-25-18 Date:  6-27-18 Date  7-2-18 Date  7-5-18 Date  7-9-18 Date  7-11-18 Date  7-16-18 Date  7-18-18 Date  7-30-18 Date  8-6-18 Date  8-13-18   Activity/Exercise Parameters Parameters Parameters            Straight leg raise 3 x 10 R 3 x 10 R 3 x 10 R 3 x 10 ea  3 x 8 ea LE          Side-lying hip ABD 3 x 5 R 3 x 6 R 2 x 8 R 2 x 8 ea 2 x 8 ea LE 2 x 8 ea LE   0# x 10, 1# 2 x 10 ea LE      Clamshells 1.5# 3 x 10 R Blue, 3 x 10 R Blue, 2 x 10 R Blue, 2 x 10 ea Blue, 3 x 10 ea Black, 3 x 10 ea Black 2 x 10 ea Black 3 x 10 ea Black, 3 x 10 ea Black 3 x 10 Black 3 x 10 ea Black 3 x 10 ea   Bridging 2 x 10 bilateral 3 x 10 bilateral 3 x 10 bilateral 3 x 10 bilateral  2 x 10 bilateral 3 x 10 bilateral 3 x 10 bilat 3 x 10 bilateral 3 x 10 bilateral 3 x 10 bilateral 3 x 10 bilateral   Slantboard calf stretch 3 x 30 \" bilateral 3 x 30\" bilateral 3 x 30\" bilateral 3 x 30\" bilateral 3 x 30\" bilateral 3 x 30\" bilateral 3 x 30\" bilaterla 3 x 30\" bilateral 3 x 30\" bilateral 3 x 30\" bilateral 3 x 30\" bilateral 3 x 30\" bilateral   Step ups 4\" 2 x 8 R  4\" 3 x 10 R  4\" 3 x 10 ea 4\" 3 x 10 ea 4\" x 10 ea  6# 2 x 10 ea 6\" 2  x10 ea    1 flight of stairs w/occasional use of single handrail 6\" 3 x 10 ea 8\" 2 x 10 ea 4\" 1 x 10   6\" 2 x 10 ea LE 6\" 2 x 10 ea LE   Sit to stands   2 x 10    From weight bench, 3 x 10 From weight bench, 3 x 10  From standard chair 3 x 10 B     Shuttle press   75# x 10 bilateral  87.5# 2 x 10 bilateral       Shuttle press  75# x 10 B  100# 2 x 10 B  Shuttle press 100# 3 x 10 bilateral   Squats      With TRX supports, 2 x 10    Shuttle press 75# 1 x 10, 100# 2 x 10 bilateral       Single limb stance       3 x 10\" on each LE 3 trials of single leg stance on each LE       Calf raises           2 x 15 bilateral          HEP: No changes today. Patient is to continue with existing HEP. Treatment/Session Assessment:    · Response to Treatment:  Patient shows good progress today with knee symptoms. Continues to have crepitus but pain symptoms have made good progress since last visit. · Compliance with Program/Exercises: Compliant. · Recommendations/Intent for next treatment session: \"Next visit likely to be patient's last - anticipate discharge to [facility].   Total Treatment Duration: 42 Minutes  PT Patient Time In/Time Out  Time In: 1647  Time Out: [place], PT

## 2018-08-20 ENCOUNTER — HOSPITAL ENCOUNTER (OUTPATIENT)
Dept: PHYSICAL THERAPY | Age: 38
Discharge: HOME | End: 2018-08-20
Payer: COMMERCIAL

## 2018-08-20 PROCEDURE — 97140 MANUAL THERAPY 1/> REGIONS: CPT

## 2018-08-20 PROCEDURE — 97110 THERAPEUTIC EXERCISES: CPT

## 2018-08-20 NOTE — PROGRESS NOTES
Pam Johnson  : 1980  Payor: Sy Mason / Plan: [facility] / Product Type: PPO /  [address]  at [facility] [address]  1101 [facility], [address] [place] [address], [facility] [address]  [phone]   [phone]       OUTPATIENT PHYSICAL THERAPY:Daily Note 2018     ICD-10: Treatment Diagnosis: Pain in right knee (M25.561)  Precautions/Allergies:   Lisinopril; Lorabid [loracarbef]; and Lortab [hydrocodone-acetaminophen]   Fall Risk Score: 0 (? 5 = High Risk)  MD Orders: evaluate and treat MEDICAL/REFERRING DIAGNOSIS:  R knee pain   DATE OF ONSET: 2.5 weeks ago  REFERRING PHYSICIAN: Leslie Mcnulty MD  RETURN PHYSICIAN APPOINTMENT: TBD     PROGRESS ASSESSMENT (18):  Pt presents with R knee pain. Pt demonstrates pain, slightly decreased ROM, and bilateral hip weakness which are limiting basic mobility and normal activity level. She will benefit from PT for guided rehab to promote normalized return of motion, strength, and function in order for safe return to routine activities without pain. PROBLEM LIST (Impacting functional limitations):  1. R knee pain  2. Decreased ROM R knee   3. Decreased functional strength R knee/LE   4. Decreased gait skills INTERVENTIONS PLANNED:    1. Thermal and electric modalities, manual therapies for pain. 2. Manual therapies and therapeutic exercises for ROM and strength. 3. Therapeutic exercises for gait and balance  4. Home exercise program   TREATMENT PLAN:  Effective Dates: 2018 TO 2018. Frequency/Duration: 2 visits per week for  8 weeks  GOALS: (Goals have been discussed and agreed upon with patient.)   Short-Term Functional Goals: Time Frame: 4 weeks  1. Report no more than minimal, intermittent 0-1/10 pain R knee with basic walking and ADL's, and score greater than 50/80 on the LEFS. MET 18  2.  R quad strength is 5/5 with good terminal extension strength and no pain for stability with walking and progressing into strengthening phase. MET per absence of quad lag with straight leg raises 7-16-18  3. Walking without limp on level surfaces and up stairs. MET 7-16-18  Discharge Goals: Time Frame: 8 weeks  1. No significant pain R knee with all normalized daily home and work activities, and score greater than 60/80 on the LEFS. MET 7-16-18  2. Demonstrate R knee ROM equal to the L, and no pain when ambulating on treadmill x 30 minutes. Ongoing, progressing 7-16-18  3. Able to balance with good control in single-leg stand greater than 15 seconds with eyes open, greater than 10 seconds with eyes closed for improved dynamic stability. Ongoing, progressing 7-16-18  4. Independent with [facility] for advanced knee strengthening. Rehabilitation Potential For Stated Goals: Good                                                                                The information in this section was collected on 6-14-18 (except where otherwise noted). HISTORY:   History of Present Injury/Illness (Reason for Referral):  Patient reports that she began experiencing a sharp pain in her R knee when ambulating for exercise on an inclined treadmill. Patient has had to cease her walking due to this knee pain and sought medical attention due to the increased pain when ambulating. Past Medical History/Comorbidities: Ms. Sher Santana  has a past medical history of DVT of leg (deep venous thrombosis) ([address] Encounter for insertion of mirena IUD (01/03/2016); GERD (gastroesophageal reflux disease); H/O seasonal allergies; Hypertension; Hypertension; Irregular periods; Menstrual migraine without status migrainosus, not intractable (10/19/2015); Migraines; Morbid obesity ([address] Psychiatric disorder; Pulmonary embolus ([address] and Sleep disorder. She also has no past medical history of Aneurysm ([address] Arrhythmia; Arthritis; Asthma; Autoimmune disease ([address] CAD (coronary artery disease); Cancer ([address]  Chronic kidney disease; Chronic obstructive pulmonary disease ([facility] Chronic pain; Coagulation disorder ([address] Diabetes ([facility] Difficult intubation; Endocarditis; Heart failure ([facility] Liver disease; Malignant hyperthermia due to anesthesia; Nausea & vomiting; Nicotine vapor product user; Non-nicotine vapor product user; Pseudocholinesterase deficiency; PUD (peptic ulcer disease); Rheumatic fever; Seizures ([facility] Sleep apnea; Stroke [facility]); or Thyroid disease. Ms. Lior Snowden  has a past surgical history that includes hx cholecystectomy (2000); pr biopsy of breast, incisional; pr biopsy of breast, needle core; hx tonsillectomy (N/A); hx gyn (11/09/2017); and hx breast biopsy (Bilateral). Social History/Living Environment: Patient lives with family in a two-story apartment which is on the first floor. 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 She was exercising 30 minutes at [employer] prior to symptom onset. Current Medications:       Current Outpatient Prescriptions:     ibuprofen (MOTRIN) 800 mg tablet, Take 1 Tab by mouth every eight (8) hours as needed for Pain., Disp: 30 Tab, Rfl: 0    multivitamin (ONE A DAY) tablet, Take 1 Tab by mouth daily. Indications: VITAMIN DEFICIENCY PREVENTION, Disp: , Rfl:     OTHER, daily. Black Seed oil, Disp: , Rfl:        Date Last Reviewed:  8-13-18   Number of Personal Factors/Comorbidities that affect the Plan of Care: 1-2: MODERATE COMPLEXITY   EXAMINATION:   7-16-18  Observation/Orthostatic Postural Assessment: B genu valgus and bilateral pronation noted when standing and ambulating    Palpation: No increased tenderness to either knee. ROM:    Bilateral knee active ROM grossly equivalent                    Strength:   L hip abduction 5/5   R hip abduction 5/5      Special Tests: none performed  Neurological Screen: not tested  Functional Mobility:  Sit to/from Stand: independent.  Bed Mobility: independent  Walking on level ground: no antalgic gait pattern demonstrated when ambulating on level ground. Stair ascent/descent: reciprocal gait pattern utilized to to ascend and descend stairs with intermittent use of single handrail          Balance:  Single-leg stand: firm surface/eyes open L= 12 seconds, R= 15 seconds           Body Structures Involved:  1. Nerves  2. Bones  3. Joints  4. Muscles Body Functions Affected:  1. Sensory/Pain  2. Neuromusculoskeletal  3. Movement Related Activities and Participation Affected:  1. Mobility  2. Interpersonal Interactions and Relationships  3. Community, Social and [place]   Number of elements (examined above) that affect the Plan of Care: 4+: HIGH COMPLEXITY   CLINICAL PRESENTATION:   Presentation: Stable and uncomplicated: LOW COMPLEXITY   CLINICAL DECISION MAKING:   Outcome Measure: Tool Used: Lower Extremity Functional Scale (LEFS)  Score:  Initial: 31/80 Most Recent: 60/80 (Date: 7-16-18 )   Interpretation of Score: 20 questions each scored on a 5 point scale with 0 representing \"extreme difficulty or unable to perform\" and 4 representing \"no difficulty\". The lower the score, the greater the functional disability. 80/80 represents no disability. Minimal detectable change is 9 points. Medical Necessity:   · Skilled intervention continues to be required due to increased R knee pain which limits the patient's mobility. Reason for Services/Other Comments:  · Patient continues to require skilled intervention due to increased R knee pain. Use of outcome tool(s) and clinical judgement create a POC that gives a: Clear prediction of patient's progress: LOW COMPLEXITY            TREATMENT:   (In addition to Assessment/Re-Assessment sessions the following treatments were rendered)  Pre-treatment Symptoms/Complaints:  Patient reports that she walked at [employer] [facility] last Tuesday, and her R knee was sore afterward. She has had some soreness ever since and has not been as active as she had hoped.   Pain: Initial:  2-3/10 Post Session:  2/10     Modalities (0 minutes) not performed today  . Manual therapy (20 minutes) to reduce knee discomfort. Grade 3-4 patellofemoral joint mobilizations to R knee to faciliate patellar mobilty. Grade 3-4 R ankle joint mobilizations to improve B ankle mobility. Grade 3-4 physiologic and accessory joint mobilizations to R knee to reduce discomfort    Therapeutic Exercises ( 15 minutes) for reduced bilateral knee pain and improved bilateral LE strength per grid below. Stretching of bilateral hamstrings in supine (5 x 20\"), and bilateral quadriceps in prone  (5 x 20\") to reduce discomfort and tightness.      Date:  6-19-18 Date:  6-25-18 Date:  6-27-18 Date  7-2-18 Date  7-5-18 Date  7-9-18 Date  7-11-18 Date  7-16-18 Date  7-18-18 Date  7-30-18 Date  8-6-18 Date  8-13-18 Date  8-20-18   Activity/Exercise Parameters Parameters Parameters             Straight leg raise 3 x 10 R 3 x 10 R 3 x 10 R 3 x 10 ea  3 x 8 ea LE        3 x 10 R   Side-lying hip ABD 3 x 5 R 3 x 6 R 2 x 8 R 2 x 8 ea 2 x 8 ea LE 2 x 8 ea LE   0# x 10, 1# 2 x 10 ea LE       Clamshells 1.5# 3 x 10 R Blue, 3 x 10 R Blue, 2 x 10 R Blue, 2 x 10 ea Blue, 3 x 10 ea Black, 3 x 10 ea Black 2 x 10 ea Black 3 x 10 ea Black, 3 x 10 ea Black 3 x 10 Black 3 x 10 ea Black 3 x 10 ea Black 3 x 10 ea   Bridging 2 x 10 bilateral 3 x 10 bilateral 3 x 10 bilateral 3 x 10 bilateral  2 x 10 bilateral 3 x 10 bilateral 3 x 10 bilat 3 x 10 bilateral 3 x 10 bilateral 3 x 10 bilateral 3 x 10 bilateral 3 x 10 bilateral   Slantboard calf stretch 3 x 30 \" bilateral 3 x 30\" bilateral 3 x 30\" bilateral 3 x 30\" bilateral 3 x 30\" bilateral 3 x 30\" bilateral 3 x 30\" bilaterla 3 x 30\" bilateral 3 x 30\" bilateral 3 x 30\" bilateral 3 x 30\" bilateral 3 x 30\" bilateral 3 x 30\" bilaeral   Step ups 4\" 2 x 8 R  4\" 3 x 10 R  4\" 3 x 10 ea 4\" 3 x 10 ea 4\" x 10 ea  6# 2 x 10 ea 6\" 2  x10 ea    1 flight of stairs w/occasional use of single handrail 6\" 3 x 10 ea 8\" 2 x 10 ea 4\" 1 x 10   6\" 2 x 10 ea LE 6\" 2 x 10 ea LE    Sit to stands   2 x 10    From weight bench, 3 x 10 From weight bench, 3 x 10  From standard chair 3 x 10 B      Shuttle press   75# x 10 bilateral  87.5# 2 x 10 bilateral       Shuttle press  75# x 10 B  100# 2 x 10 B  Shuttle press 100# 3 x 10 bilateral    Squats      With TRX supports, 2 x 10    Shuttle press 75# 1 x 10, 100# 2 x 10 bilateral        Single limb stance       3 x 10\" on each LE 3 trials of single leg stance on each LE        Calf raises           2 x 15 bilateral           HEP: No changes today. Patient is to continue with existing HEP. Treatment/Session Assessment:    · Response to Treatment:  Patient to continue with PT due to ongoing R knee pain after activity. Patient needs continued hip strengthening to limit R knee pain. · Compliance with Program/Exercises: Compliant. · Recommendations/Intent for next treatment session: \"Next visit will continue to increase patient's activity level while monitoring R knee symptoms.    Total Treatment Duration: 35 Minutes  PT Patient Time In/Time Out  Time In: 1620  Time Out: [address], PT

## 2018-08-21 NOTE — THERAPY RECERTIFICATION
Edward Inch  : 1980  Payor: Teresita Das / Plan: [facility] / Product Type: PPO /  Waleska Caraballo at [facility] PRISCA TREJO  [address] [facility], [address] [place] [address],[address], [facility] [address]  [phone]   [phone]       OUTPATIENT PHYSICAL [address]      ICD-10: Treatment Diagnosis: Pain in right knee (M25.561)  Precautions/Allergies:   Lisinopril; Lorabid [loracarbef]; and Lortab [hydrocodone-acetaminophen]   Fall Risk Score: 0 (? 5 = High Risk)  MD Orders: evaluate and treat MEDICAL/REFERRING DIAGNOSIS:  R knee pain   DATE OF ONSET: 2.5 weeks ago (from 18)  REFERRING PHYSICIAN: Jesus Alberto Mendes MD  RETURN PHYSICIAN APPOINTMENT: TBD     RE-CERTIFICATION ASSESSMENT (18):  Pt has attended 14 PT appointments, and while she has made good progress with her symptoms (enough to move into one visit per week), she continues to have some nagging R knee pain after increased activity level. Patient was scheduled for discharge to [facility] on 18, but due to symptom re-aggravation, will continue PT once per week with possiblility of increasing to twice per week if needed, to reduce R knee pain. Patient will likely continue to benefit from ongoing PT to strengthen patient's hip abductors, improve flexibility in hamstrings and hip flexors, and reduce strain across R knee to facilitate increased activity level without increased discomfort. PROBLEM LIST (Impacting functional limitations):  1. R knee pain  2. Decreased ROM R knee   3. Decreased functional strength R knee/LE   4. Decreased gait skills INTERVENTIONS PLANNED:    1. Thermal and electric modalities, manual therapies for pain. 2. Manual therapies and therapeutic exercises for ROM and strength. 3. Therapeutic exercises for gait and balance  4. Home exercise program   TREATMENT PLAN:  Effective Dates: 2018 TO 10/4/2018.  Frequency/Duration: 1-2 visits per week for  8 weeks  GOALS: (Goals have been discussed and agreed upon with patient.)   Short-Term Functional Goals: Time Frame: 4 weeks  1. Report no more than minimal, intermittent 0-1/10 pain R knee with basic walking and ADL's, and score greater than 50/80 on the LEFS. MET 7-16-18  2. R quad strength is 5/5 with good terminal extension strength and no pain for stability with walking and progressing into strengthening phase. MET per absence of quad lag with straight leg raises 8-20-18  3. Walking without limp on level surfaces and up stairs. MET 7-16-18  Discharge Goals: Time Frame: 8 weeks  1. No significant pain R knee with all normalized daily home and work activities, and score greater than 60/80 on the LEFS. MET 7-16-18  2. Demonstrate R knee ROM equal to the L, and no pain when ambulating on treadmill x 30 minutes. Ongoing, progressing 8-20-18 per observation  3. Able to balance with good control in single-leg stand greater than 15 seconds with eyes open, greater than 10 seconds with eyes closed for improved dynamic stability. Not tested 8-20-18  4. Independent with [facility] for advanced knee strengthening. Rehabilitation Potential For Stated Goals: Good    Regarding Pandadeonte Medina's therapy, I certify that the treatment plan above will be carried out by a therapist or under their direction. Thank you for this referral,      Manav Wiggins PT       Referring Physician Signature:     Jaswinder Blake MD          Date                                                                                          The information in this section was collected on 6-14-18 (except where otherwise noted). HISTORY:   History of Present Injury/Illness (Reason for Referral):  Patient reports that she began experiencing a sharp pain in her R knee when ambulating for exercise on an inclined treadmill. Patient has had to cease her walking due to this knee pain and sought medical attention due to the increased pain when ambulating.    Past Medical History/Comorbidities: Ms. Paige Cabello  has a past medical history of DVT of leg (deep venous thrombosis) ([address] Encounter for insertion of mirena IUD (01/03/2016); GERD (gastroesophageal reflux disease); H/O seasonal allergies; Hypertension; Hypertension; Irregular periods; Menstrual migraine without status migrainosus, not intractable (10/19/2015); Migraines; Morbid obesity ([address] Psychiatric disorder; Pulmonary embolus ([address] and Sleep disorder. She also has no past medical history of Aneurysm ([address] Arrhythmia; Arthritis; Asthma; Autoimmune disease ([address] CAD (coronary artery disease); Cancer ([address] Chronic kidney disease; Chronic obstructive pulmonary disease ([address] Chronic pain; Coagulation disorder ([address] Diabetes ([address] Difficult intubation; Endocarditis; Heart failure ([address] Liver disease; Malignant hyperthermia due to anesthesia; Nausea & vomiting; Nicotine vapor product user; Non-nicotine vapor product user; Pseudocholinesterase deficiency; PUD (peptic ulcer disease); Rheumatic fever; Seizures ([address] Sleep apnea; Stroke [facility]); or Thyroid disease. Ms. Paige Cabello  has a past surgical history that includes hx cholecystectomy (2000); pr biopsy of breast, incisional; pr biopsy of breast, needle core; hx tonsillectomy (N/A); hx gyn (11/09/2017); and hx breast biopsy (Bilateral). Social History/Living Environment: Patient lives with family in a two-story apartment which is on the first floor. 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 She was exercising 30 minutes at [employer] prior to symptom onset. Current Medications:       Current Outpatient Prescriptions:     ibuprofen (MOTRIN) 800 mg tablet, Take 1 Tab by mouth every eight (8) hours as needed for Pain., Disp: 30 Tab, Rfl: 0    multivitamin (ONE A DAY) tablet, Take 1 Tab by mouth daily. Indications: VITAMIN DEFICIENCY PREVENTION, Disp: , Rfl:     OTHER, daily.  Black Seed oil, Disp: , Rfl:        Date Last Reviewed:  8-13-18   Number of Personal Factors/Comorbidities that affect the Plan of Care: 1-2: MODERATE COMPLEXITY   EXAMINATION:   7-16-18 (last progress repot)  Observation/Orthostatic Postural Assessment: B genu valgus and bilateral pronation noted when standing and ambulating    Palpation: No increased tenderness to either knee. ROM:    Bilateral knee active ROM grossly equivalent                    Strength:   L hip abduction 5/5   R hip abduction 5/5      Special Tests: none performed  Neurological Screen: not tested  Functional Mobility:  Sit to/from Stand: independent. Bed Mobility: independent  Walking on level ground: no antalgic gait pattern demonstrated when ambulating on level ground. Stair ascent/descent: reciprocal gait pattern utilized to to ascend and descend stairs with intermittent use of single handrail          Balance:  Single-leg stand: firm surface/eyes open L= 12 seconds, R= 15 seconds           Body Structures Involved:  1. Nerves  2. Bones  3. Joints  4. Muscles Body Functions Affected:  1. Sensory/Pain  2. Neuromusculoskeletal  3. Movement Related Activities and Participation Affected:  1. Mobility  2. Interpersonal Interactions and Relationships  3. Community, Social and [place]   Number of elements (examined above) that affect the Plan of Care: 4+: HIGH COMPLEXITY   CLINICAL PRESENTATION:   Presentation: Stable and uncomplicated: LOW COMPLEXITY   CLINICAL DECISION MAKING:   Outcome Measure: Tool Used: Lower Extremity Functional Scale (LEFS)  Score:  Initial: 31/80 Most Recent: 60/80 (Date: 7-16-18 )   Interpretation of Score: 20 questions each scored on a 5 point scale with 0 representing \"extreme difficulty or unable to perform\" and 4 representing \"no difficulty\". The lower the score, the greater the functional disability. 80/80 represents no disability. Minimal detectable change is 9 points.     Medical Necessity:   · Skilled intervention continues to be required due to increased R knee pain which limits the patient's mobility. Reason for Services/Other Comments:  · Patient continues to require skilled intervention due to increased R knee pain.    Use of outcome tool(s) and clinical judgement create a POC that gives a: Clear prediction of patient's progress: LOW COMPLEXITY

## 2018-08-29 ENCOUNTER — APPOINTMENT (OUTPATIENT)
Dept: PHYSICAL THERAPY | Age: 38
End: 2018-08-29
Payer: COMMERCIAL

## 2018-09-05 ENCOUNTER — HOSPITAL ENCOUNTER (OUTPATIENT)
Dept: PHYSICAL THERAPY | Age: 38
Discharge: HOME | End: 2018-09-05
Payer: COMMERCIAL

## 2018-09-05 PROCEDURE — 97140 MANUAL THERAPY 1/> REGIONS: CPT

## 2018-09-05 PROCEDURE — 97110 THERAPEUTIC EXERCISES: CPT

## 2018-09-05 NOTE — PROGRESS NOTES
Shaheen Leigh  : 1980  Payor: Elena Ackerman / Plan: [place] BLUE CROSS OF [address] / Product Type: PPO /  [address]  at [address] [place] [address] Rd  [address] [place] [address],[address], [facility].  [phone]   [phone]       OUTPATIENT PHYSICAL THERAPY:Daily Note 2018     ICD-10: Treatment Diagnosis: Pain in right knee (M25.561)  Precautions/Allergies:   Lisinopril; Lorabid [loracarbef]; and Lortab [hydrocodone-acetaminophen]   Fall Risk Score: 0 (? 5 = High Risk)  MD Orders: evaluate and treat MEDICAL/REFERRING DIAGNOSIS:  R knee pain   DATE OF ONSET: 2.5 weeks ago  REFERRING PHYSICIAN: Sondra Jewell MD  RETURN PHYSICIAN APPOINTMENT: TBD     PROGRESS ASSESSMENT (18):  Pt presents with R knee pain. Pt demonstrates pain, slightly decreased ROM, and bilateral hip weakness which are limiting basic mobility and normal activity level. She will benefit from PT for guided rehab to promote normalized return of motion, strength, and function in order for safe return to routine activities without pain. PROBLEM LIST (Impacting functional limitations):  1. R knee pain  2. Decreased ROM R knee   3. Decreased functional strength R knee/LE   4. Decreased gait skills INTERVENTIONS PLANNED:    1. Thermal and electric modalities, manual therapies for pain. 2. Manual therapies and therapeutic exercises for ROM and strength. 3. Therapeutic exercises for gait and balance  4. Home exercise program   TREATMENT PLAN:  Effective Dates: 2018 TO 2018. Frequency/Duration: 2 visits per week for  8 weeks  GOALS: (Goals have been discussed and agreed upon with patient.)   Short-Term Functional Goals: Time Frame: 4 weeks  1. Report no more than minimal, intermittent 0-1/10 pain R knee with basic walking and ADL's, and score greater than 50/80 on the LEFS. MET 18  2.  R quad strength is 5/5 with good terminal extension strength and no pain for stability with walking and progressing into strengthening phase. MET per absence of quad lag with straight leg raises 7-16-18  3. Walking without limp on level surfaces and up stairs. MET 7-16-18  Discharge Goals: Time Frame: 8 weeks  1. No significant pain R knee with all normalized daily home and work activities, and score greater than 60/80 on the LEFS. MET 7-16-18  2. Demonstrate R knee ROM equal to the L, and no pain when ambulating on treadmill x 30 minutes. Ongoing, progressing 7-16-18  3. Able to balance with good control in single-leg stand greater than 15 seconds with eyes open, greater than 10 seconds with eyes closed for improved dynamic stability. Ongoing, progressing 7-16-18  4. Independent with [facility] for advanced knee strengthening. Rehabilitation Potential For Stated Goals: Good                                                                                The information in this section was collected on 6-14-18 (except where otherwise noted). HISTORY:   History of Present Injury/Illness (Reason for Referral):  Patient reports that she began experiencing a sharp pain in her R knee when ambulating for exercise on an inclined treadmill. Patient has had to cease her walking due to this knee pain and sought medical attention due to the increased pain when ambulating. Past Medical History/Comorbidities: Ms. Oneal Gary  has a past medical history of DVT of leg (deep venous thrombosis) ([address] Encounter for insertion of mirena IUD (01/03/2016); GERD (gastroesophageal reflux disease); H/O seasonal allergies; Hypertension; Hypertension; Irregular periods; Menstrual migraine without status migrainosus, not intractable (10/19/2015); Migraines; Morbid obesity ([address] Psychiatric disorder; Pulmonary embolus ([address] and Sleep disorder. She also has no past medical history of Aneurysm ([address] Arrhythmia; Arthritis; Asthma; Autoimmune disease ([address] CAD (coronary artery disease); Cancer ([address]  Chronic kidney disease; Chronic obstructive pulmonary disease ([address] Chronic pain; Coagulation disorder ([address] Diabetes ([address] Difficult intubation; Endocarditis; Heart failure ([address] Liver disease; Malignant hyperthermia due to anesthesia; Nausea & vomiting; Nicotine vapor product user; Non-nicotine vapor product user; Pseudocholinesterase deficiency; PUD (peptic ulcer disease); Rheumatic fever; Seizures ([address] Sleep apnea; Stroke [facility]); or Thyroid disease. Ms. Jann Dupree  has a past surgical history that includes hx cholecystectomy (2000); pr biopsy of breast, incisional; pr biopsy of breast, needle core; hx tonsillectomy (N/A); hx gyn (11/09/2017); and hx breast biopsy (Bilateral). Social History/Living Environment: Patient lives with family in a two-story apartment which is on the first floor. 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 She was exercising 30 minutes at [employer] prior to symptom onset. Current Medications:       Current Outpatient Prescriptions:     ibuprofen (MOTRIN) 800 mg tablet, Take 1 Tab by mouth every eight (8) hours as needed for Pain., Disp: 30 Tab, Rfl: 0    multivitamin (ONE A DAY) tablet, Take 1 Tab by mouth daily. Indications: VITAMIN DEFICIENCY PREVENTION, Disp: , Rfl:     OTHER, daily. Black Seed oil, Disp: , Rfl:        Date Last Reviewed:  8-13-18   Number of Personal Factors/Comorbidities that affect the Plan of Care: 1-2: MODERATE COMPLEXITY   EXAMINATION:   7-16-18  Observation/Orthostatic Postural Assessment: B genu valgus and bilateral pronation noted when standing and ambulating    Palpation: No increased tenderness to either knee. ROM:    Bilateral knee active ROM grossly equivalent                    Strength:   L hip abduction 5/5   R hip abduction 5/5      Special Tests: none performed  Neurological Screen: not tested  Functional Mobility:  Sit to/from Stand: independent.  Bed Mobility: independent  Walking on level ground: no antalgic gait pattern demonstrated when ambulating on level ground. Stair ascent/descent: reciprocal gait pattern utilized to to ascend and descend stairs with intermittent use of single handrail          Balance:  Single-leg stand: firm surface/eyes open L= 12 seconds, R= 15 seconds           Body Structures Involved:  1. Nerves  2. Bones  3. Joints  4. Muscles Body Functions Affected:  1. Sensory/Pain  2. Neuromusculoskeletal  3. Movement Related Activities and Participation Affected:  1. Mobility  2. Interpersonal Interactions and Relationships  3. Community, Social and [place]   Number of elements (examined above) that affect the Plan of Care: 4+: HIGH COMPLEXITY   CLINICAL PRESENTATION:   Presentation: Stable and uncomplicated: LOW COMPLEXITY   CLINICAL DECISION MAKING:   Outcome Measure: Tool Used: Lower Extremity Functional Scale (LEFS)  Score:  Initial: 31/80 Most Recent: 60/80 (Date: 7-16-18 )   Interpretation of Score: 20 questions each scored on a 5 point scale with 0 representing \"extreme difficulty or unable to perform\" and 4 representing \"no difficulty\". The lower the score, the greater the functional disability. 80/80 represents no disability. Minimal detectable change is 9 points. Medical Necessity:   · Skilled intervention continues to be required due to increased R knee pain which limits the patient's mobility. Reason for Services/Other Comments:  · Patient continues to require skilled intervention due to increased R knee pain. Use of outcome tool(s) and clinical judgement create a POC that gives a: Clear prediction of patient's progress: LOW COMPLEXITY            TREATMENT:   (In addition to Assessment/Re-Assessment sessions the following treatments were rendered)  Pre-treatment Symptoms/Complaints:  Patient reports that she has returned to the gym, and does not have much pain while walking on treadmill, but has 7/10 pain later that night.   Pain: Initial:  None reported upon arrival today Post Session: 2/10     Modalities (0 minutes) not performed today  . Manual therapy (10 minutes) to reduce R knee pain. Grade 2-3 physio mobilizations to R knee. Grade 2-3 patellofemoral joint mobilizations to reduce R knee pain. Grade 3-4 ankle joint mobilizations to reduce R knee discomfort and improve ankle mobility. Therapeutic Exercises ( 30 minutes) for reduced bilateral knee pain and improved bilateral LE strength per grid below. Stretching of bilateral hamstrings in supine (5 x 20\"), and bilateral quadriceps in prone  (5 x 20\") to reduce discomfort and tightness.      Date:  6-19-18 Date:  6-25-18 Date:  6-27-18 Date  7-2-18 Date  7-5-18 Date  7-9-18 Date  7-11-18 Date  7-16-18 Date  7-18-18 Date  7-30-18 Date  8-6-18 Date  8-13-18 Date  8-20-18 Date  9-5-18   Activity/Exercise Parameters Parameters Parameters              Straight leg raise 3 x 10 R 3 x 10 R 3 x 10 R 3 x 10 ea  3 x 8 ea LE        3 x 10 R    Side-lying hip ABD 3 x 5 R 3 x 6 R 2 x 8 R 2 x 8 ea 2 x 8 ea LE 2 x 8 ea LE   0# x 10, 1# 2 x 10 ea LE     3 x 10 R   Clamshells 1.5# 3 x 10 R Blue, 3 x 10 R Blue, 2 x 10 R Blue, 2 x 10 ea Blue, 3 x 10 ea Black, 3 x 10 ea Black 2 x 10 ea Black 3 x 10 ea Black, 3 x 10 ea Black 3 x 10 Black 3 x 10 ea Black 3 x 10 ea Black 3 x 10 ea    Bridging 2 x 10 bilateral 3 x 10 bilateral 3 x 10 bilateral 3 x 10 bilateral  2 x 10 bilateral 3 x 10 bilateral 3 x 10 bilat 3 x 10 bilateral 3 x 10 bilateral 3 x 10 bilateral 3 x 10 bilateral 3 x 10 bilateral 3 x 10 bilateral   Slantboard calf stretch 3 x 30 \" bilateral 3 x 30\" bilateral 3 x 30\" bilateral 3 x 30\" bilateral 3 x 30\" bilateral 3 x 30\" bilateral 3 x 30\" bilaterla 3 x 30\" bilateral 3 x 30\" bilateral 3 x 30\" bilateral 3 x 30\" bilateral 3 x 30\" bilateral 3 x 30\" bilaeral 3 x 30\" bilateral   Step ups 4\" 2 x 8 R  4\" 3 x 10 R  4\" 3 x 10 ea 4\" 3 x 10 ea 4\" x 10 ea  6# 2 x 10 ea 6\" 2  x10 ea    1 flight of stairs w/occasional use of single handrail 6\" 3 x 10 ea 8\" 2 x 10 ea 4\" 1 x 10   6\" 2 x 10 ea LE 6\" 2 x 10 ea LE  4\" 2 x 10 ea LE   Sit to stands   2 x 10    From weight bench, 3 x 10 From weight bench, 3 x 10  From standard chair 3 x 10 B       Shuttle press   75# x 10 bilateral  87.5# 2 x 10 bilateral       Shuttle press  75# x 10 B  100# 2 x 10 B  Shuttle press 100# 3 x 10 bilateral     Squats      With TRX supports, 2 x 10    Shuttle press 75# 1 x 10, 100# 2 x 10 bilateral         Single limb stance       3 x 10\" on each LE 3 trials of single leg stance on each LE         Calf raises           2 x 15 bilateral      Trunk stability              posterior pelvic tilt (PPT) 2 x 10    PPT + hip ABD (blue) 2 x 10    PPT + hip ADD squeeze 2 x 10         HEP: No changes today. Patient is to continue with existing HEP. Treatment/Session Assessment:    · Response to Treatment:  Patient continues to be limited when exercising by R knee pain. Demonstrates improved hamstring flexibility. Had difficulty with trunk stabilization exercises initially, and demonstrates some pelvic aberrant movements when performing step ups. · Compliance with Program/Exercises: Compliant. · Recommendations/Intent for next treatment session: \"Next visit will continue to increase patient's activity level while monitoring R knee symptoms.    Total Treatment Duration: 40 Minutes  PT Patient Time In/Time Out  Time In: 1700  Time Out: [address], PT

## 2018-09-10 ENCOUNTER — HOSPITAL ENCOUNTER (OUTPATIENT)
Dept: PHYSICAL THERAPY | Age: 38
Discharge: HOME | End: 2018-09-10
Payer: COMMERCIAL

## 2018-09-10 PROCEDURE — 97110 THERAPEUTIC EXERCISES: CPT

## 2018-09-10 NOTE — PROGRESS NOTES
Morales Reynolds  : 1980  Payor: Maile Hernandez / Plan: [facility] / Product Type: PPO /  [address]  at [facility] [address]  [address] [facility], [address] [place] [address],[address], [facility] [address]  [phone]   [phone]       OUTPATIENT PHYSICAL THERAPY:Daily Note 9/10/2018     ICD-10: Treatment Diagnosis: Pain in right knee (M25.561)  Precautions/Allergies:   Lisinopril; Lorabid [loracarbef]; and Lortab [hydrocodone-acetaminophen]   Fall Risk Score: 0 (? 5 = High Risk)  MD Orders: evaluate and treat MEDICAL/REFERRING DIAGNOSIS:  R knee pain   DATE OF ONSET: 2.5 weeks ago  REFERRING PHYSICIAN: Marion Baltazar MD  RETURN PHYSICIAN APPOINTMENT: TBD     PROGRESS ASSESSMENT (18):  Pt presents with R knee pain. Pt demonstrates pain, slightly decreased ROM, and bilateral hip weakness which are limiting basic mobility and normal activity level. She will benefit from PT for guided rehab to promote normalized return of motion, strength, and function in order for safe return to routine activities without pain. PROBLEM LIST (Impacting functional limitations):  1. R knee pain  2. Decreased ROM R knee   3. Decreased functional strength R knee/LE   4. Decreased gait skills INTERVENTIONS PLANNED:    1. Thermal and electric modalities, manual therapies for pain. 2. Manual therapies and therapeutic exercises for ROM and strength. 3. Therapeutic exercises for gait and balance  4. Home exercise program   TREATMENT PLAN:  Effective Dates: 2018 TO 2018. Frequency/Duration: 2 visits per week for  8 weeks  GOALS: (Goals have been discussed and agreed upon with patient.)   Short-Term Functional Goals: Time Frame: 4 weeks  1. Report no more than minimal, intermittent 0-1/10 pain R knee with basic walking and ADL's, and score greater than 50/80 on the LEFS. MET 18  2.  R quad strength is 5/5 with good terminal extension strength and no pain for stability with walking and progressing into strengthening phase. MET per absence of quad lag with straight leg raises 7-16-18  3. Walking without limp on level surfaces and up stairs. MET 7-16-18  Discharge Goals: Time Frame: 8 weeks  1. No significant pain R knee with all normalized daily home and work activities, and score greater than 60/80 on the LEFS. MET 7-16-18  2. Demonstrate R knee ROM equal to the L, and no pain when ambulating on treadmill x 30 minutes. Ongoing, progressing 7-16-18  3. Able to balance with good control in single-leg stand greater than 15 seconds with eyes open, greater than 10 seconds with eyes closed for improved dynamic stability. Ongoing, progressing 7-16-18  4. Independent with [facility] for advanced knee strengthening. Rehabilitation Potential For Stated Goals: Good                                                                                The information in this section was collected on 6-14-18 (except where otherwise noted). HISTORY:   History of Present Injury/Illness (Reason for Referral):  Patient reports that she began experiencing a sharp pain in her R knee when ambulating for exercise on an inclined treadmill. Patient has had to cease her walking due to this knee pain and sought medical attention due to the increased pain when ambulating. Past Medical History/Comorbidities: Ms. Eileen Evans  has a past medical history of DVT of leg (deep venous thrombosis) ([address] Encounter for insertion of mirena IUD (01/03/2016); GERD (gastroesophageal reflux disease); H/O seasonal allergies; Hypertension; Hypertension; Irregular periods; Menstrual migraine without status migrainosus, not intractable (10/19/2015); Migraines; Morbid obesity ([address] Psychiatric disorder; Pulmonary embolus ([address] and Sleep disorder. She also has no past medical history of Aneurysm ([address] Arrhythmia; Arthritis; Asthma; Autoimmune disease ([address] CAD (coronary artery disease); Cancer ([address]  Chronic kidney disease; Chronic obstructive pulmonary disease ([address] Chronic pain; Coagulation disorder ([address] Diabetes ([address] Difficult intubation; Endocarditis; Heart failure ([address] Liver disease; Malignant hyperthermia due to anesthesia; Nausea & vomiting; Nicotine vapor product user; Non-nicotine vapor product user; Pseudocholinesterase deficiency; PUD (peptic ulcer disease); Rheumatic fever; Seizures ([facility] [address] [facility] Sleep apnea; Stroke [facility]); or Thyroid disease. Ms. Jeanie Oquendo  has a past surgical history that includes hx cholecystectomy (2000); pr biopsy of breast, incisional; pr biopsy of breast, needle core; hx tonsillectomy (N/A); hx gyn (11/09/2017); and hx breast biopsy (Bilateral). Social History/Living Environment: Patient lives with family in a two-story apartment which is on the first floor. 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 She was exercising 30 minutes at [employer] prior to symptom onset. Current Medications:       Current Outpatient Prescriptions:     ibuprofen (MOTRIN) 800 mg tablet, Take 1 Tab by mouth every eight (8) hours as needed for Pain., Disp: 30 Tab, Rfl: 0    multivitamin (ONE A DAY) tablet, Take 1 Tab by mouth daily. Indications: VITAMIN DEFICIENCY PREVENTION, Disp: , Rfl:     OTHER, daily. Black Seed oil, Disp: , Rfl:        Date Last Reviewed:  8-13-18   Number of Personal Factors/Comorbidities that affect the Plan of Care: 1-2: MODERATE COMPLEXITY   EXAMINATION:   7-16-18  Observation/Orthostatic Postural Assessment: B genu valgus and bilateral pronation noted when standing and ambulating    Palpation: No increased tenderness to either knee. ROM:    Bilateral knee active ROM grossly equivalent                    Strength:   L hip abduction 5/5   R hip abduction 5/5      Special Tests: none performed  Neurological Screen: not tested  Functional Mobility:  Sit to/from Stand: independent.  Bed Mobility: independent  Walking on level ground: no antalgic gait pattern demonstrated when ambulating on level ground. Stair ascent/descent: reciprocal gait pattern utilized to to ascend and descend stairs with intermittent use of single handrail          Balance:  Single-leg stand: firm surface/eyes open L= 12 seconds, R= 15 seconds           Body Structures Involved:  1. Nerves  2. Bones  3. Joints  4. Muscles Body Functions Affected:  1. Sensory/Pain  2. Neuromusculoskeletal  3. Movement Related Activities and Participation Affected:  1. Mobility  2. Interpersonal Interactions and Relationships  3. Community, Social and [place]   Number of elements (examined above) that affect the Plan of Care: 4+: HIGH COMPLEXITY   CLINICAL PRESENTATION:   Presentation: Stable and uncomplicated: LOW COMPLEXITY   CLINICAL DECISION MAKING:   Outcome Measure: Tool Used: Lower Extremity Functional Scale (LEFS)  Score:  Initial: 31/80 Most Recent: 60/80 (Date: 7-16-18 )   Interpretation of Score: 20 questions each scored on a 5 point scale with 0 representing \"extreme difficulty or unable to perform\" and 4 representing \"no difficulty\". The lower the score, the greater the functional disability. 80/80 represents no disability. Minimal detectable change is 9 points. Medical Necessity:   · Skilled intervention continues to be required due to increased R knee pain which limits the patient's mobility. Reason for Services/Other Comments:  · Patient continues to require skilled intervention due to increased R knee pain. Use of outcome tool(s) and clinical judgement create a POC that gives a: Clear prediction of patient's progress: LOW COMPLEXITY            TREATMENT:   (In addition to Assessment/Re-Assessment sessions the following treatments were rendered)  Pre-treatment Symptoms/Complaints:  Patient reports that she has been walking some around her neighborhood and her knee has been pretty good.    Pain: Initial:  None  Post Session:  None reported afterward     Discussed with patient changing to a motion control/stability shoe to reduce pronation. Patient brought the shoes she wears to workout in, and demonstrated poor motion control in these shoes with navicular drop bilaterally and visible pronation when in static stance. PT advised patient at least try on motion control/stability shoes to control pronation and reduce stress on bilateral knees, particularly when exercising. Also recommended patient consider orthotic inserts to support arches of feet. Modalities (0 minutes) not performed today  . Manual therapy (0 minutes) none    Therapeutic Exercises (40 minutes) for reduced bilateral knee pain and improved bilateral LE strength per grid below. Stretching of bilateral hamstrings in supine (5 x 20\"), and bilateral quadriceps in prone  (5 x 20\") to reduce discomfort and tightness.      Date  7-11-18 Date  7-16-18 Date  7-18-18 Date  7-30-18 Date  8-6-18 Date  8-13-18 Date  8-20-18 Date  9-5-18 Date  9-10-18   Activity/Exercise            Straight leg raise       3 x 10 R     Side-lying hip ABD   0# x 10, 1# 2 x 10 ea LE     3 x 10 R 1 x 10 R 0#     1#2 x 10 R   Clamshells Black 2 x 10 ea Black 3 x 10 ea Black, 3 x 10 ea Black 3 x 10 Black 3 x 10 ea Black 3 x 10 ea Black 3 x 10 ea  Black  3 x 10 ea   Bridging 3 x 10 bilateral 3 x 10 bilat 3 x 10 bilateral 3 x 10 bilateral 3 x 10 bilateral 3 x 10 bilateral 3 x 10 bilateral 3 x 10 bilateral 3 x 10 bilateral   Slantboard calf stretch 3 x 30\" bilaterla 3 x 30\" bilateral 3 x 30\" bilateral 3 x 30\" bilateral 3 x 30\" bilateral 3 x 30\" bilateral 3 x 30\" bilaeral 3 x 30\" bilateral 3  X30\" bilateral   Step ups 4\" x 10 ea  6# 2 x 10 ea 6\" 2  x10 ea    1 flight of stairs w/occasional use of single handrail 6\" 3 x 10 ea 8\" 2 x 10 ea 4\" 1 x 10   6\" 2 x 10 ea LE 6\" 2 x 10 ea LE  4\" 2 x 10 ea LE    Sit to stands From weight bench, 3 x 10 From weight bench, 3 x 10  From standard chair 3 x 10 B        Shuttle press    Shuttle press  75# x 10 B  100# 2 x 10 B Shuttle press 100# 3 x 10 bilateral      Squats   Shuttle press 75# 1 x 10, 100# 2 x 10 bilateral          Single limb stance 3 x 10\" on each LE 3 trials of single leg stance on each LE          Calf raises     2 x 15 bilateral       Trunk stability        posterior pelvic tilt (PPT) 2 x 10    PPT + hip ABD (blue) 2 x 10    PPT + hip ADD squeeze 2 x 10 Posterior pelvic tilt 2 x 15     PPT + hip ABD 2 x 10 (blue)    PPT + hip ADD          HEP: No changes today. Patient is to continue with existing HEP. Treatment/Session Assessment:    · Response to Treatment:  Patient did well today with performance of therapeutic exercises. No knee pain reported during PT. Limited by genu valgus and bilateral pronation which may be assisted by more supportive footwear. · Compliance with Program/Exercises: Compliant. · Recommendations/Intent for next treatment session: \"Next visit will continue to increase patient's activity level while monitoring R knee symptoms.    Total Treatment Duration: 40 Minutes  PT Patient Time In/Time Out  Time In: 1652  Time Out: [address] [facility] [address], PT

## 2018-09-17 ENCOUNTER — HOSPITAL ENCOUNTER (OUTPATIENT)
Dept: PHYSICAL THERAPY | Age: 38
Discharge: HOME | End: 2018-09-17
Payer: COMMERCIAL

## 2018-09-17 PROCEDURE — 97110 THERAPEUTIC EXERCISES: CPT

## 2018-09-17 PROCEDURE — 97140 MANUAL THERAPY 1/> REGIONS: CPT

## 2018-09-17 NOTE — PROGRESS NOTES
Morales Reynolds  : 1980  Payor: Maile Hernandez / Plan: Replaced by [facility] / Product Type: PPO /  [address] [place]  at [facility] [address]  [address] [facility], [address] [place] [address],[address], [address] [facility]  [phone]   [phone]       OUTPATIENT PHYSICAL THERAPY:Daily Note 2018     ICD-10: Treatment Diagnosis: Pain in right knee (M25.561)  Precautions/Allergies:   Lisinopril; Lorabid [loracarbef]; and Lortab [hydrocodone-acetaminophen]   Fall Risk Score: 0 (? 5 = High Risk)  MD Orders: evaluate and treat MEDICAL/REFERRING DIAGNOSIS:  R knee pain   DATE OF ONSET: 2.5 weeks ago  REFERRING PHYSICIAN: Marion Baltazar MD  RETURN PHYSICIAN APPOINTMENT: TBD     PROGRESS ASSESSMENT (18):  Pt presents with R knee pain. Pt demonstrates pain, slightly decreased ROM, and bilateral hip weakness which are limiting basic mobility and normal activity level. She will benefit from PT for guided rehab to promote normalized return of motion, strength, and function in order for safe return to routine activities without pain. PROBLEM LIST (Impacting functional limitations):  1. R knee pain  2. Decreased ROM R knee   3. Decreased functional strength R knee/LE   4. Decreased gait skills INTERVENTIONS PLANNED:    1. Thermal and electric modalities, manual therapies for pain. 2. Manual therapies and therapeutic exercises for ROM and strength. 3. Therapeutic exercises for gait and balance  4. Home exercise program   TREATMENT PLAN:  Effective Dates: 2018 TO 2018. Frequency/Duration: 2 visits per week for  8 weeks  GOALS: (Goals have been discussed and agreed upon with patient.)   Short-Term Functional Goals: Time Frame: 4 weeks  1. Report no more than minimal, intermittent 0-1/10 pain R knee with basic walking and ADL's, and score greater than 50/80 on the LEFS. MET 18  2.  R quad strength is 5/5 with good terminal extension strength and no pain for stability with walking and progressing into strengthening phase. MET per absence of quad lag with straight leg raises 7-16-18  3. Walking without limp on level surfaces and up stairs. MET 7-16-18  Discharge Goals: Time Frame: 8 weeks  1. No significant pain R knee with all normalized daily home and work activities, and score greater than 60/80 on the LEFS. MET 7-16-18  2. Demonstrate R knee ROM equal to the L, and no pain when ambulating on treadmill x 30 minutes. Ongoing, progressing 7-16-18  3. Able to balance with good control in single-leg stand greater than 15 seconds with eyes open, greater than 10 seconds with eyes closed for improved dynamic stability. Ongoing, progressing 7-16-18  4. Independent with [facility] for advanced knee strengthening. Rehabilitation Potential For Stated Goals: Good                                                                                The information in this section was collected on 6-14-18 (except where otherwise noted). HISTORY:   History of Present Injury/Illness (Reason for Referral):  Patient reports that she began experiencing a sharp pain in her R knee when ambulating for exercise on an inclined treadmill. Patient has had to cease her walking due to this knee pain and sought medical attention due to the increased pain when ambulating. Past Medical History/Comorbidities: Ms. Rhoda Saldivar  has a past medical history of DVT of leg (deep venous thrombosis) ([address] Encounter for insertion of mirena IUD (01/03/2016); GERD (gastroesophageal reflux disease); H/O seasonal allergies; Hypertension; Hypertension; Irregular periods; Menstrual migraine without status migrainosus, not intractable (10/19/2015); Migraines; Morbid obesity ([address] Psychiatric disorder; Pulmonary embolus ([address] and Sleep disorder. She also has no past medical history of Aneurysm ([address] Arrhythmia; Arthritis; Asthma; Autoimmune disease ([address] CAD (coronary artery disease); Cancer ([address]  Chronic kidney disease; Chronic obstructive pulmonary disease ([facility] [address] Chronic pain; Coagulation disorder ([address] Diabetes ([facility] [address] Difficult intubation; Endocarditis; Heart failure ([facility] [address] Liver disease; Malignant hyperthermia due to anesthesia; Nausea & vomiting; Nicotine vapor product user; Non-nicotine vapor product user; Pseudocholinesterase deficiency; PUD (peptic ulcer disease); Rheumatic fever; Seizures ([facility] [address] Sleep apnea; Stroke [facility]); or Thyroid disease. Ms. Ethan Lozada  has a past surgical history that includes hx cholecystectomy (2000); pr biopsy of breast, incisional; pr biopsy of breast, needle core; hx tonsillectomy (N/A); hx gyn (11/09/2017); and hx breast biopsy (Bilateral). Social History/Living Environment: Patient lives with family in a two-story apartment which is on the first floor. 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 She was exercising 30 minutes at [employer] prior to symptom onset. Current Medications:       Current Outpatient Prescriptions:     ibuprofen (MOTRIN) 800 mg tablet, Take 1 Tab by mouth every eight (8) hours as needed for Pain., Disp: 30 Tab, Rfl: 0    multivitamin (ONE A DAY) tablet, Take 1 Tab by mouth daily. Indications: VITAMIN DEFICIENCY PREVENTION, Disp: , Rfl:     OTHER, daily. Black Seed oil, Disp: , Rfl:        Date Last Reviewed:  8-13-18   Number of Personal Factors/Comorbidities that affect the Plan of Care: 1-2: MODERATE COMPLEXITY   EXAMINATION:   7-16-18  Observation/Orthostatic Postural Assessment: B genu valgus and bilateral pronation noted when standing and ambulating    Palpation: No increased tenderness to either knee. ROM:    Bilateral knee active ROM grossly equivalent                    Strength:   L hip abduction 5/5   R hip abduction 5/5      Special Tests: none performed  Neurological Screen: not tested  Functional Mobility:  Sit to/from Stand: independent.  Bed Mobility: independent  Walking on level ground: no antalgic gait pattern demonstrated when ambulating on level ground. Stair ascent/descent: reciprocal gait pattern utilized to to ascend and descend stairs with intermittent use of single handrail          Balance:  Single-leg stand: firm surface/eyes open L= 12 seconds, R= 15 seconds           Body Structures Involved:  1. Nerves  2. Bones  3. Joints  4. Muscles Body Functions Affected:  1. Sensory/Pain  2. Neuromusculoskeletal  3. Movement Related Activities and Participation Affected:  1. Mobility  2. Interpersonal Interactions and Relationships  3. Community, Social and [place]   Number of elements (examined above) that affect the Plan of Care: 4+: HIGH COMPLEXITY   CLINICAL PRESENTATION:   Presentation: Stable and uncomplicated: LOW COMPLEXITY   CLINICAL DECISION MAKING:   Outcome Measure: Tool Used: Lower Extremity Functional Scale (LEFS)  Score:  Initial: 31/80 Most Recent: 60/80 (Date: 7-16-18 )   Interpretation of Score: 20 questions each scored on a 5 point scale with 0 representing \"extreme difficulty or unable to perform\" and 4 representing \"no difficulty\". The lower the score, the greater the functional disability. 80/80 represents no disability. Minimal detectable change is 9 points. Medical Necessity:   · Skilled intervention continues to be required due to increased R knee pain which limits the patient's mobility. Reason for Services/Other Comments:  · Patient continues to require skilled intervention due to increased R knee pain. Use of outcome tool(s) and clinical judgement create a POC that gives a: Clear prediction of patient's progress: LOW COMPLEXITY            TREATMENT:   (In addition to Assessment/Re-Assessment sessions the following treatments were rendered)  Pre-treatment Symptoms/Complaints:  Patient reports that knee has been doing 'pretty good'. Occasionally has some discomfort. Reports some stiffness today and mild discomfort.   Pain: Initial:  No numeric value  Post Session:  None reported afterward     Modalities (0 minutes) not performed today  . Manual therapy (12 minutes) to reduce R knee discomfort. Grade 2-3 patellofemoral joint mobilizations, grade 3-4 ankle joint mobilizations, grade 2-3 tibiofemoral accessory joint mobilizations and grade 2-3 physio joint mobilizations to reduce R knee discomfort. Therapeutic Exercises (28 minutes) for reduced bilateral knee pain and improved bilateral LE strength per grid below. Stretching of bilateral hamstrings in supine (5 x 20\"), bilateral hip flexors  and bilateral quadriceps in prone  (5 x 20\") to reduce discomfort and tightness.      Date  7-11-18 Date  7-16-18 Date  7-18-18 Date  7-30-18 Date  8-6-18 Date  8-13-18 Date  8-20-18 Date  9-5-18 Date  9-10-18 Date  9-17-18   Activity/Exercise             Straight leg raise       3 x 10 R      Side-lying hip ABD   0# x 10, 1# 2 x 10 ea LE     3 x 10 R 1 x 10 R 0#     1#2 x 10 R    Clamshells Black 2 x 10 ea Black 3 x 10 ea Black, 3 x 10 ea Black 3 x 10 Black 3 x 10 ea Black 3 x 10 ea Black 3 x 10 ea  Black  3 x 10 ea    Bridging 3 x 10 bilateral 3 x 10 bilat 3 x 10 bilateral 3 x 10 bilateral 3 x 10 bilateral 3 x 10 bilateral 3 x 10 bilateral 3 x 10 bilateral 3 x 10 bilateral PPT + bridge 2 x 10 b   Slantboard calf stretch 3 x 30\" bilaterla 3 x 30\" bilateral 3 x 30\" bilateral 3 x 30\" bilateral 3 x 30\" bilateral 3 x 30\" bilateral 3 x 30\" bilaeral 3 x 30\" bilateral 3  X30\" bilateral 3 x 20\" bilateral   Step ups 4\" x 10 ea  6# 2 x 10 ea 6\" 2  x10 ea    1 flight of stairs w/occasional use of single handrail 6\" 3 x 10 ea 8\" 2 x 10 ea 4\" 1 x 10   6\" 2 x 10 ea LE 6\" 2 x 10 ea LE  4\" 2 x 10 ea LE  4\"    Sit to stands From weight bench, 3 x 10 From weight bench, 3 x 10  From standard chair 3 x 10 B         Shuttle press    Shuttle press  75# x 10 B  100# 2 x 10 B  Shuttle press 100# 3 x 10 bilateral    Shuttle press  100# x 10  112.5# 2 x 10   Squats   Shuttle press 75# 1 x 10, 100# 2 x 10 bilateral Single limb stance 3 x 10\" on each LE 3 trials of single leg stance on each LE           Calf raises     2 x 15 bilateral        Trunk stability        posterior pelvic tilt (PPT) 2 x 10    PPT + hip ABD (blue) 2 x 10    PPT + hip ADD squeeze 2 x 10 Posterior pelvic tilt 2 x 15     PPT + hip ABD 2 x 10 (blue)    PPT + hip ADD           HEP: No changes today. Patient is to continue with existing HEP. Treatment/Session Assessment:    · Response to Treatment:  Patient less limited by knee symptoms. Reviewed with pt again the possible need for motion control shoes to assist with pronation. Minimal cueing with step ups to promote pelvic alignment to avoid compensatory movements to avoid knee discomfort. · Compliance with Program/Exercises: Compliant. · Recommendations/Intent for next treatment session: \"Next visit will continue to increase patient's activity level while monitoring R knee symptoms.    Total Treatment Duration: 40 Minutes  PT Patient Time In/Time Out  Time In: 1650  Time Out: [place], PT

## 2018-09-24 ENCOUNTER — HOSPITAL ENCOUNTER (OUTPATIENT)
Dept: PHYSICAL THERAPY | Age: 38
Discharge: HOME | End: 2018-09-24
Payer: COMMERCIAL

## 2018-09-24 PROCEDURE — 97110 THERAPEUTIC EXERCISES: CPT

## 2018-09-24 NOTE — THERAPY RECERTIFICATION
Nitza Boston  : 1980  Payor: Ronan Tabor / Plan: [place] BLUE CR[place] OF [address] / Product Type: PPO /  [id number]  at [facility] PRISCA TREJO  [address] [facility], [address] [place] [address] [address], [address] [facility]  [phone]   [phone]       OUTPATIENT PHYSICAL THERAPY:Daily Note 2018     ICD-10: Treatment Diagnosis: Pain in right knee (M25.561)  Precautions/Allergies:   Lisinopril; Lorabid [loracarbef]; and Lortab [hydrocodone-acetaminophen]   Fall Risk Score: 0 (? 5 = High Risk)  MD Orders: evaluate and treat MEDICAL/REFERRING DIAGNOSIS:  R knee pain   DATE OF ONSET: 2.5 weeks ago  REFERRING PHYSICIAN: Ryan Sahu MD  RETURN PHYSICIAN APPOINTMENT: TBD     PROGRESS ASSESSMENT (18):  Pt presents to PT with R knee pain, which has made steady progress since beginning PT. At this time, patient will be on hold from therapy with home exercise program and gradual resumption of activity. Patient instructed to return to the gym, and perform her HEP, and to contact PT should her symptoms return. This episode will be left open for 5 weeks in the event patient's symptoms return she can be scheduled for additional visits. PROBLEM LIST (Impacting functional limitations):  1. R knee pain  2. Decreased ROM R knee   3. Decreased functional strength R knee/LE   4. Decreased gait skills INTERVENTIONS PLANNED:    1. Thermal and electric modalities, manual therapies for pain. 2. Manual therapies and therapeutic exercises for ROM and strength. 3. Therapeutic exercises for gait and balance  4. Home exercise program   TREATMENT PLAN:  Effective Dates: 18 TO 10/25/2018. Frequency/Duration: 1 visits per week (or as needed) for 11 weeks  GOALS: (Goals have been discussed and agreed upon with patient.)   Short-Term Functional Goals: Time Frame: 4 weeks  1. Report no more than minimal, intermittent 0-1/10 pain R knee with basic walking and ADL's, and score greater than 50/80 on the LEFS. MET 18  2.  R quad strength is 5/5 with good terminal extension strength and no pain for stability with walking and progressing into strengthening phase. MET per absence of quad lag with straight leg raises 7-16-18  3. Walking without limp on level surfaces and up stairs. MET 7-16-18  Discharge Goals: Time Frame: 8 weeks  1. No significant pain R knee with all normalized daily home and work activities, and score greater than 60/80 on the LEFS. MET 7-16-18  2. Demonstrate R knee ROM equal to the L, and no pain when ambulating on treadmill x 30 minutes. Ongoing, progressing 9-24-18  3. Able to balance with good control in single-leg stand greater than 15 seconds with eyes open, greater than 10 seconds with eyes closed for improved dynamic stability. Ongoing, progressing 9-24-18  4. Independent with [facility] for advanced knee strengthening. Rehabilitation Potential For Stated Goals: Good    Regarding Kaylyn Medina's therapy, I certify that the treatment plan above will be carried out by a therapist or under their direction. Thank you for this referral,      Yo Paulino PT     Referring Physician Signature:     Torres Dhillon MD          Date                                                                                          The information in this section was collected on 6-14-18 (except where otherwise noted). HISTORY:   History of Present Injury/Illness (Reason for Referral):  Patient reports that she began experiencing a sharp pain in her R knee when ambulating for exercise on an inclined treadmill. Patient has had to cease her walking due to this knee pain and sought medical attention due to the increased pain when ambulating. Past Medical History/Comorbidities: Ms. Justina Orona  has a past medical history of DVT of leg (deep venous thrombosis) ([facility] [address] Encounter for insertion of mirena IUD (01/03/2016); GERD (gastroesophageal reflux disease); H/O seasonal allergies; Hypertension;  Hypertension; Irregular periods; Menstrual migraine without status migrainosus, not intractable (10/19/2015); Migraines; Morbid obesity ([address] Psychiatric disorder; Pulmonary embolus ([address] and Sleep disorder. She also has no past medical history of Aneurysm ([address] Arrhythmia; Arthritis; Asthma; Autoimmune disease ([address] CAD (coronary artery disease); Cancer ([address] Chronic kidney disease; Chronic obstructive pulmonary disease ([address] Chronic pain; Coagulation disorder ([address] Diabetes ([address] Difficult intubation; Endocarditis; Heart failure ([address] Liver disease; Malignant hyperthermia due to anesthesia; Nausea & vomiting; Nicotine vapor product user; Non-nicotine vapor product user; Pseudocholinesterase deficiency; PUD (peptic ulcer disease); Rheumatic fever; Seizures ([address] Sleep apnea; Stroke [facility]); or Thyroid disease. Ms. Aylin Baker  has a past surgical history that includes hx cholecystectomy (2000); pr biopsy of breast, incisional; pr biopsy of breast, needle core; hx tonsillectomy (N/A); hx gyn (11/09/2017); and hx breast biopsy (Bilateral). Social History/Living Environment: Patient lives with family in a two-story apartment which is on the first floor. 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 She was exercising 30 minutes at [employer] prior to symptom onset. Current Medications:       Current Outpatient Prescriptions:     ibuprofen (MOTRIN) 800 mg tablet, Take 1 Tab by mouth every eight (8) hours as needed for Pain., Disp: 30 Tab, Rfl: 0    multivitamin (ONE A DAY) tablet, Take 1 Tab by mouth daily. Indications: VITAMIN DEFICIENCY PREVENTION, Disp: , Rfl:     OTHER, daily.  Black Seed oil, Disp: , Rfl:     Was put on blood pressure medicine last week (9-24-18)   Date Last Reviewed:  9-24-18   EXAMINATION:   9-24-18  Observation/Orthostatic Postural Assessment: B genu valgus and bilateral pronation noted when standing and ambulating    Palpation: Mildly increased tenderness to medial R knee. ROM:                 R LE 4 deg hyperextension to 115 degrees      Strength:   n/t   n/t      Special Tests: none performed  Neurological Screen: not tested  Functional Mobility:  Independent with all mobility. No antalgic gait pattern. Balance:  Not assessed           Body Structures Involved:  1. Nerves  2. Bones  3. Joints  4. Muscles Body Functions Affected:  1. Sensory/Pain  2. Neuromusculoskeletal  3. Movement Related Activities and Participation Affected:  1. Mobility  2. Interpersonal Interactions and Relationships  3. Community, Social and Civic Life   CLINICAL PRESENTATION:   CLINICAL DECISION MAKING:   Outcome Measure: Tool Used: Lower Extremity Functional Scale (LEFS)  Score:  Initial: 31/80  60/80 (Date: 7-16-18 ) Most recent: 72/80 (9-24-18)   Interpretation of Score: 20 questions each scored on a 5 point scale with 0 representing \"extreme difficulty or unable to perform\" and 4 representing \"no difficulty\". The lower the score, the greater the functional disability. 80/80 represents no disability. Minimal detectable change is 9 points. Medical Necessity:   · Skilled intervention continues to be required due to increased R knee pain which limits the patient's mobility. Reason for Services/Other Comments:  · Patient continues to require skilled intervention due to increased R knee pain.

## 2018-12-20 ENCOUNTER — HOSPITAL ENCOUNTER (OUTPATIENT)
Dept: PHYSICAL THERAPY | Age: 38
End: 2018-12-20

## 2019-01-02 ENCOUNTER — HOSPITAL ENCOUNTER (OUTPATIENT)
Dept: PHYSICAL THERAPY | Age: 39
Discharge: HOME | End: 2019-01-02
Payer: COMMERCIAL

## 2019-01-02 PROCEDURE — 97161 PT EVAL LOW COMPLEX 20 MIN: CPT

## 2019-01-02 PROCEDURE — 97140 MANUAL THERAPY 1/> REGIONS: CPT

## 2019-01-02 NOTE — THERAPY EVALUATION
Divya Crawley : 1980 Primary: [facility] Secondary:  [facility], [address], [address] [place] [address] [address] [facility] [phone]   [phone] OUTPATIENT PHYSICAL THERAPY:Initial Assessment 2019 ICD-10: Treatment Diagnosis: Cervicalgia (M54.2) Precautions/Allergies:  
Lisinopril; Lorabid [loracarbef]; and Lortab [hydrocodone-acetaminophen] MD Orders: evaluate and treat. Traction  MEDICAL/REFERRING DIAGNOSIS: 
Radiculopathy, cervical region [M54.12] Cervicalgia [M54.2] DATE OF ONSET: 1 month ago. REFERRING PHYSICIAN: Leif Quevedo PHYSICIAN APPOINTMENT:    
ASSESSMENT (Date: 19):  Ms. Ольга Fabian presents to PT with complaints of pain and weakness in B UE. She demonstrated some weakness of shoulder flexion bilaterally but it was symmetrical. light touch sensation was [place]. She has some limited ROM in her cervical spine. She had some minor relief with manual traction of the cervical spine. She would benefit from skilled PT to address the problem list and goals below. PROBLEM LIST (Impacting functional limitations): 1. Decreased Strength 2. Decreased ADL/Functional Activities 3. Increased Pain 4. Decreased Flexibility/Joint Mobility 5. Decreased [place] with Home Exercise Program INTERVENTIONS PLANNED: 
1. Cold 2. Electrical Stimulation 3. Heat 4. Home Exercise Program (HEP) 5. Manual Therapy 6. Neuromuscular Re-education/Strengthening 7. Range of Motion (ROM) 8. Therapeutic Activites 9. Therapeutic Exercise/Strengthening 10. traction TREATMENT PLAN: 
Effective Dates: 2019 TO 2019 (30 days). Frequency/Duration: 2 times a week for 30 Days GOALS: (Goals have been discussed and agreed upon with patient.) Discharge Goals: Time Frame: 30 days 1. Patient will be independent with HEP.   
2. Patient will have a decrease on the NDI to 10 indicating a reduction in symptoms. 3. Patient will verbalize work place modification to improve her posture and decrease recurring symptoms. 4. Patient will report pain no greater than 3/10 in 24 hours indicating improved ability to complete ADLs and IADLs. Rehabilitation Potential For Stated Goals: Good Regarding Kaylyn Medina's therapy, I certify that the treatment plan above will be carried out by a therapist or under their direction. Thank you for this referral, 
Sherita Chao, DPT, NCS    
  
    
 
 
HISTORY:  
Patient Stated Goal: 
      Less pain History of Present Injury/Illness (Reason for Referral): 
Patient reports neck pain that started on her R side. She says that it started as arthritis pain but now she feels like she has weakness in her R arm. If she lies on 1 side too long, she goes numb. No MRI, patient reports x-ray was negative. Pain is worse by the end of the day. The pain is now down the left and the right arm. She likes to sleep on her R side but she has been having to sleep on her back. 1 pillow under her head. Past Medical History/Comorbidities: Ms. Mana Gonzalez  has a past medical history of DVT of leg (deep venous thrombosis) ([address] Encounter for insertion of mirena IUD, GERD (gastroesophageal reflux disease), H/O seasonal allergies, Hypertension, Hypertension, Irregular periods, Menstrual migraine without status migrainosus, not intractable, Migraines, Morbid obesity ([address] Psychiatric disorder, Pulmonary embolus ([address] and Sleep disorder. Ms. Mana Gonzalez  has a past surgical history that includes hx cholecystectomy (2000); pr biopsy of breast, incisional; pr biopsy of breast, needle core; hx tonsillectomy (N/A); hx gyn (11/09/2017); hx breast biopsy (Bilateral); and ROBOTIC ASSISTED HYSTERECTOMY WITH BILATERAL SALPINGECTOMY, CYSTOSCOPY (N/A, 11/9/2017). Per medical history questionnaire/therapist interview: no other PMI Social History/Living Environment: lives with someone. No assistance with ADLs. Drives. Prior Level of Function/Work/Activity: 
Works at a computer all day. Dominant Side:  
      RIGHT Current Medications:   
Current Outpatient Medications:  
  amLODIPine (NORVASC) 10 mg tablet, Take 1 Tab by mouth daily. , Disp: 90 Tab, Rfl: 1   raNITIdine (ZANTAC) 150 mg tablet, Take 1 Tab by mouth two (2) times a day., Disp: 60 Tab, Rfl: 0 
  multivitamin (ONE A DAY) tablet, Take 1 Tab by mouth daily. Indications: VITAMIN DEFICIENCY PREVENTION, Disp: , Rfl:  
  OTHER, daily. Black Seed oil, Disp: , Rfl:  
  sertraline (ZOLOFT) 25 mg tablet, Take 25 mg by mouth daily. Take / use AM day of surgery  per anesthesia protocols. Indications: Generalized Anxiety Disorder, Disp: , Rfl:   
 
Date Last Reviewed:  1/2/2019 Ambulatory/Rehab Services H2 Model Falls Risk Assessment Risk Factors: 
     No Risk Factors Identified Ability to Rise from Chair: 
     (0)  Ability to rise in a single movement Falls Prevention Plan: No modifications necessary Total: (5 or greater = High Risk): 0  
 ©2010 [facility] [address] [place] [address] States Patent #[id number]. Federal Law prohibits the replication, distribution or use without written permission from [facility] [employer] Number of Personal Factors/Comorbidities that affect the Plan of Care: 0: LOW COMPLEXITY EXAMINATION:  
Observation/Orthostatic Postural Assessment:   
      Overweight, rounded shoulders and forward head Mental Status: WFL Palpation:   
      Increased muscle tone in upper traps, levator, rhomboids Sensation:  
      Light tough: WFL; Proprioception: [place] [place] Skin Integrity:   
      intact Vision:   
      NT Special Tests:   
      Decrease in hand burning with manual distraction. No increase in symptoms with compression Coordination: 
     NT Balance:   
      NT Lower Extremity: grossly 4+/5 Strength PROM Action R L R  L   
 Hip Flexion Hip Extension Hip Abduction Hip Adduction Knee Flexion Knee Extension Dorsi Flexion Plantar Flexion Inversion Eversion Cervical ROM: 
Rotation [facility] Sidebending: R more limited than L Extension: 70% Flexion 80% Upper Extremity:  
      B shoulder flexion: 4/5 B shoulder abduction 4+/5 B elbow flexion 4+/5 B elbow extension 4+/5 MMT grossly symmetrical 
Functional Mobility:  
      Gait/Ambulation:  functional 
      Transfers: Mod I Bed Mobility:  Mod I Stairs:  NT Wheelchair:  NT Body Structures Involved: 1. Nerves 2. Bones 3. Joints 4. Muscles Body Functions Affected: 1. Sensory/Pain 2. Neuromusculoskeletal 
3. Movement Related Activities and Participation Affected: 1. Mobility 2. Self Care 3. Domestic Life 4. Interpersonal Interactions and Relationships Number of elements that affect the Plan of Care: 4+: HIGH COMPLEXITY CLINICAL PRESENTATION:  
Presentation: Stable and uncomplicated: LOW COMPLEXITY CLINICAL DECISION MAKING:  
Outcome Measure: Tool Used: Neck Disability Index (NDI) Score:  Initial: 20/50  Most Recent: X/50 (Date: -- ) Interpretation of Score: The Neck Disability Index is a revised form of the Oswestry Low Back Pain Index and is designed to measure the activities of daily living in person's with neck pain. Each section is scored on a 0-5 scale, 5 representing the greatest disability. The scores of each section are added together for a total score of 50. Score 0 1-10 11-20 21-30 31-40 41-49 50 Modifier CH CI CJ CK CL CM CN Medical Necessity:  
· Patient is expected to demonstrate progress in strength, range of motion and functional technique to decrease pain with ADLs and IADLs. · Patient demonstrates good rehab potential due to higher previous functional level. Reason for Services/Other Comments: · Patient continues to require modification of therapeutic interventions to increase complexity of exercises. Use of outcome tool(s) and clinical judgement create a POC that gives a: Questionable prediction of patient's progress: MODERATE COMPLEXITY  
TREATMENT:  
(In addition to Assessment/Re-Assessment sessions the following treatments were rendered) Pre Treatment Symptoms: see above MANUAL THERAPY: (10 minutes minutes): Joint mobilization and Soft tissue mobilization was utilized and necessary because of the patient's restricted joint motion and restricted motion of soft tissue. Gentle manual distraction 10 reps with 10 second hold. Soft tissue mobilization to upper cervical musculature to improve ROM and decrease pain. Performed with patient in supine with wedge under knees. Treatment/Session Assessment:  See assessment above. · Pain/ Symptoms: Initial:   6/10 Post Session:  5/10 · Compliance with Program/Exercises: Will assess as treatment progresses. · Recommendations/Intent for next treatment session: \"Next visit will focus on advancements to more challenging activities and reduction in assistance provided\". Total Treatment Duration: PT Patient Time In/Time Out Time In: 2346 Time Out: 1010 Caridad Davis DPT

## 2019-01-07 ENCOUNTER — HOSPITAL ENCOUNTER (OUTPATIENT)
Dept: PHYSICAL THERAPY | Age: 39
Discharge: HOME | End: 2019-01-07
Payer: COMMERCIAL

## 2019-01-07 PROCEDURE — 97140 MANUAL THERAPY 1/> REGIONS: CPT

## 2019-01-07 NOTE — THERAPY EVALUATION
Dea Beyer : 1980 Primary: [facility]* Secondary:  Therapy Center at [facility] [address]", [place] [address] [address] [facility] [phone]   [phone] OUTPATIENT PHYSICAL THERAPY:Daily Note 2019 ICD-10: Treatment Diagnosis: Cervicalgia (M54.2) Precautions/Allergies:  
Lisinopril; Lorabid [loracarbef]; and Lortab [hydrocodone-acetaminophen] MD Orders: evaluate and treat. Traction  MEDICAL/REFERRING DIAGNOSIS: 
Radiculopathy, cervical region [M54.12] Cervicalgia [M54.2] DATE OF ONSET: 1 month ago. REFERRING PHYSICIAN: Leif Neal 
RETURN PHYSICIAN APPOINTMENT:    
ASSESSMENT (Date: 19):  Ms. Sara Jaeger presents to PT with complaints of pain and weakness in B UE. She demonstrated some weakness of shoulder flexion bilaterally but it was symmetrical. light touch sensation was [place] [place]. She has some limited ROM in her cervical spine. She had some minor relief with manual traction of the cervical spine. She would benefit from skilled PT to address the problem list and goals below. PROBLEM LIST (Impacting functional limitations): 1. Decreased Strength 2. Decreased ADL/Functional Activities 3. Increased Pain 4. Decreased Flexibility/Joint Mobility 5. Decreased [place] with Home Exercise Program INTERVENTIONS PLANNED: 
1. Cold 2. Electrical Stimulation 3. Heat 4. Home Exercise Program (HEP) 5. Manual Therapy 6. Neuromuscular Re-education/Strengthening 7. Range of Motion (ROM) 8. Therapeutic Activites 9. Therapeutic Exercise/Strengthening 10. traction TREATMENT PLAN: 
Effective Dates: 2019 TO 2019 (30 days). Frequency/Duration: 2 times a week for 30 Days GOALS: (Goals have been discussed and agreed upon with patient.) Discharge Goals: Time Frame: 30 days 1. Patient will be independent with HEP. 2. Patient will have a decrease on the NDI to 10 indicating a reduction in symptoms. 3. Patient will verbalize work place modification to improve her posture and decrease recurring symptoms. 4. Patient will report pain no greater than 3/10 in 24 hours indicating improved ability to complete ADLs and IADLs. Rehabilitation Potential For Stated Goals: Good Regarding Kaylyn Medina's therapy, I certify that the treatment plan above will be carried out by a therapist or under their direction. Thank you for this referral, 
Deb Ramirez, PTA, NCS    
  
    
 
 
HISTORY:  
Patient Stated Goal: 
      Less pain History of Present Injury/Illness (Reason for Referral): 
Patient reports neck pain that started on her R side. She says that it started as arthritis pain but now she feels like she has weakness in her R arm. If she lies on 1 side too long, she goes numb. No MRI, patient reports x-ray was negative. Pain is worse by the end of the day. The pain is now down the left and the right arm. She likes to sleep on her R side but she has been having to sleep on her back. 1 pillow under her head. Past Medical History/Comorbidities: Ms. Ty Shetty  has a past medical history of DVT of leg (deep venous thrombosis) ([address] Encounter for insertion of mirena IUD, GERD (gastroesophageal reflux disease), H/O seasonal allergies, Hypertension, Hypertension, Irregular periods, Menstrual migraine without status migrainosus, not intractable, Migraines, Morbid obesity ([address] Psychiatric disorder, Pulmonary embolus ([address] and Sleep disorder. Ms. Ty Shetty  has a past surgical history that includes hx cholecystectomy (2000); pr biopsy of breast, incisional; pr biopsy of breast, needle core; hx tonsillectomy (N/A); hx gyn (11/09/2017); hx breast biopsy (Bilateral); and ROBOTIC ASSISTED HYSTERECTOMY WITH BILATERAL SALPINGECTOMY, CYSTOSCOPY (N/A, 11/9/2017). Per medical history questionnaire/therapist interview: no other PMI Social History/Living Environment: lives with someone. No assistance with ADLs. Drives. Prior Level of Function/Work/Activity: 
Works at a computer all day. Dominant Side:  
      RIGHT Current Medications:   
Current Outpatient Medications:  
  amLODIPine (NORVASC) 10 mg tablet, Take 1 Tab by mouth daily. , Disp: 90 Tab, Rfl: 1   raNITIdine (ZANTAC) 150 mg tablet, Take 1 Tab by mouth two (2) times a day., Disp: 60 Tab, Rfl: 0 
  multivitamin (ONE A DAY) tablet, Take 1 Tab by mouth daily. Indications: VITAMIN DEFICIENCY PREVENTION, Disp: , Rfl:  
  OTHER, daily. Black Seed oil, Disp: , Rfl:  
  sertraline (ZOLOFT) 25 mg tablet, Take 25 mg by mouth daily. Take / use AM day of surgery  per anesthesia protocols. Indications: Generalized Anxiety Disorder, Disp: , Rfl:   
 
Date Last Reviewed:  1/7/2019 Ambulatory/Rehab Services H2 Model Falls Risk Assessment Risk Factors: 
     No Risk Factors Identified Ability to Rise from Chair: 
     (0)  Ability to rise in a single movement Falls Prevention Plan: No modifications necessary Total: (5 or greater = High Risk): 0  
 ©2010 [facility] [address] [place] [address] States Patent #[id number]. Federal Law prohibits the replication, distribution or use without written permission from [facility] [employer] Number of Personal Factors/Comorbidities that affect the Plan of Care: 0: LOW COMPLEXITY EXAMINATION:  
Observation/Orthostatic Postural Assessment:   
      Overweight, rounded shoulders and forward head Mental Status: WFL Palpation:   
      Increased muscle tone in upper traps, levator, rhomboids Sensation:  
      Light tough: WFL; Proprioception: [place] [place] Skin Integrity:   
      intact Vision:   
      NT Special Tests:   
      Decrease in hand burning with manual distraction. No increase in symptoms with compression Coordination: 
     NT Balance:   
      NT Lower Extremity: grossly 4+/5 Strength PROM Action R L R  L   
 Hip Flexion Hip Extension Hip Abduction Hip Adduction Knee Flexion Knee Extension Dorsi Flexion Plantar Flexion Inversion Eversion Cervical ROM: 
Rotation [facility] Sidebending: R more limited than L Extension: 70% Flexion 80% Upper Extremity:  
      B shoulder flexion: 4/5 B shoulder abduction 4+/5 B elbow flexion 4+/5 B elbow extension 4+/5 MMT grossly symmetrical 
Functional Mobility:  
      Gait/Ambulation:  functional 
      Transfers: Mod I Bed Mobility:  Mod I Stairs:  NT Wheelchair:  NT Body Structures Involved: 1. Nerves 2. Bones 3. Joints 4. Muscles Body Functions Affected: 1. Sensory/Pain 2. Neuromusculoskeletal 
3. Movement Related Activities and Participation Affected: 1. Mobility 2. Self Care 3. Domestic Life 4. Interpersonal Interactions and Relationships Number of elements that affect the Plan of Care: 4+: HIGH COMPLEXITY CLINICAL PRESENTATION:  
Presentation: Stable and uncomplicated: LOW COMPLEXITY CLINICAL DECISION MAKING:  
Outcome Measure: Tool Used: Neck Disability Index (NDI) Score:  Initial: 20/50  Most Recent: X/50 (Date: -- ) Interpretation of Score: The Neck Disability Index is a revised form of the Oswestry Low Back Pain Index and is designed to measure the activities of daily living in person's with neck pain. Each section is scored on a 0-5 scale, 5 representing the greatest disability. The scores of each section are added together for a total score of 50. Score 0 1-10 11-20 21-30 31-40 41-49 50 Modifier CH CI CJ CK CL CM CN Medical Necessity:  
· Patient is expected to demonstrate progress in strength, range of motion and functional technique to decrease pain with ADLs and IADLs. · Patient demonstrates good rehab potential due to higher previous functional level. Reason for Services/Other Comments: · Patient continues to require modification of therapeutic interventions to increase complexity of exercises. Use of outcome tool(s) and clinical judgement create a POC that gives a: Questionable prediction of patient's progress: MODERATE COMPLEXITY  
TREATMENT:  
(In addition to Assessment/Re-Assessment sessions the following treatments were rendered) Pre Treatment Symptoms: Patient reports pain 6/10 today and tightness in neck and upper back with weakness and pain in right wrist with activity. Patient states she sits all day at desk and is now trying to be more aware of her posture and make corrections. Modalities: (15 minutes) Patient supine with bed elevated and two pillows under head and knee wedge in place for moist heat to neck and back to help decrease tightness and pain and help improve mobility. MANUAL THERAPY: (30 minutes): Joint mobilization and Soft tissue mobilization was utilized and necessary because of the patient's restricted joint motion and restricted motion of soft tissue. Patient supine for STM to bilateral cervical area, SCM, and suboccipital area followed by suboccipital release and cervical manual traction. Patient then sitting with arms supported for STM to bilateral upper traps and trigger point release in both areas with STM to neck and upper back. Treatment/Session Assessment:   
· Pain/ Symptoms: Initial:   6/10 Post Session:  No number given but patient reported relief with treatment and increased mobility · Compliance with Program/Exercises: Will assess as treatment progresses. · Recommendations/Intent for next treatment session: \"Next visit will focus on advancements to more challenging activities and reduction in assistance provided\". Total Treatment Duration: PT Patient Time In/Time Out Time In: 1430 Time Out: 1515 Cristina Lauren PTA

## 2019-01-10 ENCOUNTER — HOSPITAL ENCOUNTER (OUTPATIENT)
Dept: PHYSICAL THERAPY | Age: 39
Discharge: HOME | End: 2019-01-10
Payer: COMMERCIAL

## 2019-01-10 PROCEDURE — 97140 MANUAL THERAPY 1/> REGIONS: CPT

## 2019-01-10 PROCEDURE — 97110 THERAPEUTIC EXERCISES: CPT

## 2019-01-10 NOTE — THERAPY EVALUATION
Stanislavhector Jo : 1980 Primary: [facility]* Secondary:  Therapy Center at [facility] [address], [address] [facility], [place] [address] [address] [facility] [phone]   [phone] OUTPATIENT PHYSICAL THERAPY:Daily Note 1/10/2019 ICD-10: Treatment Diagnosis: Cervicalgia (M54.2) Precautions/Allergies:  
Lisinopril; Lorabid [loracarbef]; and Lortab [hydrocodone-acetaminophen] MD Orders: evaluate and treat. Traction  MEDICAL/REFERRING DIAGNOSIS: 
Radiculopathy, cervical region [M54.12] Cervicalgia [M54.2] DATE OF ONSET: 1 month ago. REFERRING PHYSICIAN: Leif Rodriguez 
[facility] PHYSICIAN APPOINTMENT:    
ASSESSMENT (Date: 19):  Ms. Wilfredo Mason presents to PT with complaints of pain and weakness in B UE. She demonstrated some weakness of shoulder flexion bilaterally but it was symmetrical. light touch sensation was [place] [place]. She has some limited ROM in her cervical spine. She had some minor relief with manual traction of the cervical spine. She would benefit from skilled PT to address the problem list and goals below. PROBLEM LIST (Impacting functional limitations): 1. Decreased Strength 2. Decreased ADL/Functional Activities 3. Increased Pain 4. Decreased Flexibility/Joint Mobility 5. Decreased [place] with Home Exercise Program INTERVENTIONS PLANNED: 
1. Cold 2. Electrical Stimulation 3. Heat 4. Home Exercise Program (HEP) 5. Manual Therapy 6. Neuromuscular Re-education/Strengthening 7. Range of Motion (ROM) 8. Therapeutic Activites 9. Therapeutic Exercise/Strengthening 10. traction TREATMENT PLAN: 
Effective Dates: 2019 TO 2019 (30 days). Frequency/Duration: 2 times a week for 30 Days GOALS: (Goals have been discussed and agreed upon with patient.) Discharge Goals: Time Frame: 30 days 1. Patient will be independent with HEP. 2. Patient will have a decrease on the NDI to 10 indicating a reduction in symptoms. 3. Patient will verbalize work place modification to improve her posture and decrease recurring symptoms. 4. Patient will report pain no greater than 3/10 in 24 hours indicating improved ability to complete ADLs and IADLs. Rehabilitation Potential For Stated Goals: Good Regarding Kaylyn TEJADA Medina's therapy, I certify that the treatment plan above will be carried out by a therapist or under their direction. Thank you for this referral, 
Laurent Dave, PTA, NCS    
  
    
 
 
HISTORY:  
Patient Stated Goal: 
      Less pain History of Present Injury/Illness (Reason for Referral): 
Patient reports neck pain that started on her R side. She says that it started as arthritis pain but now she feels like she has weakness in her R arm. If she lies on 1 side too long, she goes numb. No MRI, patient reports x-ray was negative. Pain is worse by the end of the day. The pain is now down the left and the right arm. She likes to sleep on her R side but she has been having to sleep on her back. 1 pillow under her head. Past Medical History/Comorbidities: Ms. Sara Mark  has a past medical history of DVT of leg (deep venous thrombosis) ([address] Encounter for insertion of mirena IUD, GERD (gastroesophageal reflux disease), H/O seasonal allergies, Hypertension, Hypertension, Irregular periods, Menstrual migraine without status migrainosus, not intractable, Migraines, Morbid obesity ([address] Psychiatric disorder, Pulmonary embolus ([address] and Sleep disorder. Ms. Sara Mark  has a past surgical history that includes hx cholecystectomy (2000); pr biopsy of breast, incisional; pr biopsy of breast, needle core; hx tonsillectomy (N/A); hx gyn (11/09/2017); hx breast biopsy (Bilateral); and ROBOTIC ASSISTED HYSTERECTOMY WITH BILATERAL SALPINGECTOMY, CYSTOSCOPY (N/A, 11/9/2017). Per medical history questionnaire/therapist interview: no other PMI Social History/Living Environment: lives with someone. No assistance with ADLs. Drives. Prior Level of Function/Work/Activity: 
Works at a computer all day. Dominant Side:  
      RIGHT Current Medications:   
Current Outpatient Medications:  
  amLODIPine (NORVASC) 10 mg tablet, Take 1 Tab by mouth daily. , Disp: 90 Tab, Rfl: 1   raNITIdine (ZANTAC) 150 mg tablet, Take 1 Tab by mouth two (2) times a day., Disp: 60 Tab, Rfl: 0 
  multivitamin (ONE A DAY) tablet, Take 1 Tab by mouth daily. Indications: VITAMIN DEFICIENCY PREVENTION, Disp: , Rfl:  
  OTHER, daily. Black Seed oil, Disp: , Rfl:  
  sertraline (ZOLOFT) 25 mg tablet, Take 25 mg by mouth daily. Take / use AM day of surgery  per anesthesia protocols. Indications: Generalized Anxiety Disorder, Disp: , Rfl:   
 
Date Last Reviewed:  1/10/2019 Ambulatory/Rehab Services H2 Model Falls Risk Assessment Risk Factors: 
     No Risk Factors Identified Ability to Rise from Chair: 
     (0)  Ability to rise in a single movement Falls Prevention Plan: No modifications necessary Total: (5 or greater = High Risk): 0  
 ©2010 [facility] [address] [place] [address] States Patent #[id number]. Federal Law prohibits the replication, distribution or use without written permission from [facility] [employer] Number of Personal Factors/Comorbidities that affect the Plan of Care: 0: LOW COMPLEXITY EXAMINATION:  
Observation/Orthostatic Postural Assessment:   
      Overweight, rounded shoulders and forward head Mental Status: WFL Palpation:   
      Increased muscle tone in upper traps, levator, rhomboids Sensation:  
      Light tough: WFL; Proprioception: [place] [place] Skin Integrity:   
      intact Vision:   
      NT Special Tests:   
      Decrease in hand burning with manual distraction. No increase in symptoms with compression Coordination: 
     NT Balance:   
      NT Lower Extremity: grossly 4+/5 Strength PROM Action R L R  L   
 Hip Flexion Hip Extension Hip Abduction Hip Adduction Knee Flexion Knee Extension Dorsi Flexion Plantar Flexion Inversion Eversion Cervical ROM: 
Rotation [facility] Sidebending: R more limited than L Extension: 70% Flexion 80% Upper Extremity:  
      B shoulder flexion: 4/5 B shoulder abduction 4+/5 B elbow flexion 4+/5 B elbow extension 4+/5 MMT grossly symmetrical 
Functional Mobility:  
      Gait/Ambulation:  functional 
      Transfers: Mod I Bed Mobility:  Mod I Stairs:  NT Wheelchair:  NT Body Structures Involved: 1. Nerves 2. Bones 3. Joints 4. Muscles Body Functions Affected: 1. Sensory/Pain 2. Neuromusculoskeletal 
3. Movement Related Activities and Participation Affected: 1. Mobility 2. Self Care 3. Domestic Life 4. Interpersonal Interactions and Relationships Number of elements that affect the Plan of Care: 4+: HIGH COMPLEXITY CLINICAL PRESENTATION:  
Presentation: Stable and uncomplicated: LOW COMPLEXITY CLINICAL DECISION MAKING:  
Outcome Measure: Tool Used: Neck Disability Index (NDI) Score:  Initial: 20/50  Most Recent: X/50 (Date: -- ) Interpretation of Score: The Neck Disability Index is a revised form of the Oswestry Low Back Pain Index and is designed to measure the activities of daily living in person's with neck pain. Each section is scored on a 0-5 scale, 5 representing the greatest disability. The scores of each section are added together for a total score of 50. Score 0 1-10 11-20 21-30 31-40 41-49 50 Modifier CH CI CJ CK CL CM CN Medical Necessity:  
· Patient is expected to demonstrate progress in strength, range of motion and functional technique to decrease pain with ADLs and IADLs. · Patient demonstrates good rehab potential due to higher previous functional level. Reason for Services/Other Comments: · Patient continues to require modification of therapeutic interventions to increase complexity of exercises. Use of outcome tool(s) and clinical judgement create a POC that gives a: Questionable prediction of patient's progress: MODERATE COMPLEXITY  
TREATMENT:  
(In addition to Assessment/Re-Assessment sessions the following treatments were rendered) Pre Treatment Symptoms: Patient reports pain 5/10 today. Still cant lift anything to heavy or it hurts. Felt some better after last visit. More loose. Therapeutic Exercise: ( 15 minutes):  Exercises per grid below to improve mobility and strength. Required minimal visual, verbal and tactile cues to promote proper body alignment, promote proper body posture, promote proper body mechanics and promote proper body breathing techniques. Progressed range, repetitions and complexity of movement as indicated. Date: 
1/10/19 Date: 
 Date: Activity/Exercise Parameters Parameters Parameters Scapular retraction X 10 reps with 5 second hold Corner pectoralis stretch 5 x 15 second hold Instruction in proper posture and sitting posture with arms supported to help decrease tightness in upper traps and neck with domeonstration 10 minutes MANUAL THERAPY: (30 minutes): patient prone with pillows under feet for STM with hands and Guasha tool to left more than right upper traps, neck, upper cervical area, scapular border and upper back. Patient then sitting with arms supported for STM with Guasha tool to bilateral upper traps, upper back, and bilateral neck as well as, bilateral lateral upper arms and all to help decrease soft tissue tightness and muscular tightness and help improve mobility. Modalities: (8-10 minutes) Patient sitting with arms supported for ice pack to bilateral upper trap, neck and upper back. Patient instructed in home use of ice on and off during the day for 10 minutes at a time. Patient understood and agreeable. Treatment/Session Assessment:   
· Pain/ Symptoms: Initial:   5/10 Post Session: Patient reported much more loose and feeling much better after treatment. No number given. ·  
Compliance with Program/Exercises: Will assess as treatment progresses. · Recommendations/Intent for next treatment session: \"Next visit will focus on advancements to more challenging activities and reduction in assistance provided\". Total Treatment Duration: PT Patient Time In/Time Out Time In: 1430 Time Out: 1525 Perla Roberts, BERNARDO

## 2019-01-14 ENCOUNTER — HOSPITAL ENCOUNTER (OUTPATIENT)
Dept: PHYSICAL THERAPY | Age: 39
Discharge: HOME | End: 2019-01-14
Payer: COMMERCIAL

## 2019-01-14 PROCEDURE — 97140 MANUAL THERAPY 1/> REGIONS: CPT

## 2019-01-14 NOTE — THERAPY EVALUATION
Franck La : 1980 Primary: [facility] Secondary:  Therapy Center at [facility] [address], [address] [facility], [place], [facility] [phone]   [phone] OUTPATIENT PHYSICAL THERAPY:Daily Note 2019 ICD-10: Treatment Diagnosis: Cervicalgia (M54.2) Precautions/Allergies:  
Lisinopril; Lorabid [loracarbef]; and Lortab [hydrocodone-acetaminophen] MD Orders: evaluate and treat. Traction  MEDICAL/REFERRING DIAGNOSIS: 
Radiculopathy, cervical region [M54.12] Cervicalgia [M54.2] DATE OF ONSET: 1 month ago. REFERRING PHYSICIAN: Leopold Lamp, [place] RETURN PHYSICIAN APPOINTMENT:    
ASSESSMENT (Date: 19):  Ms. Mana Gonzalez presents to PT with complaints of pain and weakness in B UE. She demonstrated some weakness of shoulder flexion bilaterally but it was symmetrical. light touch sensation was [place] [place]. She has some limited ROM in her cervical spine. She had some minor relief with manual traction of the cervical spine. She would benefit from skilled PT to address the problem list and goals below. PROBLEM LIST (Impacting functional limitations): 1. Decreased Strength 2. Decreased ADL/Functional Activities 3. Increased Pain 4. Decreased Flexibility/Joint Mobility 5. Decreased [place] with Home Exercise Program INTERVENTIONS PLANNED: 
1. Cold 2. Electrical Stimulation 3. Heat 4. Home Exercise Program (HEP) 5. Manual Therapy 6. Neuromuscular Re-education/Strengthening 7. Range of Motion (ROM) 8. Therapeutic Activites 9. Therapeutic Exercise/Strengthening 10. traction TREATMENT PLAN: 
Effective Dates: 2019 TO 2019 (30 days). Frequency/Duration: 2 times a week for 30 Days GOALS: (Goals have been discussed and agreed upon with patient.) Discharge Goals: Time Frame: 30 days 1. Patient will be independent with HEP. 2. Patient will have a decrease on the NDI to 10 indicating a reduction in symptoms. 3. Patient will verbalize work place modification to improve her posture and decrease recurring symptoms. 4. Patient will report pain no greater than 3/10 in 24 hours indicating improved ability to complete ADLs and IADLs. Rehabilitation Potential For Stated Goals: Good Regarding Kaylyn Medina's therapy, I certify that the treatment plan above will be carried out by a therapist or under their direction. Thank you for this referral, 
Aparna Olivares, PTA, NCS    
  
    
 
 
HISTORY:  
Patient Stated Goal: 
      Less pain History of Present Injury/Illness (Reason for Referral): 
Patient reports neck pain that started on her R side. She says that it started as arthritis pain but now she feels like she has weakness in her R arm. If she lies on 1 side too long, she goes numb. No MRI, patient reports x-ray was negative. Pain is worse by the end of the day. The pain is now down the left and the right arm. She likes to sleep on her R side but she has been having to sleep on her back. 1 pillow under her head. Past Medical History/Comorbidities: Ms. Jacky Whaley  has a past medical history of DVT of leg (deep venous thrombosis) ([address] Encounter for insertion of mirena IUD, GERD (gastroesophageal reflux disease), H/O seasonal allergies, Hypertension, Hypertension, Irregular periods, Menstrual migraine without status migrainosus, not intractable, Migraines, Morbid obesity ([address] Psychiatric disorder, Pulmonary embolus ([address] and Sleep disorder. Ms. Jacky Whaley  has a past surgical history that includes hx cholecystectomy (2000); pr biopsy of breast, incisional; pr biopsy of breast, needle core; hx tonsillectomy (N/A); hx gyn (11/09/2017); hx breast biopsy (Bilateral); and ROBOTIC ASSISTED HYSTERECTOMY WITH BILATERAL SALPINGECTOMY, CYSTOSCOPY (N/A, 11/9/2017). Per medical history questionnaire/therapist interview: no other PMI Social History/Living Environment: lives with someone. No assistance with ADLs. Drives. Prior Level of Function/Work/Activity: 
Works at a computer all day. Dominant Side:  
      RIGHT Current Medications:   
Current Outpatient Medications:  
  amLODIPine (NORVASC) 10 mg tablet, Take 1 Tab by mouth daily. , Disp: 90 Tab, Rfl: 1   raNITIdine (ZANTAC) 150 mg tablet, Take 1 Tab by mouth two (2) times a day., Disp: 60 Tab, Rfl: 0 
  multivitamin (ONE A DAY) tablet, Take 1 Tab by mouth daily. Indications: VITAMIN DEFICIENCY PREVENTION, Disp: , Rfl:  
  OTHER, daily. Black Seed oil, Disp: , Rfl:  
  sertraline (ZOLOFT) 25 mg tablet, Take 25 mg by mouth daily. Take / use AM day of surgery  per anesthesia protocols. Indications: Generalized Anxiety Disorder, Disp: , Rfl:   
 
Date Last Reviewed:  1/14/2019 Ambulatory/Rehab Services H2 Model Falls Risk Assessment Risk Factors: 
     No Risk Factors Identified Ability to Rise from Chair: 
     (0)  Ability to rise in a single movement Falls Prevention Plan: No modifications necessary Total: (5 or greater = High Risk): 0  
 ©2010 [facility] [address] [place] [address] States Patent #[id number]. Federal Law prohibits the replication, distribution or use without written permission from [facility] [employer] Number of Personal Factors/Comorbidities that affect the Plan of Care: 0: LOW COMPLEXITY EXAMINATION:  
Observation/Orthostatic Postural Assessment:   
      Overweight, rounded shoulders and forward head Mental Status: WFL Palpation:   
      Increased muscle tone in upper traps, levator, rhomboids Sensation:  
      Light tough: WFL; Proprioception: [place] [place] Skin Integrity:   
      intact Vision:   
      NT Special Tests:   
      Decrease in hand burning with manual distraction. No increase in symptoms with compression Coordination: 
     NT Balance:   
      NT Lower Extremity: grossly 4+/5 Strength PROM Action R L R  L   
 Hip Flexion Hip Extension Hip Abduction Hip Adduction Knee Flexion Knee Extension Dorsi Flexion Plantar Flexion Inversion Eversion Cervical ROM: 
Rotation [facility] Sidebending: R more limited than L Extension: 70% Flexion 80% Upper Extremity:  
      B shoulder flexion: 4/5 B shoulder abduction 4+/5 B elbow flexion 4+/5 B elbow extension 4+/5 MMT grossly symmetrical 
Functional Mobility:  
      Gait/Ambulation:  functional 
      Transfers: Mod I Bed Mobility:  Mod I Stairs:  NT Wheelchair:  NT Body Structures Involved: 1. Nerves 2. Bones 3. Joints 4. Muscles Body Functions Affected: 1. Sensory/Pain 2. Neuromusculoskeletal 
3. Movement Related Activities and Participation Affected: 1. Mobility 2. Self Care 3. Domestic Life 4. Interpersonal Interactions and Relationships Number of elements that affect the Plan of Care: 4+: HIGH COMPLEXITY CLINICAL PRESENTATION:  
Presentation: Stable and uncomplicated: LOW COMPLEXITY CLINICAL DECISION MAKING:  
Outcome Measure: Tool Used: Neck Disability Index (NDI) Score:  Initial: 20/50  Most Recent: X/50 (Date: -- ) Interpretation of Score: The Neck Disability Index is a revised form of the Oswestry Low Back Pain Index and is designed to measure the activities of daily living in person's with neck pain. Each section is scored on a 0-5 scale, 5 representing the greatest disability. The scores of each section are added together for a total score of 50. Score 0 1-10 11-20 21-30 31-40 41-49 50 Modifier CH CI CJ CK CL CM CN Medical Necessity:  
· Patient is expected to demonstrate progress in strength, range of motion and functional technique to decrease pain with ADLs and IADLs. · Patient demonstrates good rehab potential due to higher previous functional level. Reason for Services/Other Comments: · Patient continues to require modification of therapeutic interventions to increase complexity of exercises. Use of outcome tool(s) and clinical judgement create a POC that gives a: Questionable prediction of patient's progress: MODERATE COMPLEXITY  
TREATMENT:  
(In addition to Assessment/Re-Assessment sessions the following treatments were rendered) Pre Treatment Symptoms: Patient reports pain 4/10 today. Patient reports no numbness in hands until last night since last visit. Patient reports tightness decreased after last visit. Patient states she wishes she understood why she has so much tightness so she could do something to prevent the tightness. Therapeutic Exercise: ( 15 minutes):  Exercises per grid below to improve mobility and strength. Required minimal visual, verbal and tactile cues to promote proper body alignment, promote proper body posture, promote proper body mechanics and promote proper body breathing techniques. Progressed range, repetitions and complexity of movement as indicated. Date: 
1/10/19 Date: 
 Date: Activity/Exercise Parameters Parameters Parameters Scapular retraction X 10 reps with 5 second hold Corner pectoralis stretch 5 x 15 second hold Instruction in proper posture and sitting posture with arms supported to help decrease tightness in upper traps and neck with domeonstration 10 minutes Patient issued written handout of exercises as noted in flow sheet for HEP. Verbally reviewed exercises. Patient stated good understanding of exercises. Patient instructed to use ice at home 10 minutes at a time as needed, 3-4 times a day. MANUAL THERAPY: (45 minutes): patient prone with pillows under feet for STM with hands and Guasha tool to left more than right upper traps, neck, upper cervical area, scapular border and upper back and bilateral upper arms laterally.  Then STM with therapeutic metal bar with rolling technique to upper back. Patient then sitting for STM and release to bilateral upper traps with therapeutic bar followed by rolling technique all to help decrease soft tissue tightness and muscular tightness and help improve mobility and decrease pain. Modalities: (10 minutes) Patient supine with arms supported for moist heat to back and left upper trap with knee wedge in place prior to manual therapy. Treatment/Session Assessment:  Patient tolerated treatment well with reports of decreased tightness and pain and decreased tenderness. Patient with noticeable decreased palpable tightness at beginning of treatment and less at end of treatment. Good response to treatment. Attempted NuStep today but pain increased in left upper back so stopped. Will continue to focus on manual to decrease tightness and pain and with stretching exercise and posture exercises. Continue per plan of care. · Pain/ Symptoms: Initial:   4/10 Post Session: 0/10  · Compliance with Program/Exercises: Will assess as treatment progresses. · Recommendations/Intent for next treatment session: \"Next visit will focus on advancements to more challenging activities and reduction in assistance provided\". Total Treatment Duration: PT Patient Time In/Time Out Time In: 1600 Time Out: 7200 Prince Kehr, PTA

## 2019-01-16 ENCOUNTER — HOSPITAL ENCOUNTER (OUTPATIENT)
Dept: PHYSICAL THERAPY | Age: 39
Discharge: HOME | End: 2019-01-16
Payer: COMMERCIAL

## 2019-01-16 PROCEDURE — 97140 MANUAL THERAPY 1/> REGIONS: CPT

## 2019-01-16 NOTE — PROGRESS NOTES
Perla Calle : 1980 Primary: [facility]* Secondary:  Therapy Center at [facility] [address], [address] [facility], [place] [address] [address] [facility] [phone]   [phone] OUTPATIENT PHYSICAL THERAPY:Daily Note 2019 ICD-10: Treatment Diagnosis: Cervicalgia (M54.2) Precautions/Allergies:  
Lisinopril; Lorabid [loracarbef]; and Lortab [hydrocodone-acetaminophen] MD Orders: evaluate and treat. Traction  MEDICAL/REFERRING DIAGNOSIS: 
Radiculopathy, cervical region [M54.12] Cervicalgia [M54.2] DATE OF ONSET: 1 month ago. REFERRING PHYSICIAN: Miroslava Contreras, [address] 
RETURN PHYSICIAN APPOINTMENT:    
ASSESSMENT (Date: 19):  Ms. Stefano Fleischer presents to PT with complaints of pain and weakness in B UE. She demonstrated some weakness of shoulder flexion bilaterally but it was symmetrical. light touch sensation was [place] [place]. She has some limited ROM in her cervical spine. She had some minor relief with manual traction of the cervical spine. She would benefit from skilled PT to address the problem list and goals below. PROBLEM LIST (Impacting functional limitations): 1. Decreased Strength 2. Decreased ADL/Functional Activities 3. Increased Pain 4. Decreased Flexibility/Joint Mobility 5. Decreased [place] with Home Exercise Program INTERVENTIONS PLANNED: 
1. Cold 2. Electrical Stimulation 3. Heat 4. Home Exercise Program (HEP) 5. Manual Therapy 6. Neuromuscular Re-education/Strengthening 7. Range of Motion (ROM) 8. Therapeutic Activites 9. Therapeutic Exercise/Strengthening 10. traction TREATMENT PLAN: 
Effective Dates: 2019 TO 2019 (30 days). Frequency/Duration: 2 times a week for 30 Days GOALS: (Goals have been discussed and agreed upon with patient.) Discharge Goals: Time Frame: 30 days 1. Patient will be independent with HEP. 2. Patient will have a decrease on the NDI to 10 indicating a reduction in symptoms. 3. Patient will verbalize work place modification to improve her posture and decrease recurring symptoms. 4. Patient will report pain no greater than 3/10 in 24 hours indicating improved ability to complete ADLs and IADLs. Rehabilitation Potential For Stated Goals: Good Regarding Kaylyn MALLORY Duquerd's therapy, I certify that the treatment plan above will be carried out by a therapist or under their direction. Thank you for this referral, 
Prince Kehr, PTA, NCS    
  
    
 
 
HISTORY:  
Patient Stated Goal: 
      Less pain History of Present Injury/Illness (Reason for Referral): 
Patient reports neck pain that started on her R side. She says that it started as arthritis pain but now she feels like she has weakness in her R arm. If she lies on 1 side too long, she goes numb. No MRI, patient reports x-ray was negative. Pain is worse by the end of the day. The pain is now down the left and the right arm. She likes to sleep on her R side but she has been having to sleep on her back. 1 pillow under her head. Past Medical History/Comorbidities: Ms. Sara Jaeger  has a past medical history of DVT of leg (deep venous thrombosis) ([address] Encounter for insertion of mirena IUD (01/03/2016), GERD (gastroesophageal reflux disease), H/O seasonal allergies, Hypertension, Hypertension, Irregular periods, Menstrual migraine without status migrainosus, not intractable (10/19/2015), Migraines, Morbid obesity ([address] Psychiatric disorder, Pulmonary embolus ([address] and Sleep disorder.  She also has no past medical history of Aneurysm ([address] Arrhythmia, Arthritis, Asthma, Autoimmune disease ([address] CAD (coronary artery disease), Cancer ([address] Chronic kidney disease, Chronic obstructive pulmonary disease ([address] Chronic pain, Coagulation disorder ([address] Diabetes ([address] Difficult intubation, Endocarditis, Heart failure ([address] Liver disease, Malignant hyperthermia due to anesthesia, Nausea & vomiting, Nicotine vapor product user, Non-nicotine vapor product user, Pseudocholinesterase deficiency, PUD (peptic ulcer disease), Rheumatic fever, Seizures ([facility] Sleep apnea, Stroke [facility]), or Thyroid disease. Ms. Sadi Rahman  has a past surgical history that includes hx cholecystectomy (2000); pr biopsy of breast, incisional; pr biopsy of breast, needle core; hx tonsillectomy (N/A); hx gyn (11/09/2017); and hx breast biopsy (Bilateral). Per medical history questionnaire/therapist interview: no other PMI Social History/Living Environment:  
  lives with someone. No assistance with ADLs. Drives. Prior Level of Function/Work/Activity: 
Works at a computer all day. Dominant Side:  
      RIGHT Current Medications:   
Current Outpatient Medications:  
  amLODIPine (NORVASC) 10 mg tablet, Take 1 Tab by mouth daily. , Disp: 90 Tab, Rfl: 1   raNITIdine (ZANTAC) 150 mg tablet, Take 1 Tab by mouth two (2) times a day., Disp: 60 Tab, Rfl: 0 
  multivitamin (ONE A DAY) tablet, Take 1 Tab by mouth daily. Indications: VITAMIN DEFICIENCY PREVENTION, Disp: , Rfl:  
  OTHER, daily. Black Seed oil, Disp: , Rfl:  
  sertraline (ZOLOFT) 25 mg tablet, Take 25 mg by mouth daily. Take / use AM day of surgery  per anesthesia protocols. Indications: Generalized Anxiety Disorder, Disp: , Rfl:   
 
Date Last Reviewed:  1/16/2019 Ambulatory/Rehab Services H2 Model Falls Risk Assessment Risk Factors: 
     No Risk Factors Identified Ability to Rise from Chair: 
     (0)  Ability to rise in a single movement Falls Prevention Plan: No modifications necessary Total: (5 or greater = High Risk): 0  
 ©2010 [facility] [address] [place] [address] States Patent #[id number]. Federal Law prohibits the replication, distribution or use without written permission from [facility] [email or web address] Number of Personal Factors/Comorbidities that affect the Plan of Care: 0: LOW COMPLEXITY EXAMINATION:  
 Observation/Orthostatic Postural Assessment:   
      Overweight, rounded shoulders and forward head Mental Status: WFL Palpation:   
      Increased muscle tone in upper traps, levator, rhomboids Sensation:  
      Light tough: WFL; Proprioception: [facility] Skin Integrity:   
      intact Vision:   
      NT Special Tests:   
      Decrease in hand burning with manual distraction. No increase in symptoms with compression Coordination: 
     NT Balance:   
      NT Lower Extremity: grossly 4+/5 Strength PROM Action R L R  L Hip Flexion Hip Extension Hip Abduction Hip Adduction Knee Flexion Knee Extension Dorsi Flexion Plantar Flexion Inversion Eversion Cervical ROM: 
Rotation [facility] Sidebending: R more limited than L Extension: 70% Flexion 80% Upper Extremity:  
      B shoulder flexion: 4/5 B shoulder abduction 4+/5 B elbow flexion 4+/5 B elbow extension 4+/5 MMT grossly symmetrical 
Functional Mobility:  
      Gait/Ambulation:  functional 
      Transfers: Mod I Bed Mobility:  Mod I Stairs:  NT Wheelchair:  NT Body Structures Involved: 1. Nerves 2. Bones 3. Joints 4. Muscles Body Functions Affected: 1. Sensory/Pain 2. Neuromusculoskeletal 
3. Movement Related Activities and Participation Affected: 1. Mobility 2. Self Care 3. Domestic Life 4. Interpersonal Interactions and Relationships Number of elements that affect the Plan of Care: 4+: HIGH COMPLEXITY CLINICAL PRESENTATION:  
Presentation: Stable and uncomplicated: LOW COMPLEXITY CLINICAL DECISION MAKING:  
Outcome Measure: Tool Used: Neck Disability Index (NDI) Score:  Initial: 20/50  Most Recent: X/50 (Date: -- ) Interpretation of Score:  The Neck Disability Index is a revised form of the Oswestry Low Back Pain Index and is designed to measure the activities of daily living in person's with neck pain. Each section is scored on a 0-5 scale, 5 representing the greatest disability. The scores of each section are added together for a total score of 50. Score 0 1-10 11-20 21-30 31-40 41-49 50 Modifier CH CI CJ CK CL CM CN Medical Necessity:  
· Patient is expected to demonstrate progress in strength, range of motion and functional technique to decrease pain with ADLs and IADLs. · Patient demonstrates good rehab potential due to higher previous functional level. Reason for Services/Other Comments: 
· Patient continues to require modification of therapeutic interventions to increase complexity of exercises. Use of outcome tool(s) and clinical judgement create a POC that gives a: Questionable prediction of patient's progress: MODERATE COMPLEXITY  
TREATMENT:  
(In addition to Assessment/Re-Assessment sessions the following treatments were rendered) Pre Treatment Symptoms: Patient reports pain 2/10 today and reports she is doing much better pain wise but numbness in hands remains about the same. Although last visit patient did report that she had had less numbness for several days. Patient reports she is seeing her MD this afternoon and will discuss if she should  have an MRI and if she should continue with therapy. Therapeutic Exercise: ( 15 minutes):  Exercises per grid below to improve mobility and strength. Required minimal visual, verbal and tactile cues to promote proper body alignment, promote proper body posture, promote proper body mechanics and promote proper body breathing techniques. Progressed range, repetitions and complexity of movement as indicated. Date: 
1/10/19 Date: 
 Date: Activity/Exercise Parameters Parameters Parameters Scapular retraction X 10 reps with 5 second hold Corner pectoralis stretch 5 x 15 second hold Instruction in proper posture and sitting posture with arms supported to help decrease tightness in upper traps and neck with domeonstration 10 minutes Patient issued written handout of exercises as noted in flow sheet for HEP. Verbally reviewed exercises. Patient stated good understanding of exercises. Patient instructed to use ice at home 10 minutes at a time as needed, 3-4 times a day. Modalities: (total 20 minutes) Patient sitting with arms supported for moist heat to bilateral upper traps prior to manual therapy. to help increase blood flow. Following manual ice packs to bilateral upper traps, neck and upper arms to help decrease pain. MANUAL THERAPY: (40 minutes): patient sitting with arms support for STM with Guasha tool and hands to bilateral upper arms, upper traps and neck to help decrease tightness and pain and improve soft tissue mobility. Treatment/Session Assessment:  Patient with good tolerance to treatment and reports that her pain level is generally down from when she first started her therapy. But, she continues to have numbness in hands and this remains about the same. Will add to exercises and begin to focus on posture and postural exercises next visit. Patient to MD today. Continue per plan of care. · Pain/ Symptoms: Initial:   2/10 Post Session: 0/10  · Compliance with Program/Exercises: Will assess as treatment progresses. · Recommendations/Intent for next treatment session: \"Next visit will focus on advancements to more challenging activities and reduction in assistance provided\". Total Treatment Duration: PT Patient Time In/Time Out Time In: 1115 Time Out: 0170 Jane Lewis, PTA

## 2019-03-06 NOTE — PROGRESS NOTES
Anali Brennan  : 1980  Primary: Manuel [facility] [place]  Secondary:  [facility] at [facility], [address] [facility], [place] [address] [address] [facility] [place]  [phone]   [phone]         OUTPATIENT PHYSICAL THERAPY:Daily Note 2019    ICD-10: Treatment Diagnosis: Cervicalgia (M54.2)  Precautions/Allergies:   Lisinopril; Lorabid [loracarbef]; and Lortab [hydrocodone-acetaminophen]   MD Orders: evaluate and treat. Traction  MEDICAL/REFERRING DIAGNOSIS:  Radiculopathy, cervical region [M54.12]  Cervicalgia [M54.2]   DATE OF ONSET: 1 month ago. REFERRING PHYSICIAN: Leif Reinoso  RETURN PHYSICIAN APPOINTMENT:       ASSESSMENT (Date: 3/6/19): Anali Brennan has been seen in physical therapy from 19 to 19 for 5 visits. Treatment has been discontinued at this time due to patient returning to the MD  and patient failing to return for additional treatment. The below goals were met prior to discontinuation. Some goals were not met due to not being reassessed. Thank you for this referral.        PROBLEM LIST (Impacting functional limitations):  1. Decreased Strength  2. Decreased ADL/Functional Activities  3. Increased Pain  4. Decreased Flexibility/Joint Mobility  5. Decreased [place] with Home Exercise Program INTERVENTIONS PLANNED:  1. Cold  2. Electrical Stimulation  3. Heat  4. Home Exercise Program (HEP)  5. Manual Therapy  6. Neuromuscular Re-education/Strengthening  7. Range of Motion (ROM)  8. Therapeutic Activites  9. Therapeutic Exercise/Strengthening  10. traction    TREATMENT PLAN:  Effective Dates: 2019 TO 2019 (30 days). Frequency/Duration: 2 times a week for 30 Days  GOALS: (Goals have been discussed and agreed upon with patient.)  Discharge Goals: Time Frame: 30 days- NOT MET  1. Patient will be independent with HEP. 2. Patient will have a decrease on the NDI to 10 indicating a reduction in symptoms.     3. Patient will verbalize work place modification to improve her posture and decrease recurring symptoms. 4. Patient will report pain no greater than 3/10 in 24 hours indicating improved ability to complete ADLs and IADLs. Rehabilitation Potential For Stated Goals: Good  Regarding Kaylyn Medina's therapy, I certify that the treatment plan above will be carried out by a therapist or under their direction.   Thank you for this referral,  Jensen Thopmson, MATTI, NCS

## 2019-03-28 NOTE — PROGRESS NOTES
Melia Mercedes  : 1980  Payor: Aicha Barnes / Plan: [place] [facility] / Product Type: PPO /  [id number]  at [facility] [place]  [address] [facility], [address] [place] [address] [address]  [phone]   [phone]       OUTPATIENT PHYSICAL THERAPY:Discontinuation Summary 3/28/2019     ICD-10: Treatment Diagnosis: Pain in right knee (M25.561)  Precautions/Allergies:   Lisinopril; Lorabid [loracarbef]; and Lortab [hydrocodone-acetaminophen]   Fall Risk Score: 0 (? 5 = High Risk)  MD Orders: evaluate and treat MEDICAL/REFERRING DIAGNOSIS:  R knee pain   DATE OF ONSET: 2.5 weeks ago  REFERRING PHYSICIAN: Sissy Lawson MD  RETURN PHYSICIAN APPOINTMENT: TBD     Melia Mercedes has been seen in physical therapy from 18 to 18 for 16 visits. Treatment has been discontinued at this time due to patient making good progress with therapy with significant symptom reduction, and receipt of home exercise program for syptom management. The below goals were met prior to discontinuation. Some goals were not met due to balance deficits in single limb stance and patient not resuming previous level of exercise participation. Thank you for this referral.          GOALS: (Goals have been discussed and agreed upon with patient.)   Short-Term Functional Goals: Time Frame  1. Report no more than minimal, intermittent 0-1/10 pain R knee with basic walking and ADL's, and score greater than 50/80 on the LEFS. MET 18  2. R quad strength is 5/5 with good terminal extension strength and no pain for stability with walking and progressing into strengthening phase. MET per absence of quad lag with straight leg raises 18  3. Walking without limp on level surfaces and up stairs. MET 18  Discharge Goals: Time Frame  1. No significant pain R knee with all normalized daily home and work activities, and score greater than 60/80 on the LEFS. MET 18  2.  Demonstrate R knee ROM equal to the L, and no pain when ambulating on treadmill x 30 minutes. Not met   3. Able to balance with good control in single-leg stand greater than 15 seconds with eyes open, greater than 10 seconds with eyes closed for improved dynamic stability. Not met  4. Independent with HEP for advanced knee strengthening. The information in this section was collected on 6-14-18 (except where otherwise noted). HISTORY:   History of Present Injury/Illness (Reason for Referral):  Patient reports that she began experiencing a sharp pain in her R knee when ambulating for exercise on an inclined treadmill. Patient has had to cease her walking due to this knee pain and sought medical attention due to the increased pain when ambulating. Past Medical History/Comorbidities: Ms. Ramon Mc  has a past medical history of DVT of leg (deep venous thrombosis) ([address] Encounter for insertion of mirena IUD (01/03/2016), GERD (gastroesophageal reflux disease), H/O seasonal allergies, Hypertension, Hypertension, Irregular periods, Menstrual migraine without status migrainosus, not intractable (10/19/2015), Migraines, Morbid obesity ([address] Psychiatric disorder, Pulmonary embolus ([address] and Sleep disorder. She also has no past medical history of Aneurysm ([address] Arrhythmia, Arthritis, Asthma, Autoimmune disease ([address] CAD (coronary artery disease), Cancer ([address] Chronic kidney disease, Chronic obstructive pulmonary disease ([address] Chronic pain, Coagulation disorder ([address] Diabetes ([address] Difficult intubation, Endocarditis, Heart failure ([address] Liver disease, Malignant hyperthermia due to anesthesia, Nausea & vomiting, Nicotine vapor product user, Non-nicotine vapor product user, Pseudocholinesterase deficiency, PUD (peptic ulcer disease), Rheumatic fever, Seizures ([address] Sleep apnea, Stroke ([address] or Thyroid disease.   Ms. Ramon Mc  has a past surgical history that includes hx cholecystectomy (2000); pr biopsy of breast, incisional; pr biopsy of breast, needle core; hx tonsillectomy (N/A); hx gyn (11/09/2017); and hx breast biopsy (Bilateral). Social History/Living Environment: Patient lives with family in a two-story apartment which is on the first floor. 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 She was exercising 30 minutes at [employer] prior to symptom onset. Current Medications:       Current Outpatient Prescriptions:     ibuprofen (MOTRIN) 800 mg tablet, Take 1 Tab by mouth every eight (8) hours as needed for Pain., Disp: 30 Tab, Rfl: 0    multivitamin (ONE A DAY) tablet, Take 1 Tab by mouth daily. Indications: VITAMIN DEFICIENCY PREVENTION, Disp: , Rfl:     OTHER, daily. Black Seed oil, Disp: , Rfl:     Was put on blood pressure medicine last week (9-24-18)   Date Last Reviewed:  9-24-18   EXAMINATION:   9-24-18  Observation/Orthostatic Postural Assessment: B genu valgus and bilateral pronation noted when standing and ambulating    Palpation: Mildly increased tenderness to medial R knee. ROM:                 R LE 4 deg hyperextension to 115 degrees      Strength:   n/t   n/t      Special Tests: none performed  Neurological Screen: not tested  Functional Mobility:  Independent with all mobility. No antalgic gait pattern. Balance:  Not assessed           Body Structures Involved:  1. Nerves  2. Bones  3. Joints  4. Muscles Body Functions Affected:  1. Sensory/Pain  2. Neuromusculoskeletal  3. Movement Related Activities and Participation Affected:  1. Mobility  2. Interpersonal Interactions and Relationships  3. Community, Social and Civic Life   CLINICAL PRESENTATION:   CLINICAL DECISION MAKING:   Outcome Measure:    Tool Used: Lower Extremity Functional Scale (LEFS)  Score:  Initial: 31/80  60/80 (Date: 7-16-18 ) Most recent: 72/80 (9-24-18)   Interpretation of Score: 20 questions each scored on a 5 point scale with 0 representing \"extreme difficulty or unable to perform\" and 4 representing \"no difficulty\". The lower the score, the greater the functional disability. 80/80 represents no disability. Minimal detectable change is 9 points.

## 2019-07-24 ENCOUNTER — HOSPITAL ENCOUNTER (OUTPATIENT)
Dept: MAMMOGRAPHY | Age: 39
Discharge: HOME | End: 2019-07-24
Attending: NURSE PRACTITIONER
Payer: COMMERCIAL

## 2019-07-24 DIAGNOSIS — Z12.31 SCREENING MAMMOGRAM, ENCOUNTER FOR: ICD-10-CM

## 2019-07-24 PROCEDURE — 77063 BREAST TOMOSYNTHESIS BI: CPT

## 2019-08-05 ENCOUNTER — HOSPITAL ENCOUNTER (OUTPATIENT)
Dept: MAMMOGRAPHY | Age: 39
Discharge: HOME | End: 2019-08-05
Attending: NURSE PRACTITIONER
Payer: COMMERCIAL

## 2019-08-05 DIAGNOSIS — R92.8 ABNORMAL SCREENING MAMMOGRAM: ICD-10-CM

## 2019-08-05 DIAGNOSIS — Z80.3 FH: BREAST CANCER: ICD-10-CM

## 2019-08-05 PROCEDURE — 76642 ULTRASOUND BREAST LIMITED: CPT

## 2019-08-08 ENCOUNTER — HOSPITAL ENCOUNTER (OUTPATIENT)
Dept: MAMMOGRAPHY | Age: 39
Discharge: HOME | End: 2019-08-08
Attending: NURSE PRACTITIONER
Payer: COMMERCIAL

## 2019-08-08 VITALS — SYSTOLIC BLOOD PRESSURE: 140 MMHG | DIASTOLIC BLOOD PRESSURE: 84 MMHG | HEART RATE: 78 BPM

## 2019-08-08 DIAGNOSIS — R92.8 ABNORMAL FINDING ON BREAST IMAGING: ICD-10-CM

## 2019-08-08 DIAGNOSIS — N63.20 BREAST MASS, LEFT: ICD-10-CM

## 2019-08-08 PROCEDURE — 88305 TISSUE EXAM BY PATHOLOGIST: CPT

## 2019-08-08 PROCEDURE — 74011250636 HC RX REV CODE- 250/636: Performed by: NURSE PRACTITIONER

## 2019-08-08 PROCEDURE — 77065 DX MAMMO INCL CAD UNI: CPT

## 2019-08-08 PROCEDURE — 19083 BX BREAST 1ST LESION US IMAG: CPT

## 2019-08-08 RX ORDER — LIDOCAINE HYDROCHLORIDE 10 MG/ML
10 INJECTION INFILTRATION; PERINEURAL
Status: COMPLETED | OUTPATIENT
Start: 2019-08-08 | End: 2019-08-08

## 2019-08-08 RX ADMIN — LIDOCAINE HYDROCHLORIDE 10 ML: 10 INJECTION, SOLUTION INFILTRATION; PERINEURAL at 09:00

## 2020-02-25 PROBLEM — R07.9 CHEST PAIN: Status: ACTIVE | Noted: 2019-10-01

## 2020-02-25 PROBLEM — M53.3 SACROILIAC JOINT PAIN: Status: ACTIVE | Noted: 2019-12-06

## 2020-02-25 PROBLEM — F41.1 ANXIETY STATE: Status: ACTIVE | Noted: 2019-10-01

## 2020-02-27 NOTE — PROGRESS NOTES
Assessment done via doc flow sheet. Pt up in recliner at bedside, resp easy & regular, lungs CTA bilaterally. Abdomen soft, tender, ative bowel sounds, 4 PS with derma bond clean, dry & intact. Pt reports passing flatus while in the shower.   Tolerated regular diet for breakfast. PAIN SCALE 6 OF 10. [As Noted in HPI] : as noted in HPI [Negative] : Heme/Lymph

## 2020-05-21 PROBLEM — N63.20 LEFT BREAST LUMP: Status: ACTIVE | Noted: 2020-05-21

## 2020-05-28 ENCOUNTER — HOSPITAL ENCOUNTER (OUTPATIENT)
Dept: MAMMOGRAPHY | Age: 40
Discharge: HOME | End: 2020-05-28
Attending: OBSTETRICS & GYNECOLOGY
Payer: COMMERCIAL

## 2020-05-28 ENCOUNTER — TELEPHONE (OUTPATIENT)
Dept: MAMMOGRAPHY | Age: 40
End: 2020-05-28

## 2020-05-28 DIAGNOSIS — N63.20 LEFT BREAST LUMP: ICD-10-CM

## 2020-05-28 DIAGNOSIS — N63.24 BREAST LUMP ON LEFT SIDE AT 7 O'CLOCK POSITION: ICD-10-CM

## 2020-05-28 DIAGNOSIS — Z98.890 HISTORY OF BILATERAL BREAST BIOPSY: ICD-10-CM

## 2020-05-28 DIAGNOSIS — Z80.3 FAMILY HISTORY OF BREAST CANCER: ICD-10-CM

## 2020-05-28 PROCEDURE — 77066 DX MAMMO INCL CAD BI: CPT

## 2020-05-28 PROCEDURE — 76642 ULTRASOUND BREAST LIMITED: CPT

## 2020-05-29 NOTE — PROGRESS NOTES
Patient is set up to see Dr. Ashley Douglas on 6/8/2020 at 9:15, for a surgical consult for the left breast fibroadenoma that is growing in size. She is aware of the appointment.

## 2020-06-15 NOTE — H&P (VIEW-ONLY)
Dalia Pineda DO 
[address], [address] [place] Phone [phone]   Fax [phone] Date of visit: 6/15/2020 Primary/Requesting provider: Regine Trevino DO Chief Complaint Patient presents with  New Patient  
  left breast fibroadenoma Name: Edil Blake MRN: [id number] : 1980 Age: 38 y.o. Sex: female PCP: Regine Trevino DO  
 
 
CC:   
Chief Complaint Patient presents with  New Patient  
  left breast fibroadenoma HPI: 
 
 Edil Blake is a 38 y.o. female who presents for evaluation of left breast fibroadenoma. This is a healthy 58-year-old female with recent diagnosis of left breast fibroadenoma biopsy-proven. Patient has had this monitored but it increased in size on her last mammogram and ultrasound. It now measures 1.9 cm. Biopsy revealed fibroadenoma. She reports associated pain and increase in size. She denies any personal history of breast cancer but does report both maternal and paternal grandparents with breast cancer. Grandmother is to be specific. She is here today for evaluation of left breast fibroadenoma and possible surgery. [place] DIAGNOSIS  
A: \"LEFT BREAST, 7:30 POSITION, 11 CM FROM NIPPLE, CORE BIOPSY\":  
FRAGMENTS OF FIBROADENOMA WITH APOCRINE METAPLASIA, COMPLEX  
jtl/2019 Electronically signed out on 2019 06:42 by SERAFIN Qureshi M.D. DIAGNOSTIC DIGITAL BILATERAL MAMMOGRAPHY AND left BREAST ULTRASOUND  
  
CLINICAL INDICATION: Patient reports enlarging palpable lump left lower inner 
quadrant, prior left 7:30 benign biopsy in  demonstrating fibroadenoma, and 
remote right benign biopsy in  at another facility demonstrating 1:00 
fibrosis and fat necrosis. No personal malignancy.  Family history of both 
grandmothers having breast cancer around age 48. 
  
COMPARISON: 2019 and multiple others going back to 2009  
  
 FINDINGS:  
  
Mammogram: Digital diagnostic mammography was performed, and is interpreted in 
conjunction with a computer assisted detection (CAD) system.   
  
Standard views bilaterally demonstrate scattered glandular densities. Additional 
left mediolateral view was performed. There is an enlarging mass in the left 
lower inner quadrant, corresponding to the patient's lump and with internal 
biopsy clip, for which ultrasound is now recommended. Right breast demonstrates 
stable appearance. No suspicious developing mass, calcification, distortion, or 
skin thickening is evident otherwise on either side. 
  
Ultrasound: Real time targeted sonography was performed of the left lower inner 
quadrant by the radiologist and sonographer. Corresponding to the patient's lump 
at 7:30 position 11 cm from the nipple is the solid lobulated heterogeneous 
hypoechoic mass now measuring up to 1.9 x 1.6 x 1.9 cm (previously 1.2 x 0.7 x 
1.2 cm), with prior biopsy demonstrating fibroadenoma on 8/8/2019. There is no 
hypervascularity or abnormal shadowing. Given the new symptoms the patient 
request surgical consultation and excision, and this is appropriate as the 
lesion has enlarged. 
  
Remainder of left lower inner quadrant appears unremarkable. 
  
  
IMPRESSION IMPRESSION:  
  
1. Enlarging and now palpable left breast 7:30 mass, with prior biopsy 
demonstrating benign fibroadenoma. Recommend surgical consultation and 
consideration of excision. 
  
2. No suspicious finding otherwise in either breast. 
  
Results and recommendations discussed in full with the patient at the time of 
interpretation, all questions were answered, and she agrees. 
  
BI-RADS Assessment Category 4: Suspicious Finding- Biopsy should be considered. PMH: 
 
Past Medical History:  
Diagnosis Date  DVT of leg (deep venous thrombosis) ([address] Thought to be secondary to OCP use  Encounter for insertion of mirena IUD 01/03/2016  GERD (gastroesophageal reflux disease)  H/O seasonal allergies  Hypertension   
 past h/o Hypertension. Currently not on medication  Hypertension   
 h/o before 2013, when she had DVT  Irregular periods   
 heavy periods  Menstrual migraine without status migrainosus, not intractable 10/19/2015  Migraines   
 around menstrul cycle  Morbid obesity ([address]  Psychiatric disorder   
 anxiety  Pulmonary embolus [address] Thought to be secondary to OCP use  Sleep disorder   
 problems sleeping PSH: 
 
Past Surgical History:  
Procedure Laterality Date  HX BILATERAL SALPINGECTOMY Bilateral 11/09/2017  HX BREAST BIOPSY Left 08/08/2019  HX BREAST BIOPSY Left 2010  
 pathology benign  HX BREAST BIOPSY Right 2010  
 pathology benign [place]  HX HYSTERECTOMY  11/09/2017 Robotic assisted, Cystoscopy  HX TONSILLECTOMY N/A   
 as a child MEDS: 
 
Current Outpatient Medications Medication Sig  predniSONE (STERAPRED) 5 mg dose pack See administration instruction per 5mg dose pack  albuterol (PROVENTIL HFA, VENTOLIN HFA, PROAIR HFA) 90 mcg/actuation inhaler Take 2 Puffs by inhalation every four (4) hours as needed.  montelukast (SINGULAIR) 10 mg tablet Take 1 Tab by mouth daily.  cetirizine (ZYRTEC) 10 mg tablet Take 1 Tab by mouth daily as needed for Allergies.  fluticasone propionate (FLONASE) 50 mcg/actuation nasal spray 2 Sprays by Both Nostrils route daily.  escitalopram oxalate (LEXAPRO) 10 mg tablet Take 10 mg by mouth.  LORazepam (ATIVAN) 0.5 mg tablet Take 0.5 mg by mouth.  meloxicam (MOBIC) 15 mg tablet Take 15 mg by mouth.  therapeutic multivitamin (THERAGRAN) tablet Take 1 Tab by mouth.  hydroCHLOROthiazide (HYDRODIURIL) 25 mg tablet Take 1 Tab by mouth daily as needed (swelling).  amLODIPine (NORVASC) 10 mg tablet Take 1 Tab by mouth daily.  raNITIdine (ZANTAC) 150 mg tablet Take 1 Tab by mouth two (2) times a day.  OTHER daily. Black Seed oil No current facility-administered medications for this visit. ALLERGIES:   
 
Allergies Allergen Reactions  Lisinopril Other (comments) Intolerance to side effects  Lorabid [Loracarbef] Other (comments) PT IS NOT ALLERGIC  
 Lortab [Hydrocodone-Acetaminophen] Nausea and Vomiting SH: Social History Tobacco Use  Smoking status: Former Smoker Years: 2.00  Smokeless tobacco: Never Used Substance Use Topics  Alcohol use: Yes Comment: occasional, wine  Drug use: No  
 
 
FH: 
 
Family History Problem Relation Age of Onset  Breast Cancer Maternal Grandmother 48  Hypertension Maternal Grandmother  High Cholesterol Mother  Heart Disease Maternal Grandfather  Alzheimer Paternal Grandmother  Breast Cancer Paternal Grandmother 48 Review of Systems Constitutional: Negative. HENT: Negative. Eyes:  
     Wears glasses Respiratory: Negative. Cardiovascular: Negative. Gastrointestinal: Negative. Genitourinary: Negative. Musculoskeletal: Negative. Skin: Negative. Neurological: Negative. Psychiatric/Behavioral: Negative. Physical Exam:  
 
Visit Vitals /89 Pulse 92 Ht 5' 4\" (1.626 m) Wt 289 lb (131.1 kg) LMP 10/01/2017 (Approximate) BMI 49.61 kg/m² Physical Exam 
HENT:  
   Head: Normocephalic and atraumatic. Eyes:  
   Pupils: Pupils are equal, round, and reactive to light. Neck: Musculoskeletal: Normal range of motion and neck supple. Thyroid: No thyromegaly. Trachea: No tracheal deviation. Cardiovascular:  
   Rate and Rhythm: Normal rate and regular rhythm. Heart sounds: Normal heart sounds. No murmur. Pulmonary:  
   Effort: Pulmonary effort is normal. No respiratory distress. Breath sounds: Normal breath sounds. No wheezing or rales. Chest:  
 
 
Abdominal:  
   General: Bowel sounds are normal. There is no distension. Palpations: Abdomen is soft. There is no mass. Tenderness: There is no abdominal tenderness. There is no guarding or rebound. Musculoskeletal: Normal range of motion. Skin: 
   General: Skin is warm and dry. Findings: No erythema. Neurological:  
   Mental Status: She is alert and oriented to person, place, and time. Psychiatric:     
   Mood and Affect: Mood normal.     
   Judgment: Judgment normal.  
 
 
 
Assessment/Plan:  Hilda Latham is a 38 y.o. female who has signs and symptoms consistent with left breast fibroadenoma 1.9 cm. The patient wishes to have this removed. I have recommended a needle localization lumpectomy and she will be scheduled the next available date and time. ICD-10-CM ICD-9-CM 1. Fibroadenoma of left breast D24.2 217 I went through the risks of bleeding, infection and anesthesia. Counseling time:counseling time more than 50% of visit: 1 hour was utilized in office visit today 50% of times in a face-to-face question explaining fibroadenoma. We discussed the anatomy of the breast and the types of tissues encountered with fibroadenoma. We discussed that this is significantly different than breast cancer. We discussed the details of lumpectomy and wire localization. We discussed the details of postoperative recovery time and expectations.  
 
Signed: Albertina Godoy DO  
6/15/2020  11:47 AM

## 2020-07-01 ENCOUNTER — HOSPITAL ENCOUNTER (OUTPATIENT)
Dept: SURGERY | Age: 40
Discharge: HOME | End: 2020-07-01

## 2020-07-02 VITALS — HEIGHT: 64 IN | BODY MASS INDEX: 49.34 KG/M2 | WEIGHT: 289 LBS

## 2020-07-02 NOTE — PERIOP NOTES
Patient verified name and . Order for consent NOT found in EHR; patient verifies procedure. Type 1B surgery, phone assessment complete. Orders NOT received. Labs per surgeon: no orders in EMR at this time  Labs per anesthesia protocol: none    Patient answered medical/surgical history questions at their best of ability. All prior to admission medications documented in [facility]. Patient instructed to take the following medications the day of surgery according to anesthesia guidelines with a small sip of water: albuterol inhaler PRN- bring DOS, amlodipine, zyrtec PRN, flonase, ativan PRN, singulair, zantac. Hold all vitamins 7 days prior to surgery and NSAIDS 5 days prior to surgery. Prescription meds to hold: none. Patient instructed on the following:  >A negative Covid swab result is required to proceed with surgery- pt states she had test collection 2020- results not found in EMR at this time. > 1 visitor allowed at this time. >Arrive at [facility], C Entrance, time of arrival to be called the day before by 1700. >NPO after midnight including gum, mints, and ice chips. >Responsible adult must drive patient to the hospital, stay during surgery, and patient will need supervision 24 hours after anesthesia. >Use antibacterial soap in shower the night before surgery and on the morning of surgery. >All piercings must be removed prior to arrival.    >Leave all valuables (money and jewelry) at home but bring insurance card and ID on DOS. >Do not wear make-up, nail polish, lotions, cologne, perfumes, powders, or oil on skin. Patient teach back successful and patient demonstrates knowledge of instruction.

## 2020-07-07 ENCOUNTER — ANESTHESIA EVENT (OUTPATIENT)
Dept: SURGERY | Age: 40
End: 2020-07-07
Payer: COMMERCIAL

## 2020-07-08 ENCOUNTER — HOSPITAL ENCOUNTER (OUTPATIENT)
Age: 40
Setting detail: OUTPATIENT SURGERY
Discharge: HOME | End: 2020-07-08
Attending: SURGERY | Admitting: SURGERY
Payer: COMMERCIAL

## 2020-07-08 ENCOUNTER — ANESTHESIA (OUTPATIENT)
Dept: SURGERY | Age: 40
End: 2020-07-08
Payer: COMMERCIAL

## 2020-07-08 ENCOUNTER — APPOINTMENT (OUTPATIENT)
Dept: MAMMOGRAPHY | Age: 40
End: 2020-07-08
Attending: SURGERY
Payer: COMMERCIAL

## 2020-07-08 VITALS
BODY MASS INDEX: 50.28 KG/M2 | RESPIRATION RATE: 16 BRPM | DIASTOLIC BLOOD PRESSURE: 67 MMHG | TEMPERATURE: 97.7 F | SYSTOLIC BLOOD PRESSURE: 132 MMHG | HEART RATE: 56 BPM | OXYGEN SATURATION: 96 % | WEIGHT: 292.9 LBS

## 2020-07-08 DIAGNOSIS — D24.2 FIBROADENOMA OF LEFT BREAST: ICD-10-CM

## 2020-07-08 DIAGNOSIS — Z98.890 S/P BREAST LUMPECTOMY: ICD-10-CM

## 2020-07-08 PROCEDURE — 76210000020 HC REC RM PH II FIRST 0.5 HR: Performed by: SURGERY

## 2020-07-08 PROCEDURE — 77030037088 HC TUBE ENDOTRACH ORAL NSL COVD-A: Performed by: ANESTHESIOLOGY

## 2020-07-08 PROCEDURE — 76060000032 HC ANESTHESIA 0.5 TO 1 HR: Performed by: SURGERY

## 2020-07-08 PROCEDURE — 88307 TISSUE EXAM BY PATHOLOGIST: CPT

## 2020-07-08 PROCEDURE — 76010000160 HC OR TIME 0.5 TO 1 HR INTENSV-TIER 1: Performed by: SURGERY

## 2020-07-08 PROCEDURE — 77030002996 HC SUT SLK J&J -A: Performed by: SURGERY

## 2020-07-08 PROCEDURE — 77030018836 HC SOL IRR NACL ICUM -A: Performed by: SURGERY

## 2020-07-08 PROCEDURE — 74011000250 HC RX REV CODE- 250: Performed by: SURGERY

## 2020-07-08 PROCEDURE — 77030031139 HC SUT VCRL2 J&J -A: Performed by: SURGERY

## 2020-07-08 PROCEDURE — 77030040361 HC SLV COMPR DVT MDII -B: Performed by: SURGERY

## 2020-07-08 PROCEDURE — 74011250637 HC RX REV CODE- 250/637: Performed by: ANESTHESIOLOGY

## 2020-07-08 PROCEDURE — 77065 DX MAMMO INCL CAD UNI: CPT

## 2020-07-08 PROCEDURE — 77030041445 HC PENCL ELECT SMK EVAC STRY -B: Performed by: SURGERY

## 2020-07-08 PROCEDURE — 77030003460 US PLC NDL/WIRE/CLIP/SEED BREAST LT

## 2020-07-08 PROCEDURE — 74011250636 HC RX REV CODE- 250/636: Performed by: SURGERY

## 2020-07-08 PROCEDURE — 74011250636 HC RX REV CODE- 250/636: Performed by: ANESTHESIOLOGY

## 2020-07-08 PROCEDURE — 74011250636 HC RX REV CODE- 250/636: Performed by: NURSE ANESTHETIST, CERTIFIED REGISTERED

## 2020-07-08 PROCEDURE — 76210000006 HC OR PH I REC 0.5 TO 1 HR: Performed by: SURGERY

## 2020-07-08 PROCEDURE — 77030010512 HC APPL CLP LIG J&J -C: Performed by: SURGERY

## 2020-07-08 PROCEDURE — 74011000250 HC RX REV CODE- 250: Performed by: NURSE ANESTHETIST, CERTIFIED REGISTERED

## 2020-07-08 PROCEDURE — 77030021678 HC GLIDESCP STAT DISP VERT -B: Performed by: ANESTHESIOLOGY

## 2020-07-08 PROCEDURE — 19301 PARTIAL MASTECTOMY: CPT | Performed by: SURGERY

## 2020-07-08 RX ORDER — OXYCODONE AND ACETAMINOPHEN 5; 325 MG/1; MG/1
1 TABLET ORAL
Qty: 20 TAB | Refills: 0 | Status: SHIPPED | OUTPATIENT
Start: 2020-07-08 | End: 2020-07-13

## 2020-07-08 RX ORDER — LIDOCAINE HYDROCHLORIDE 10 MG/ML
0.1 INJECTION INFILTRATION; PERINEURAL AS NEEDED
Status: DISCONTINUED | OUTPATIENT
Start: 2020-07-08 | End: 2020-07-08 | Stop reason: HOSPADM

## 2020-07-08 RX ORDER — SODIUM CHLORIDE, SODIUM LACTATE, POTASSIUM CHLORIDE, CALCIUM CHLORIDE 600; 310; 30; 20 MG/100ML; MG/100ML; MG/100ML; MG/100ML
1000 INJECTION, SOLUTION INTRAVENOUS CONTINUOUS
Status: DISCONTINUED | OUTPATIENT
Start: 2020-07-08 | End: 2020-07-08 | Stop reason: HOSPADM

## 2020-07-08 RX ORDER — OXYCODONE HYDROCHLORIDE 5 MG/1
5 TABLET ORAL
Status: COMPLETED | OUTPATIENT
Start: 2020-07-08 | End: 2020-07-08

## 2020-07-08 RX ORDER — HYDROMORPHONE HYDROCHLORIDE 2 MG/ML
0.5 INJECTION, SOLUTION INTRAMUSCULAR; INTRAVENOUS; SUBCUTANEOUS
Status: DISCONTINUED | OUTPATIENT
Start: 2020-07-08 | End: 2020-07-08 | Stop reason: HOSPADM

## 2020-07-08 RX ORDER — LIDOCAINE HYDROCHLORIDE 20 MG/ML
INJECTION, SOLUTION EPIDURAL; INFILTRATION; INTRACAUDAL; PERINEURAL AS NEEDED
Status: DISCONTINUED | OUTPATIENT
Start: 2020-07-08 | End: 2020-07-08 | Stop reason: HOSPADM

## 2020-07-08 RX ORDER — SCOLOPAMINE TRANSDERMAL SYSTEM 1 MG/1
1 PATCH, EXTENDED RELEASE TRANSDERMAL
Status: DISCONTINUED | OUTPATIENT
Start: 2020-07-08 | End: 2020-07-08 | Stop reason: HOSPADM

## 2020-07-08 RX ORDER — ALBUTEROL SULFATE 0.83 MG/ML
2.5 SOLUTION RESPIRATORY (INHALATION) AS NEEDED
Status: DISCONTINUED | OUTPATIENT
Start: 2020-07-08 | End: 2020-07-08 | Stop reason: HOSPADM

## 2020-07-08 RX ORDER — FENTANYL CITRATE 50 UG/ML
INJECTION, SOLUTION INTRAMUSCULAR; INTRAVENOUS AS NEEDED
Status: DISCONTINUED | OUTPATIENT
Start: 2020-07-08 | End: 2020-07-08 | Stop reason: HOSPADM

## 2020-07-08 RX ORDER — BUPIVACAINE HYDROCHLORIDE AND EPINEPHRINE 2.5; 5 MG/ML; UG/ML
INJECTION, SOLUTION EPIDURAL; INFILTRATION; INTRACAUDAL; PERINEURAL AS NEEDED
Status: DISCONTINUED | OUTPATIENT
Start: 2020-07-08 | End: 2020-07-08 | Stop reason: HOSPADM

## 2020-07-08 RX ORDER — MIDAZOLAM HYDROCHLORIDE 1 MG/ML
2 INJECTION, SOLUTION INTRAMUSCULAR; INTRAVENOUS
Status: COMPLETED | OUTPATIENT
Start: 2020-07-08 | End: 2020-07-08

## 2020-07-08 RX ORDER — SUCCINYLCHOLINE CHLORIDE 20 MG/ML
INJECTION INTRAMUSCULAR; INTRAVENOUS AS NEEDED
Status: DISCONTINUED | OUTPATIENT
Start: 2020-07-08 | End: 2020-07-08 | Stop reason: HOSPADM

## 2020-07-08 RX ORDER — LIDOCAINE HYDROCHLORIDE 10 MG/ML
5 INJECTION INFILTRATION; PERINEURAL
Status: COMPLETED | OUTPATIENT
Start: 2020-07-08 | End: 2020-07-08

## 2020-07-08 RX ORDER — ROCURONIUM BROMIDE 10 MG/ML
INJECTION, SOLUTION INTRAVENOUS AS NEEDED
Status: DISCONTINUED | OUTPATIENT
Start: 2020-07-08 | End: 2020-07-08 | Stop reason: HOSPADM

## 2020-07-08 RX ORDER — DEXAMETHASONE SODIUM PHOSPHATE 4 MG/ML
INJECTION, SOLUTION INTRA-ARTICULAR; INTRALESIONAL; INTRAMUSCULAR; INTRAVENOUS; SOFT TISSUE AS NEEDED
Status: DISCONTINUED | OUTPATIENT
Start: 2020-07-08 | End: 2020-07-08 | Stop reason: HOSPADM

## 2020-07-08 RX ORDER — ACETAMINOPHEN 500 MG
1000 TABLET ORAL ONCE
Status: COMPLETED | OUTPATIENT
Start: 2020-07-08 | End: 2020-07-08

## 2020-07-08 RX ORDER — ONDANSETRON 2 MG/ML
INJECTION INTRAMUSCULAR; INTRAVENOUS AS NEEDED
Status: DISCONTINUED | OUTPATIENT
Start: 2020-07-08 | End: 2020-07-08 | Stop reason: HOSPADM

## 2020-07-08 RX ORDER — CLINDAMYCIN PHOSPHATE 900 MG/50ML
900 INJECTION INTRAVENOUS ONCE
Status: COMPLETED | OUTPATIENT
Start: 2020-07-08 | End: 2020-07-08

## 2020-07-08 RX ORDER — ONDANSETRON 2 MG/ML
4 INJECTION INTRAMUSCULAR; INTRAVENOUS
Status: DISCONTINUED | OUTPATIENT
Start: 2020-07-08 | End: 2020-07-08 | Stop reason: HOSPADM

## 2020-07-08 RX ORDER — PROPOFOL 10 MG/ML
INJECTION, EMULSION INTRAVENOUS AS NEEDED
Status: DISCONTINUED | OUTPATIENT
Start: 2020-07-08 | End: 2020-07-08 | Stop reason: HOSPADM

## 2020-07-08 RX ADMIN — PROPOFOL 200 MG: 10 INJECTION, EMULSION INTRAVENOUS at 13:33

## 2020-07-08 RX ADMIN — SODIUM CHLORIDE, SODIUM LACTATE, POTASSIUM CHLORIDE, AND CALCIUM CHLORIDE 1000 ML: 600; 310; 30; 20 INJECTION, SOLUTION INTRAVENOUS at 13:19

## 2020-07-08 RX ADMIN — FENTANYL CITRATE 50 MCG: 50 INJECTION INTRAMUSCULAR; INTRAVENOUS at 14:27

## 2020-07-08 RX ADMIN — MIDAZOLAM 2 MG: 1 INJECTION INTRAMUSCULAR; INTRAVENOUS at 13:33

## 2020-07-08 RX ADMIN — OXYCODONE 5 MG: 5 TABLET ORAL at 15:02

## 2020-07-08 RX ADMIN — LIDOCAINE HYDROCHLORIDE 5 ML: 10 INJECTION, SOLUTION INFILTRATION; PERINEURAL at 12:05

## 2020-07-08 RX ADMIN — LIDOCAINE HYDROCHLORIDE 60 MG: 20 INJECTION, SOLUTION EPIDURAL; INFILTRATION; INTRACAUDAL; PERINEURAL at 13:33

## 2020-07-08 RX ADMIN — CLINDAMYCIN PHOSPHATE 900 MG: 900 INJECTION, SOLUTION INTRAVENOUS at 13:53

## 2020-07-08 RX ADMIN — ACETAMINOPHEN 1000 MG: 500 TABLET, FILM COATED ORAL at 13:04

## 2020-07-08 RX ADMIN — ROCURONIUM BROMIDE 5 MG: 10 INJECTION, SOLUTION INTRAVENOUS at 13:33

## 2020-07-08 RX ADMIN — FENTANYL CITRATE 50 MCG: 50 INJECTION INTRAMUSCULAR; INTRAVENOUS at 13:59

## 2020-07-08 RX ADMIN — SUCCINYLCHOLINE CHLORIDE 180 MG: 20 INJECTION, SOLUTION INTRAMUSCULAR; INTRAVENOUS at 13:33

## 2020-07-08 RX ADMIN — DEXAMETHASONE SODIUM PHOSPHATE 4 MG: 4 INJECTION, SOLUTION INTRAMUSCULAR; INTRAVENOUS at 14:11

## 2020-07-08 RX ADMIN — FENTANYL CITRATE 100 MCG: 50 INJECTION INTRAMUSCULAR; INTRAVENOUS at 13:41

## 2020-07-08 RX ADMIN — ONDANSETRON 4 MG: 2 INJECTION INTRAMUSCULAR; INTRAVENOUS at 14:11

## 2020-07-08 NOTE — OP NOTES
[address] [address]  OPERATIVE REPORT    Name:  Emma Dowd  MR#:  [id number]  :  1980  ACCOUNT #:  [de-identified]  DATE OF SERVICE:  2020    PREOPERATIVE DIAGNOSIS:  Left breast fibroadenoma. POSTOPERATIVE DIAGNOSIS:  Left breast fibroadenoma. PROCEDURE PERFORMED:  Left breast needle localization partial mastectomy, CPT code 53792. SURGEON:  Jenny Crandall DO    ASSISTANT:  None. ANESTHESIA:  General endotracheal.    COMPLICATIONS:  None. SPECIMENS REMOVED:  Left breast partial mastectomy marked with a long medial, short superior, double deep suture, three clips medial, two superior and one for the deep margin. IMPLANTS:  None. ESTIMATED BLOOD LOSS:  Minimal.    DISPOSITION:  Stable. COUNTS:  Sponge count, needle count, and instrument count all correct x3. DESCRIPTION OF PROCEDURE:  This is a 43-year-old female with left breast fibroadenoma electing for removal, it is nonpalpable, so needle localization partial mastectomy is planned. Consent was obtained by describing the procedure to the patient including potential complications to include infection, bleeding, and possible positive margins. Consent was obtained and placed in the final chart. She was administered clindamycin 900 mg IV preoperatively, taken to the operative suite and placed in the supine position. General anesthesia was initiated without complications. She was prepped and draped in the usual sterile fashion. Following this, time-out was taken to confirm the patient name and proper procedure. The patient had been to Radiology, where needle localization was placed. Radiographs were reviewed to determine the best approach. We then planned an incision. A 0.25% Marcaine with epinephrine was used to anesthetize the skin and subcutaneous tissue. A #15 scalpel blade was used to make a linear incision.   Bovie cauterization was used to dissect down the subcutaneous tissue where the needle guidewire was stabilized using Allis clamps. We continued dissecting circumferentially excising the lesion marking with a long medial, short superior, double deep suture, three clips medial, two superior and one for the deep margin. The fibroadenoma was visible and radiographs revealed the biopsy clip within the central portion of the specimen with successful excision. There were no additional palpable fibroadenomas within the breast.  We copiously irrigated with saline until clear. We ensured hemostasis using spot cauterization. We reapproximated the subcutaneous tissue with 3-0 Vicryl in an interrupted fashion and 3-0 Vicryl in a simple running fashion. Mastisol and Steri-Strips were placed on top of the incision. A sterile dressing was applied. The patient will be extubated and transferred to Recovery stable. FINDINGS:  An 50-year-old female with left breast fibroadenoma. Successful needle localization partial mastectomy was performed with radiographic findings and biopsy clip within the radiograph. She tolerated the procedure well.       [address], DO      [id number]  D:  07/08/2020 14:24  T:  07/08/2020 19:39  JOB #:  [id number]

## 2020-07-08 NOTE — DISCHARGE INSTRUCTIONS
Post op instructions:  1. May shower only, no tub bathing, no hot tub, no swimming. 2. Keep incision clean with regular soap and water. 3. No lifting over 10 lbs. 4. Regular diet as tolerated. 5. May remove topical dressing on Day #2. Leave steri strips until seen in Dr. Godoy's office at follow up appointment. 6. No driving on pain medicines. 7. Resume home medicines as usual.  Gradie Levers, DO    After general anesthesia or intravenous sedation, for 24 hours or while taking prescription Narcotics:  · Limit your activities  · A responsible adult needs to be with you for the next 24 hours  · Do not drive and operate hazardous machinery  · Do not make important personal or business decisions  · Do not drink alcoholic beverages  · If you have not urinated within 8 hours after discharge, please contact your surgeon on call. · If you have sleep apnea and have a CPAP machine, please use it for all naps and sleeping. · Please use caution when taking narcotics and any of your home medications that may cause drowsiness. *  Please give a list of your current medications to your Primary Care Provider. *  Please update this list whenever your medications are discontinued, doses are      changed, or new medications (including over-the-counter products) are added. *  Please carry medication information at all times in case of emergency situations. These are general instructions for a healthy lifestyle:  No smoking/ No tobacco products/ Avoid exposure to second hand smoke  Surgeon General's Warning:  Quitting smoking now greatly reduces serious risk to your health.   Obesity, smoking, and sedentary lifestyle greatly increases your risk for illness  A healthy diet, regular physical exercise & weight monitoring are important for maintaining a healthy lifestyle    You may be retaining fluid if you have a history of heart failure or if you experience any of the following symptoms:  Weight gain of 3 pounds or more overnight or 5 pounds in a week, increased swelling in our hands or feet or shortness of breath while lying flat in bed. Please call your doctor as soon as you notice any of these symptoms; do not wait until your next office visit.

## 2020-07-08 NOTE — BRIEF OP NOTE
Brief Postoperative Note    Patient: Karen Marroquin  YOB: 1980  MRN: [id number]    Date of Procedure: 7/8/2020     Pre-Op Diagnosis: Fibroadenoma of breast, left [D24.2]    Post-Op Diagnosis: Same as preoperative diagnosis. Procedure(s):  LEFT BREAST NEEDLE LOCALIZATION PARTIAL MASTECTOMY CPT 93492    Surgeon(s):  Curtis Dunn DO    Surgical Assistant: None    Anesthesia: General     Estimated Blood Loss (mL): Minimal    Complications: None    Specimens:   ID Type Source Tests Collected by Time Destination   1 : Left breast Fresh Breast  Curtis Dunn DO 7/8/2020 1403 Pathology        Implants: * No implants in log *    Drains: * No LDAs found *    Findings: Successful needle loc partial mastectomy. Biopsy clip in specimen on radiograph.     Electronically Signed by Kali Horne DO on 7/8/2020 at 2:20 PM  [id number]

## 2020-07-08 NOTE — ANESTHESIA POSTPROCEDURE EVALUATION
Procedure(s):  LEFT BREAST LUMPECTOMY  LEFT BREAST NEEDLE LOCALIZED BIOPSY  PREOP @ 10:00 NEEDLE LOC @ 11:30 / SURGERY @ 1:30.    general    Anesthesia Post Evaluation      Multimodal analgesia: multimodal analgesia used between 6 hours prior to anesthesia start to PACU discharge  Patient location during evaluation: PACU  Patient participation: complete - patient participated  Level of consciousness: awake and alert  Pain management: adequate  Airway patency: patent  Anesthetic complications: no  Cardiovascular status: acceptable  Respiratory status: acceptable  Hydration status: acceptable  Post anesthesia nausea and vomiting:  none  Final Post Anesthesia Temperature Assessment:  Normothermia (36.0-37.5 degrees C)      INITIAL Post-op Vital signs:   Vitals Value Taken Time   /67 7/8/2020  2:52 PM   Temp 36.5 °C (97.7 °F) 7/8/2020  2:31 PM   Pulse 56 7/8/2020  2:52 PM   Resp 16 7/8/2020  2:52 PM   SpO2 96 % 7/8/2020  2:52 PM

## 2020-07-08 NOTE — PERIOP NOTES
[address] July 8, 2020       RE: Brandi Velazquez      To Whom It May Concern,    This is to certify that Brandi Velazquez was present for surgical procedural.      Please feel free to contact my office if you have any questions or concerns. Thank you for your assistance in this matter.       Sincerely,  Alan Fletcher RN

## 2020-07-08 NOTE — ANESTHESIA PREPROCEDURE EVALUATION
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   Neuro/Psych         Psychiatric history     Cardiovascular    Hypertension (no meds currently)              Exercise tolerance: >4 METS  Comments: H/O DVT/PE, thought secondary to OCP use   GI/Hepatic/Renal     GERD: well controlled           Endo/Other        Morbid obesity     Other Findings              Physical Exam    Airway  Mallampati: II  TM Distance: > 6 cm  Neck ROM: normal range of motion   Mouth opening: Normal     Cardiovascular    Rhythm: regular  Rate: normal         Dental  No notable dental hx       Pulmonary  Breath sounds clear to auscultation               Abdominal         Other Findings            Anesthetic Plan    ASA: 3  Anesthesia type: general          Induction: Intravenous  Anesthetic plan and risks discussed with: Patient and Spouse

## 2020-07-08 NOTE — INTERVAL H&P NOTE
Update History & Physical 
 
The Patient's History and Physical of Ketty 15,  
2020 was reviewed with the patient and I examined the patient. There was no change. The surgical site was confirmed by the patient and me. Plan:  The risk, benefits, expected outcome, and alternative to the recommended procedure have been discussed with the patient. Patient understands and wants to proceed with the procedure.  
 
Electronically signed by Lauren Mccoy DO on 7/8/2020 at 1:22 PM

## 2020-07-09 NOTE — PROGRESS NOTES
Throat sore. Incision tender. Taking pain medication. Patient states taking a little long for her pain medication to kick in. Told patient she could take additional extra strength tylenol. Her medication has 325 mg in it. Safe dose of Tylenol in 24 hours is 3 g. Notified that she can take Tylenol 2 hours apart from her pain medication but not to exceed the daily limit. Notified to follow up with MD if throat does not get better and she feels like she is having difficulty swallowing. States she is taking liquids fine.

## 2022-03-18 PROBLEM — E66.01 OBESITY, MORBID (HCC): Status: ACTIVE | Noted: 2017-12-18

## 2022-03-19 PROBLEM — N93.9 ABNORMAL UTERINE BLEEDING (AUB): Status: ACTIVE | Noted: 2017-11-09

## 2022-03-19 PROBLEM — M53.3 SACROILIAC JOINT PAIN: Status: ACTIVE | Noted: 2019-12-06

## 2022-03-19 PROBLEM — D24.2 FIBROADENOMA OF BREAST, LEFT: Status: ACTIVE | Noted: 2020-07-08

## 2022-03-19 PROBLEM — R07.9 CHEST PAIN: Status: ACTIVE | Noted: 2019-10-01

## 2022-03-20 PROBLEM — F41.1 ANXIETY STATE: Status: ACTIVE | Noted: 2019-10-01

## 2022-03-20 PROBLEM — N63.20 LEFT BREAST LUMP: Status: ACTIVE | Noted: 2020-05-21

## 2022-03-21 PROBLEM — F33.41 RECURRENT MAJOR DEPRESSIVE DISORDER, IN PARTIAL REMISSION (HCC): Status: ACTIVE | Noted: 2021-06-30

## 2022-03-21 PROBLEM — R76.0 LUPUS ANTICOAGULANT POSITIVE: Status: ACTIVE | Noted: 2021-07-16

## 2022-03-21 PROBLEM — R73.03 PREDIABETES: Status: ACTIVE | Noted: 2022-01-27

## 2022-03-21 PROBLEM — J30.1 SEASONAL ALLERGIC RHINITIS DUE TO POLLEN: Status: ACTIVE | Noted: 2021-06-30

## 2022-03-21 PROBLEM — M79.605 LEFT LEG PAIN: Status: ACTIVE | Noted: 2022-02-21

## 2022-03-24 PROBLEM — R76.0 LUPUS ANTICOAGULANT POSITIVE: Status: ACTIVE | Noted: 2021-07-16

## 2022-03-24 PROBLEM — J30.1 SEASONAL ALLERGIC RHINITIS DUE TO POLLEN: Status: ACTIVE | Noted: 2021-06-30

## 2022-03-24 PROBLEM — R73.03 PREDIABETES: Status: ACTIVE | Noted: 2022-01-27

## 2022-03-24 PROBLEM — F33.41 RECURRENT MAJOR DEPRESSIVE DISORDER, IN PARTIAL REMISSION (HCC): Status: ACTIVE | Noted: 2021-06-30

## 2022-03-24 PROBLEM — M79.605 LEFT LEG PAIN: Status: ACTIVE | Noted: 2022-02-21

## 2022-06-29 ENCOUNTER — HOSPITAL ENCOUNTER (OUTPATIENT)
Dept: MAMMOGRAPHY | Age: 42
Discharge: HOME | End: 2022-07-02
Payer: COMMERCIAL

## 2022-06-29 DIAGNOSIS — Z12.31 ENCOUNTER FOR SCREENING MAMMOGRAM FOR MALIGNANT NEOPLASM OF BREAST: ICD-10-CM

## 2022-06-29 PROCEDURE — 77063 BREAST TOMOSYNTHESIS BI: CPT

## 2022-07-20 ENCOUNTER — OFFICE VISIT (OUTPATIENT)
Dept: OCCUPATIONAL MEDICINE | Age: 42
End: 2022-07-20

## 2022-07-20 DIAGNOSIS — Z20.822 ENCOUNTER FOR SCREENING LABORATORY TESTING FOR COVID-19 VIRUS: Primary | ICD-10-CM

## 2022-07-20 PROBLEM — G44.211 INTRACTABLE EPISODIC TENSION-TYPE HEADACHE: Status: ACTIVE | Noted: 2022-07-13

## 2022-07-20 PROBLEM — R73.03 PREDIABETES: Status: ACTIVE | Noted: 2022-01-27

## 2022-07-20 PROBLEM — R00.0 TACHYCARDIA: Status: ACTIVE | Noted: 2022-04-01

## 2022-07-20 PROBLEM — I87.8 VENOUS STASIS: Status: ACTIVE | Noted: 2022-07-13

## 2022-07-20 PROBLEM — M53.3 SACROILIAC JOINT PAIN: Status: ACTIVE | Noted: 2019-12-06

## 2022-07-20 PROBLEM — F41.1 ANXIETY STATE: Status: ACTIVE | Noted: 2019-10-01

## 2022-07-20 PROBLEM — I10 BENIGN HYPERTENSION: Status: ACTIVE | Noted: 2022-07-13

## 2022-07-20 PROBLEM — F33.41 RECURRENT MAJOR DEPRESSIVE DISORDER, IN PARTIAL REMISSION (HCC): Status: ACTIVE | Noted: 2021-06-30

## 2022-07-20 PROBLEM — E53.8 B12 DEFICIENCY: Status: ACTIVE | Noted: 2022-07-13

## 2022-07-20 LAB — SARS-COV-2, POC: NORMAL

## 2022-07-20 PROCEDURE — 87426 SARSCOV CORONAVIRUS AG IA: CPT | Performed by: NURSE PRACTITIONER

## 2022-07-20 PROCEDURE — 99212 OFFICE O/P EST SF 10 MIN: CPT | Performed by: NURSE PRACTITIONER

## 2022-07-20 RX ORDER — HYDROXYZINE HYDROCHLORIDE 25 MG/1
25 TABLET, FILM COATED ORAL
COMMUNITY
Start: 2022-07-13 | End: 2022-09-11

## 2022-07-20 NOTE — PROGRESS NOTES
Rapid COVID testing performed today as requested by patient's employer. Discussed that HR notification of result is necessary for medical surveillance during a pandemic state  in order to complete proper disinfection techniques and contact tracing processes if results are positive, and if results are negative they should remain vigilant in monitoring for symptom development as additional testing may be warranted. Patient consented to testing today (results below) and COVID educational handout provided. RTC as needed for complaints or concerns that may arise.      Results for orders placed or performed in visit on 07/20/22   AMB POC SARS-COV-2   Result Value Ref Range    SARS-COV-2, POC Not-Detected Not Detected       Stepan Woodson, APRN - CNP

## 2022-07-20 NOTE — PATIENT INSTRUCTIONS
Patient Education        Learning About Coronavirus [phone])  What is coronavirus (COVID-19)? COVID-19 is a disease caused by a type of coronavirus. This illness was firstfound in December 2019. It has since spread worldwide. Coronaviruses are a large group of viruses. They cause the common cold. They also cause more serious illnesses like [place] respiratory syndrome (MERS) and severe acute respiratory syndrome (SARS). COVID-19 is caused by a novelcoronavirus. That means it's a new type that has not been seen in people before. What are the symptoms? COVID-19 symptoms may include:  Fever. Cough. Trouble breathing. Chills or repeated shaking with chills. Muscle and body aches. Headache. Sore throat. New loss of taste or smell. Vomiting. Diarrhea. In severe cases, COVID-19 can cause pneumonia and make it hard to breathewithout help from a machine. It can cause death. How is it diagnosed? COVID-19 is diagnosed with a viral test. This may also be called a PCR test or antigen test. It looks for evidence of the virus in your breathing passages orlungs (respiratory system). The test is most often done on a sample from the nose, throat, or lungs. It's sometimes done on a sample of saliva. One way a sample is collected is byputting a long swab into the back of your nose. If you have questions about COVID-19 testing, ask your doctor or go to cdc.govto use the COVID-19 Viral Testing Tool. How is it treated? Mild cases of COVID-19 can be treated at home. Serious cases need treatment in the hospital. Treatment may include medicines, plus breathing support such as oxygen therapy or a ventilator. Some people may be placed on their belly tohelp their oxygen levels. Treatments that may help people who have COVID-19 include:  Antiviral medicines. These medicines treat viral infections. Immune-based therapy. These medicines help the immune system fight COVID-19.  Examples include monoclonal antibodies. Blood thinners. These medicines help prevent blood clots. People with severe illness are at risk for blood clots. How can you protect yourself and others? Stay up to date on your COVID-19 vaccines. Avoid sick people, and stay away from others if you are sick. Stay at least 6 feet away from other people. Avoid crowds, especially inside. Get tested for COVID-19 before you have an indoor visit with people who don't live with you. Improve the airflow when you spend time indoors with people who don't live with you. If you can, open windows and doors. Or you can use a fan to blow air away from people and out a window. Cover your mouth with a tissue when you cough or sneeze. Wash your hands often, especially after you cough or sneeze. Use soap and water, and scrub for at least 20 seconds. If soap and water aren't available, use an alcohol-based hand . Avoid touching your mouth, nose, and eyes. Check the CDC website at cdc.gov for the most current information on how to protect yourself. And if you have questions, ask your doctor or go to cdc.govto use the COVID-19 Quarantine and Isolation Calculator. Here are some other steps you may need to take. If you are not up to date on your COVID-19 vaccines:  Wear a mask with the best fit, protection, and comfort for you. A mask can protect you even when others aren't wearing one. This might be especially important if you:  Have certain health conditions. Live with someone who has a compromised immune system. Live with someone who is not up to date on their COVID-19 vaccines. If you have been exposed to the virus AND are not up to date on your COVID-19 vaccines:  Talk to your doctor as soon as you can. Your doctor might have you take medicine to help prevent serious illness. Get a COVID-19 test. You may need to be tested more than once. And if your test is positive, follow the instructions below. Stay home.  Try to separate from other people where you live. Don't go to school, work, or public areas. Wear a well-fitting mask around other people for a full 10 days. Avoid travel, and stay away from people at high risk for serious illness. Watch for symptoms. If you have been exposed AND either tested positive for COVID-19 in the last 90 days and have recovered or you are up to date on your COVID-19 vaccines:  Talk to your doctor as soon as you can. Your doctor may have you take medicine to help prevent serious illness. Get a COVID-19 test. Wait 5 days after you were last exposed. You may need to be tested more than once. And if your test is positive, follow the instructions below. Wear a well-fitting mask around other people for a full 10 days. Avoid travel and stay away from people at high risk for serious illness. Watch for symptoms. If you tested positive for COVID-19 in the last 90 days and have not recovered, another COVID-19 test may not be needed. If you're sick or test positive for COVID-19:  Talk to your doctor as soon as you can. Your doctor may have you take medicine to help prevent serious illness. Get a COVID-19 test unless you have already been tested. You may need to be tested more than once. Stay home. Leave only if you need to get medical care. If you were seriously ill or if you have a weakened immune system, you may need to isolate for several weeks. For a full 10 days, wear a well-fitting mask whenever you're around other people. Avoid travel and stay away from people at high risk for serious illness. Limit contact with pets and people in your home. If possible, stay in a separate bedroom and use a separate bathroom. Clean and disinfect your home every day. Use household  and disinfectant wipes or sprays. Take special care to clean things that you touch with your hands. How can you self-isolate when you have COVID-19?   If you have COVID-19, there are things you can do to help avoid spreading thevirus to others. Stay home, and avoid contact with other people. Limit contact with people in your home. If possible, stay in a separate bedroom and use a separate bathroom. Wear a well-fitting mask when you are around other people. Avoid contact with pets and other animals. Cover your mouth and nose with a tissue when you cough or sneeze. Then throw it in the trash right away. Wash your hands often, especially after you cough or sneeze. Use soap and water, and scrub for at least 20 seconds. If soap and water aren't available, use an alcohol-based hand . Don't share personal household items. These include bedding, towels, cups and glasses, and eating utensils. [address] in the warmest water allowed for the fabric type, and dry it completely. It's okay to wash other people's laundry with yours. Clean and disinfect your home. Use household  and disinfectant wipes or sprays. If you have questions, visit cdc.gov to check the Quarantine and IsolationCalculator. When should you call for help? Call 911 anytime you think you may need emergency care. For example, call if you have life-threatening symptoms, such as: You have severe trouble breathing. (You can't talk at all.)     You have constant chest pain or pressure. You are severely dizzy or lightheaded. You are confused or can't think clearly. You have pale, gray, or blue-colored skin or lips. You pass out (lose consciousness) or are very hard to wake up. You have loss of balance or trouble walking. You have trouble seeing out of one or both eyes. You have weakness or drooping on one side of the face. You have weakness or numbness in an arm or a leg. You have trouble speaking. You have a severe headache. You have a seizure. Call your doctor now or seek immediate medical care if:    You have moderate trouble breathing. (You can't speak a full sentence.)     You are coughing up blood.      You have signs of low blood pressure. These include feeling lightheaded; being too weak to stand; and having cold, pale, clammy skin. Watch closely for changes in your health, and be sure to contact your doctor if:    Your symptoms get worse. You are not getting better as expected. You have new or worse symptoms of anxiety, depression, nightmares, or flashbacks. Call before you go to the doctor's office. Follow their instructions. And wear a mask. Where can you learn more? Go to https://[facility]. org and sign in to your [employer] account. Enter C008 in the [employer] box to learn more about \"Learning About Coronavirus (COVID-19). \"     If you do not have an account, please click on the \"Sign Up Now\" link. Current as of: May 28, 2022               Content Version: 13.3  © 2173-5849 Healthwise, Incorporated. Care instructions adapted under license by [facility] ([facility]). If you have questions about a medical condition or this instruction, always ask your healthcare professional. [address] any warranty or liability for your use of this information.

## 2022-09-20 ENCOUNTER — OFFICE VISIT (OUTPATIENT)
Dept: OCCUPATIONAL MEDICINE | Age: 42
End: 2022-09-20

## 2022-09-20 VITALS
DIASTOLIC BLOOD PRESSURE: 88 MMHG | TEMPERATURE: 98.1 F | OXYGEN SATURATION: 98 % | SYSTOLIC BLOOD PRESSURE: 118 MMHG | HEART RATE: 117 BPM | RESPIRATION RATE: 18 BRPM

## 2022-09-20 DIAGNOSIS — M25.561 ACUTE PAIN OF RIGHT KNEE: Primary | ICD-10-CM

## 2022-09-20 RX ORDER — IBUPROFEN 600 MG/1
600 TABLET ORAL EVERY 8 HOURS PRN
Qty: 30 TABLET | Refills: 0 | Status: SHIPPED | OUTPATIENT
Start: 2022-09-20

## 2022-09-20 ASSESSMENT — ENCOUNTER SYMPTOMS
COLOR CHANGE: 0
SHORTNESS OF BREATH: 0

## 2022-09-20 NOTE — PROGRESS NOTES
PROGRESS NOTE    SUBJECTIVE:   Marifer Rueda is a 36 y.o. female seen for     Chief Complaint    Knee Pain         Patient presents to clinic today for complaint of right anterior and medial knee pain. Patient states pain began over the weekend. She denies any known injury. She describes the pain as throbbing and persistent in nature. She states that on Saturday she did experience intermittent episodes of sharp pain in the knee as well. She states that she has applied lidocaine cream with some relief and has taken some Flexeril that she had at home which provided significant pain relief. She states that she has experienced some pain and tingling in the right hip and lateral thigh that is intermittent. She denies any associated symptoms including chest pain, shortness of breath, warmth or redness to affected area. She does states that movement and weight bearing exacerbates her symptoms. Patient noted to have a history of a DVT (October 2021) and states that she has been told that she has arthritic changes in her knees. She verbalizes that these symptoms are different from those experienced when she had a DVT but are similar to the pain she experienced in the past that were improved with injections and provided significant relief for several years. Knee Pain   The incident occurred 3 to 5 days ago. There was no injury mechanism. The pain is present in the right knee and right hip. The pain is moderate. The pain has been Constant since onset. Associated symptoms include tingling. Pertinent negatives include no numbness. The symptoms are aggravated by movement and weight bearing. The treatment provided significant relief.      Current Outpatient Medications   Medication Sig Dispense Refill    ibuprofen (ADVIL;MOTRIN) 600 MG tablet Take 1 tablet by mouth every 8 hours as needed for Pain 30 tablet 0    metFORMIN (GLUCOPHAGE) 500 MG tablet TAKE 1 TABLET BY MOUTH TWICE A DAY      amLODIPine (NORVASC) 10 MG tablet Take 10 mg by mouth daily      aspirin 81 MG EC tablet Take 81 mg by mouth daily      buPROPion (WELLBUTRIN SR) 150 MG extended release tablet Take 150 mg by mouth 2 times daily      cetirizine (ZYRTEC) 10 MG tablet Take 10 mg by mouth daily as needed      cyclobenzaprine (FLEXERIL) 10 MG tablet Take by mouth 3 times daily as needed      fluticasone (FLONASE) 50 MCG/ACT nasal spray 2 sprays by Nasal route daily      LORazepam (ATIVAN) 0.5 MG tablet Take 0.5 mg by mouth every 4 hours as needed. No current facility-administered medications for this visit. Allergies   Allergen Reactions    Lisinopril Other (See Comments)     Intolerance to side effects  Other reaction(s): Other- (not listed) - Allergy  Intolerance to side effects    Loracarbef Other (See Comments)     PT IS NOT ALLERGIC    Hydrocodone-Acetaminophen Nausea And Vomiting       Social History     Tobacco Use    Smoking status: Former    Smokeless tobacco: Never   Substance Use Topics    Alcohol use: Yes     Alcohol/week: 5.0 standard drinks    Drug use: No        Review of Systems   Respiratory:  Negative for shortness of breath. Cardiovascular:  Negative for chest pain. Musculoskeletal:  Positive for arthralgias and joint swelling (mild to anterior right knee). Skin:  Negative for color change. Neurological:  Positive for tingling. Negative for numbness. OBJECTIVE:  /88 (Site: Left Upper Arm, Position: Sitting, Cuff Size: Large Adult)   Pulse (!) 117   Temp 98.1 °F (36.7 °C) (Oral)   Resp 18   SpO2 98%      No results found for this visit on 09/20/22. Physical Exam  Constitutional:       General: She is not in acute distress. Appearance: She is obese. Cardiovascular:      Rate and Rhythm: Normal rate and regular rhythm. Pulses: Normal pulses. Pulmonary:      Effort: Pulmonary effort is normal.      Breath sounds: Normal breath sounds. Musculoskeletal:      Right knee: Swelling present.  No deformity, erythema, ecchymosis or crepitus. Decreased range of motion. Tenderness (with palpation and difuse) present. Left knee: Normal.        Legs:    Skin:     General: Skin is warm and dry. Neurological:      Mental Status: She is alert and oriented to person, place, and time. Gait: Gait abnormal (limp realted to pain in right knee and hip). Psychiatric:         Mood and Affect: Mood normal.         Behavior: Behavior normal.       ASSESSMENT and PLAN    Naima was seen today for knee pain. Diagnoses and all orders for this visit:    Acute pain of right knee  -     ibuprofen (ADVIL;MOTRIN) 600 MG tablet; Take 1 tablet by mouth every 8 hours as needed for Pain    Discussed with patient the likelihood of symptoms being musculoskeletal in origin. Provided education on at home management of musculoskeletal pain including RICE (rest, ice, compression, elevation), gentle range of motion and stretching exercises, use of anti-inflammatory medication as needed, lifestyle modifications. Discussed risks/benefits, side effects of medication therapy. Provided education on new or worsening symptoms that would require prompt evaluation including worsening pain, pain in calf, worsening swelling, warmth to area, redness to area, chest pain, shortness of breath. Patient verbalized understanding. Discussed cost, risks/benefits of imaging with patient and will not pursue at this time. Patient plans to follow up with PCP for long term management and will discuss potential for joint injections and/or orthopedic referral. Patient verbalized understanding and is agreeable with the plan of care outlined. Counseled on benefits of having a primary care provider which includes, but is not limited to, continuity of care and having a medical home when concerns arise. Also enforced that onsite clinic policy states that we are not to take the place of a primary care provider, pt verbalized understanding.      I have reviewed the patient's medication list, past medical, family, social, and surgical history in detail and updated the patient record appropriately.     ISSA Victor - CNP

## 2022-09-28 ENCOUNTER — OFFICE VISIT (OUTPATIENT)
Dept: OCCUPATIONAL MEDICINE | Age: 42
End: 2022-09-28

## 2022-09-28 VITALS
DIASTOLIC BLOOD PRESSURE: 92 MMHG | TEMPERATURE: 98.1 F | OXYGEN SATURATION: 97 % | RESPIRATION RATE: 18 BRPM | SYSTOLIC BLOOD PRESSURE: 152 MMHG | HEART RATE: 108 BPM

## 2022-09-28 DIAGNOSIS — J00 ACUTE RHINITIS: Primary | ICD-10-CM

## 2022-09-28 RX ORDER — CETIRIZINE HYDROCHLORIDE 10 MG/1
10 TABLET ORAL DAILY
Qty: 30 TABLET | Refills: 3 | Status: SHIPPED | OUTPATIENT
Start: 2022-09-28

## 2022-09-28 RX ORDER — FLUTICASONE PROPIONATE 50 MCG
1 SPRAY, SUSPENSION (ML) NASAL DAILY
Qty: 1 EACH | Refills: 3 | Status: SHIPPED | OUTPATIENT
Start: 2022-09-28

## 2022-09-28 ASSESSMENT — ENCOUNTER SYMPTOMS
SINUS PAIN: 0
RHINORRHEA: 1
CHEST TIGHTNESS: 0
SORE THROAT: 1
SHORTNESS OF BREATH: 0
COUGH: 0
SINUS PRESSURE: 0

## 2022-09-28 NOTE — PROGRESS NOTES
PROGRESS NOTE    SUBJECTIVE:   Dora White is a 36 y.o. female seen for ____. Chief Complaint    Allergies         Patient presents to clinic today with complaints of runny nose, scratchy throat, and sneezing since Sunday. She states a history of seasonal allergies for which she has taken Zyrtec and Flonase for in the past with good symptom control. She states she is not currently taking allergy medication. Patient denies any recent sick contacts. Current Outpatient Medications   Medication Sig Dispense Refill    cetirizine (ZYRTEC) 10 MG tablet Take 1 tablet by mouth daily 30 tablet 3    fluticasone (FLONASE) 50 MCG/ACT nasal spray 1 spray by Each Nostril route daily 1 each 3    ibuprofen (ADVIL;MOTRIN) 600 MG tablet Take 1 tablet by mouth every 8 hours as needed for Pain 30 tablet 0    metFORMIN (GLUCOPHAGE) 500 MG tablet TAKE 1 TABLET BY MOUTH TWICE A DAY      amLODIPine (NORVASC) 10 MG tablet Take 10 mg by mouth daily      aspirin 81 MG EC tablet Take 81 mg by mouth daily      buPROPion (WELLBUTRIN SR) 150 MG extended release tablet Take 150 mg by mouth 2 times daily      cyclobenzaprine (FLEXERIL) 10 MG tablet Take by mouth 3 times daily as needed      LORazepam (ATIVAN) 0.5 MG tablet Take 0.5 mg by mouth every 4 hours as needed. No current facility-administered medications for this visit. Allergies   Allergen Reactions    Lisinopril Other (See Comments)     Intolerance to side effects  Other reaction(s): Other- (not listed) - Allergy  Intolerance to side effects    Loracarbef Other (See Comments)     PT IS NOT ALLERGIC    Hydrocodone-Acetaminophen Nausea And Vomiting       Social History     Tobacco Use    Smoking status: Former    Smokeless tobacco: Never   Substance Use Topics    Alcohol use: Yes     Alcohol/week: 5.0 standard drinks    Drug use: No        Review of Systems   Constitutional:  Negative for chills and fever.    HENT:  Positive for postnasal drip, rhinorrhea, sneezing and sore throat (described as scratchy). Negative for congestion, ear pain, sinus pressure and sinus pain. Respiratory:  Negative for cough, chest tightness and shortness of breath. Cardiovascular:  Negative for chest pain and palpitations. Musculoskeletal:  Negative for arthralgias and myalgias. Allergic/Immunologic: Positive for environmental allergies. Neurological:  Negative for light-headedness and headaches. OBJECTIVE:  BP (!) 152/92 (Site: Left Upper Arm, Position: Sitting, Cuff Size: Large Adult)   Pulse (!) 108   Temp 98.1 °F (36.7 °C) (Oral)   Resp 18   SpO2 97%      No results found for this visit on 09/28/22. Physical Exam  Constitutional:       Appearance: She is obese. HENT:      Right Ear: Tympanic membrane normal.      Left Ear: Tympanic membrane normal.      Nose: Rhinorrhea present. Rhinorrhea is clear. Right Turbinates: Enlarged (mildly). Left Turbinates: Enlarged (mildly). Right Sinus: No maxillary sinus tenderness or frontal sinus tenderness. Left Sinus: No maxillary sinus tenderness or frontal sinus tenderness. Mouth/Throat:      Mouth: Mucous membranes are moist.      Comments: Post nasal drip precipitated in posterior pharynx  Cardiovascular:      Rate and Rhythm: Regular rhythm. Tachycardia present. Pulses: Normal pulses. Pulmonary:      Effort: Pulmonary effort is normal.      Breath sounds: Normal breath sounds. Neurological:      Mental Status: She is alert and oriented to person, place, and time. Psychiatric:         Behavior: Behavior normal. Behavior is cooperative. ASSESSMENT and PLAN    Naima was seen today for allergies. Diagnoses and all orders for this visit:    Acute rhinitis  -     cetirizine (ZYRTEC) 10 MG tablet;  Take 1 tablet by mouth daily  -     fluticasone (FLONASE) 50 MCG/ACT nasal spray; 1 spray by Each Nostril route daily      Patient educated on typical course of illness; typically peak of symptoms around day 4-5 with resolution of symptoms between days 7-10. Discussed with patient OTC management of symptoms including Zyrtec and Flonase daily, OTC anti-inflammatory and decongestant use conservatively as needed, saline nasal spray, increased fluid intake, cool mist humidifier, warm steam showers, elevating the head of bed at night, avoiding triggers if allergen related. Discussed that if symptoms that persist beyond 7-10 days despite home management and interventions may need to be re-evaluated. Risks/benefits, side effects of all medications discussed. Encouraged patient to return to clinic for any new or worsening symptoms or concerns. Patient verbalized understanding and is agreeable with the outlined plan of care. Counseled on benefits of having a primary care provider which includes, but is not limited to, continuity of care and having a medical home when concerns arise. Also enforced that onsite clinic policy states that we are not to take the place of a primary care provider, pt verbalized understanding. I have reviewed the patient's medication list, past medical, family, social, and surgical history in detail and updated the patient record appropriately.     Meghana Delarosa, APRN - CNP

## 2023-01-06 DIAGNOSIS — M25.561 ACUTE PAIN OF RIGHT KNEE: ICD-10-CM

## 2023-01-06 PROBLEM — M79.605 LEFT LEG PAIN: Status: ACTIVE | Noted: 2021-07-15

## 2023-01-06 RX ORDER — TIRZEPATIDE 2.5 MG/.5ML
INJECTION, SOLUTION SUBCUTANEOUS
COMMUNITY
Start: 2023-01-02

## 2023-01-06 RX ORDER — IBUPROFEN 600 MG/1
600 TABLET ORAL EVERY 8 HOURS PRN
Qty: 30 TABLET | Refills: 0 | Status: SHIPPED | OUTPATIENT
Start: 2023-01-06

## 2023-01-06 NOTE — TELEPHONE ENCOUNTER
Patient contacted clinic requesting medication refill of their Ibuprofen therapy. Recent CMP showed on renal or hepatic impairments. Will provide refills to pharmacy on file as requested. RTC as needed for complaints or concerns that may arise      [address]  Outside Information      suggestion  Information displayed in this report may not trend or trigger automated decision support.       Contains abnormal data Basic Metabolic Panel  Order: [id number]   Ref Range & Units 7/13/22 1339   Glucose 65 - 99 mg/dL 120 High     BUN 6 - 24 mg/dL 9    Creatinine 0.57 - 1 mg/dL 0.57    BUN/Creat Ratio 9 - 23 16    Sodium 134 - 144 mmol/L 141    Potassium 3.5 - 5.2 mmol/L 4.4    Chloride 96 - 106 mmol/L 102    CO2 20 - 29 mmol/L 22    Calcium 8.7 - 10.2 mg/dL 8.9    eGFR >59 mL/min/1.73 116    Resulting Agency  [facility]   Narrative  Performed by [facility]  Performed at:  [address]   [address]  [id number]   : Amna Blanc MD, Phone:  [phone]  Specimen Collected: 07/13/22 13:39 Last Resulted: 07/14/22 06:36   Received From: [address]  Result Received: 07/20/22 10:28    View Encounter     Received Information  Result Information    Flag: Abnormal Abnormal   Status: Final result (Collected: 7/13/2022 13:39) Provider Status: Open     Click to Print Result  View SmartLink Info    Basic Metabolic Panel (Order #[id number]) on 7/13/22     Order Report     Order Details    ISSA Mcrae - CNP
Abormal VS: Temp > 100F or < 96.8F; SBP < 90 mmHG; HR > 120bpm; Resp > 24/min

## 2023-03-06 ENCOUNTER — OFFICE VISIT (OUTPATIENT)
Dept: OCCUPATIONAL MEDICINE | Age: 43
End: 2023-03-06

## 2023-03-06 VITALS
DIASTOLIC BLOOD PRESSURE: 82 MMHG | TEMPERATURE: 98.3 F | OXYGEN SATURATION: 99 % | RESPIRATION RATE: 18 BRPM | SYSTOLIC BLOOD PRESSURE: 122 MMHG | HEART RATE: 111 BPM

## 2023-03-06 DIAGNOSIS — R05.8 ALLERGIC COUGH: ICD-10-CM

## 2023-03-06 DIAGNOSIS — J30.1 SEASONAL ALLERGIC RHINITIS DUE TO POLLEN: Primary | ICD-10-CM

## 2023-03-06 PROBLEM — M25.561 CHRONIC PAIN OF RIGHT KNEE: Status: ACTIVE | Noted: 2021-07-15

## 2023-03-06 PROBLEM — G89.29 CHRONIC PAIN OF RIGHT KNEE: Status: ACTIVE | Noted: 2021-07-15

## 2023-03-06 PROBLEM — I10 BENIGN HYPERTENSION: Status: RESOLVED | Noted: 2022-07-13 | Resolved: 2023-03-06

## 2023-03-06 RX ORDER — HYDROXYZINE 50 MG/1
50 TABLET, FILM COATED ORAL
COMMUNITY
Start: 2023-02-23 | End: 2023-04-24

## 2023-03-06 RX ORDER — TIRZEPATIDE 5 MG/.5ML
INJECTION, SOLUTION SUBCUTANEOUS
COMMUNITY
Start: 2023-02-24

## 2023-03-06 RX ORDER — FLUTICASONE PROPIONATE 50 MCG
1 SPRAY, SUSPENSION (ML) NASAL DAILY
Qty: 1 EACH | Refills: 3 | Status: SHIPPED | OUTPATIENT
Start: 2023-03-06

## 2023-03-06 RX ORDER — ALBUTEROL SULFATE 90 UG/1
2 AEROSOL, METERED RESPIRATORY (INHALATION) 4 TIMES DAILY PRN
Qty: 18 G | Refills: 1 | Status: SHIPPED | OUTPATIENT
Start: 2023-03-06

## 2023-03-06 RX ORDER — VALSARTAN 80 MG/1
80 TABLET ORAL DAILY
COMMUNITY
Start: 2023-02-17 | End: 2023-03-19

## 2023-03-06 RX ORDER — MONTELUKAST SODIUM 10 MG/1
10 TABLET ORAL DAILY
Qty: 30 TABLET | Refills: 3 | Status: SHIPPED | OUTPATIENT
Start: 2023-03-06

## 2023-03-06 ASSESSMENT — ENCOUNTER SYMPTOMS
DIARRHEA: 0
EYE REDNESS: 0
SHORTNESS OF BREATH: 0
RHINORRHEA: 1
WHEEZING: 1
NAUSEA: 0
SINUS PAIN: 0
EYE ITCHING: 1
SORE THROAT: 0
VOMITING: 0
COUGH: 1
HEARTBURN: 0
EYE DISCHARGE: 0
EYE PAIN: 0
HEMOPTYSIS: 0
SINUS PRESSURE: 0
CHEST TIGHTNESS: 1

## 2023-03-06 NOTE — PROGRESS NOTES
PROGRESS NOTE    SUBJECTIVE:   Hua Gonzalez is a 36 y.o. female seen for cough similar to previous episodes when her seasonal allergies are particularly bad. Was prescribed albuterol inhaler in the past but needs a refill. Currently taking her Zyrtec for allergy relief but is still having significant breakthrough symptoms    Chief Complaint    Cough         Cough  This is a new problem. The current episode started in the past 7 days. The problem has been waxing and waning. The cough is Productive of sputum. Associated symptoms include ear congestion, nasal congestion, postnasal drip, rhinorrhea and wheezing. Pertinent negatives include no chest pain, chills, ear pain, eye redness, fever, headaches, heartburn, hemoptysis, myalgias, rash, sore throat, shortness of breath, sweats or weight loss. The symptoms are aggravated by pollens. Treatments tried: Zyrtec, ginger tea and honey. The treatment provided mild relief. Her past medical history is significant for environmental allergies.      Current Outpatient Medications   Medication Sig Dispense Refill    hydrOXYzine HCl (ATARAX) 50 MG tablet Take 50 mg by mouth      valsartan (DIOVAN) 80 MG tablet Take 80 mg by mouth daily      fluticasone (FLONASE) 50 MCG/ACT nasal spray 1 spray by Each Nostril route daily 1 each 3    albuterol sulfate HFA (VENTOLIN HFA) 108 (90 Base) MCG/ACT inhaler Inhale 2 puffs into the lungs 4 times daily as needed for Wheezing 18 g 1    montelukast (SINGULAIR) 10 MG tablet Take 1 tablet by mouth daily 30 tablet 3    MOUNJARO 5 MG/0.5ML SOPN SC injection INJECT 5 MG SUBCUTANEOUSLY EVERY 7 DAYS      ibuprofen (ADVIL;MOTRIN) 600 MG tablet Take 1 tablet by mouth every 8 hours as needed for Pain 30 tablet 0    cetirizine (ZYRTEC) 10 MG tablet Take 1 tablet by mouth daily 30 tablet 3    metFORMIN (GLUCOPHAGE) 500 MG tablet TAKE 1 TABLET BY MOUTH TWICE A DAY      amLODIPine (NORVASC) 10 MG tablet Take 10 mg by mouth daily      aspirin 81 MG EC tablet Take 81 mg by mouth daily      buPROPion (WELLBUTRIN SR) 150 MG extended release tablet Take 150 mg by mouth 2 times daily      cyclobenzaprine (FLEXERIL) 10 MG tablet Take by mouth 3 times daily as needed      LORazepam (ATIVAN) 0.5 MG tablet Take 0.5 mg by mouth every 4 hours as needed.       No current facility-administered medications for this visit.      Allergies   Allergen Reactions    Lisinopril Other (See Comments)     Intolerance to side effects  Other reaction(s): Other- (not listed) - Allergy  Intolerance to side effects    Loracarbef Other (See Comments)     PT IS NOT ALLERGIC    Hydrocodone-Acetaminophen Nausea And Vomiting       Social History     Tobacco Use    Smoking status: Former    Smokeless tobacco: Never   Substance Use Topics    Alcohol use: Yes     Alcohol/week: 5.0 standard drinks    Drug use: No        Review of Systems   Constitutional:  Negative for chills, fever and weight loss.   HENT:  Positive for postnasal drip, rhinorrhea and sneezing. Negative for ear discharge, ear pain, sinus pressure, sinus pain and sore throat.    Eyes:  Positive for itching. Negative for pain, discharge and redness.   Respiratory:  Positive for cough, chest tightness and wheezing. Negative for hemoptysis and shortness of breath.    Cardiovascular:  Negative for chest pain.   Gastrointestinal:  Negative for diarrhea, heartburn, nausea and vomiting.   Musculoskeletal:  Negative for arthralgias and myalgias.   Skin:  Negative for rash.   Allergic/Immunologic: Positive for environmental allergies.   Neurological:  Negative for dizziness, light-headedness and headaches.        OBJECTIVE:  /82 (Site: Left Upper Arm, Position: Sitting, Cuff Size: Large Adult)   Pulse (!) 111   Temp 98.3 °F (36.8 °C) (Oral)   Resp 18   SpO2 99%      No results found for this visit on 03/06/23.    Physical Exam  Vitals reviewed.   Constitutional:       General: She is awake. She is not in acute distress.     Appearance:  Normal appearance. She is well-developed and well-groomed. HENT:      Head: Normocephalic. Right Ear: Hearing, ear canal and external ear normal. No drainage, swelling or tenderness. A middle ear effusion is present. Left Ear: Hearing, ear canal and external ear normal. No drainage, swelling or tenderness. A middle ear effusion is present. Nose: Mucosal edema and rhinorrhea present. Rhinorrhea is clear. Right Turbinates: Swollen and pale. Left Turbinates: Swollen and pale. Right Sinus: No maxillary sinus tenderness or frontal sinus tenderness. Left Sinus: No maxillary sinus tenderness or frontal sinus tenderness. Mouth/Throat:      Mouth: Mucous membranes are moist.      Pharynx: Oropharynx is clear. Uvula midline. No pharyngeal swelling or posterior oropharyngeal erythema. Tonsils: No tonsillar exudate. Comments: PND with cobblestoning  Eyes:      General: Lids are normal. No allergic shiner. Conjunctiva/sclera: Conjunctivae normal.      Right eye: Right conjunctiva is not injected. No chemosis. Left eye: Left conjunctiva is not injected. No chemosis. Neck:      Thyroid: No thyromegaly. Trachea: Trachea normal.   Cardiovascular:      Rate and Rhythm: Normal rate and regular rhythm. Heart sounds: Normal heart sounds. Pulmonary:      Effort: Pulmonary effort is normal.      Breath sounds: Normal breath sounds. Musculoskeletal:      Cervical back: Normal range of motion and neck supple. Lymphadenopathy:      Head:      Right side of head: No submental, submandibular, tonsillar, preauricular, posterior auricular or occipital adenopathy. Left side of head: No submental, submandibular, tonsillar, preauricular, posterior auricular or occipital adenopathy. Skin:     General: Skin is warm and dry. Neurological:      Mental Status: She is alert and oriented to person, place, and time.    Psychiatric:         Behavior: Behavior is cooperative. ASSESSMENT and PLAN    Naima was seen today for cough. Diagnoses and all orders for this visit:    Seasonal allergic rhinitis due to pollen  Comments:  Start daily Fluticasone and montelukast in addition to cetrizine for better allergy management with rising tree pollen counts. RTC as needed  Orders:  -     fluticasone (FLONASE) 50 MCG/ACT nasal spray; 1 spray by Each Nostril route daily    Allergic cough  Comments:  Montelukast daily and albuterol inhaler as needed will help relieve cough, chest tightness, and wheezing when  they occur. May need pulmonary referral for PFTs  Orders:  -     albuterol sulfate HFA (VENTOLIN HFA) 108 (90 Base) MCG/ACT inhaler; Inhale 2 puffs into the lungs 4 times daily as needed for Wheezing  -     montelukast (SINGULAIR) 10 MG tablet; Take 1 tablet by mouth daily      Counseled on benefits of having a primary care provider which includes, but is not limited to, continuity of care and having a medical home when concerns arise. Also enforced that onsite clinic policy states that we are not to take the place of a primary care provider, pt verbalized understanding. SEs and risk vs benefits associated with medications prescribed discussed with patient who verbalized understanding. Pt verbalized understanding and agreement with plan of care. RTC for persisting/worsening symptoms or new complaints that arise. Discussed signs and symptoms that would warrant immediate evaluation including, but not limited to HA, blurred vision, speech disturbance, difficulty with ambulation/gait, numbness, tingling, weakness, syncope, chest pain, or shortness of breath. I have reviewed the patient's medication list, past medical, family, social, and surgical history in detail and updated the patient record appropriately.     Souleymane Gray, APRN - CNP

## 2023-03-15 ENCOUNTER — OFFICE VISIT (OUTPATIENT)
Dept: OCCUPATIONAL MEDICINE | Age: 43
End: 2023-03-15

## 2023-03-15 VITALS
HEART RATE: 103 BPM | RESPIRATION RATE: 20 BRPM | TEMPERATURE: 98.7 F | DIASTOLIC BLOOD PRESSURE: 85 MMHG | OXYGEN SATURATION: 98 % | SYSTOLIC BLOOD PRESSURE: 150 MMHG

## 2023-03-15 DIAGNOSIS — J30.1 SEASONAL ALLERGIC RHINITIS DUE TO POLLEN: Primary | ICD-10-CM

## 2023-03-15 DIAGNOSIS — R06.02 SHORTNESS OF BREATH: ICD-10-CM

## 2023-03-15 RX ORDER — FLUTICASONE PROPIONATE AND SALMETEROL 100; 50 UG/1; UG/1
1 POWDER RESPIRATORY (INHALATION) EVERY 12 HOURS
Qty: 1 EACH | Refills: 1 | Status: SHIPPED | OUTPATIENT
Start: 2023-03-15

## 2023-03-15 ASSESSMENT — ENCOUNTER SYMPTOMS
WHEEZING: 1
SHORTNESS OF BREATH: 1
HEMOPTYSIS: 0
SPUTUM PRODUCTION: 0
RHINORRHEA: 1
SWOLLEN GLANDS: 0
SORE THROAT: 0
CHEST TIGHTNESS: 1

## 2023-03-15 NOTE — PROGRESS NOTES
PROGRESS NOTE    SUBJECTIVE:   Nathan Gonzales is a 36 y.o. female seen for shortness of breath and wheezing after spending time outside exposed to allergens. Pt states it has improved slightly but it is still very bothersome. Pt reports using her albuterol 4 times a day now consistently. Shortness of Breath  This is a recurrent problem. The current episode started more than 1 year ago. The problem occurs daily. The problem has been unchanged. Associated symptoms include rhinorrhea and wheezing. Pertinent negatives include no chest pain, claudication, coryza, headaches, hemoptysis, rash, sore throat, sputum production or swollen glands. The symptoms are aggravated by pollens. The patient has no known risk factors for DVT/PE. She has tried beta agonist inhalers and leukotriene antagonists for the symptoms. The treatment provided mild relief. Her past medical history is significant for allergies. Current Outpatient Medications   Medication Sig Dispense Refill    fluticasone-salmeterol (WIXELA INHUB) 100-50 MCG/ACT AEPB diskus inhaler Inhale 1 puff into the lungs in the morning and 1 puff in the evening.  1 each 1    hydrOXYzine HCl (ATARAX) 50 MG tablet Take 50 mg by mouth      valsartan (DIOVAN) 80 MG tablet Take 80 mg by mouth daily      MOUNJARO 5 MG/0.5ML SOPN SC injection INJECT 5 MG SUBCUTANEOUSLY EVERY 7 DAYS      fluticasone (FLONASE) 50 MCG/ACT nasal spray 1 spray by Each Nostril route daily 1 each 3    albuterol sulfate HFA (VENTOLIN HFA) 108 (90 Base) MCG/ACT inhaler Inhale 2 puffs into the lungs 4 times daily as needed for Wheezing 18 g 1    montelukast (SINGULAIR) 10 MG tablet Take 1 tablet by mouth daily 30 tablet 3    ibuprofen (ADVIL;MOTRIN) 600 MG tablet Take 1 tablet by mouth every 8 hours as needed for Pain 30 tablet 0    cetirizine (ZYRTEC) 10 MG tablet Take 1 tablet by mouth daily 30 tablet 3    metFORMIN (GLUCOPHAGE) 500 MG tablet TAKE 1 TABLET BY MOUTH TWICE A DAY      amLODIPine (NORVASC) 10 MG tablet Take 10 mg by mouth daily      aspirin 81 MG EC tablet Take 81 mg by mouth daily      buPROPion (WELLBUTRIN SR) 150 MG extended release tablet Take 150 mg by mouth 2 times daily      cyclobenzaprine (FLEXERIL) 10 MG tablet Take by mouth 3 times daily as needed      LORazepam (ATIVAN) 0.5 MG tablet Take 0.5 mg by mouth every 4 hours as needed. No current facility-administered medications for this visit. Allergies   Allergen Reactions    Lisinopril Other (See Comments)     Intolerance to side effects  Other reaction(s): Other- (not listed) - Allergy  Intolerance to side effects    Loracarbef Other (See Comments)     PT IS NOT ALLERGIC    Hydrocodone-Acetaminophen Nausea And Vomiting     Social History     Tobacco Use    Smoking status: Former    Smokeless tobacco: Never   Substance Use Topics    Alcohol use: Yes     Alcohol/week: 5.0 standard drinks       Review of Systems   HENT:  Positive for postnasal drip and rhinorrhea. Negative for sore throat. Respiratory:  Positive for chest tightness, shortness of breath and wheezing. Negative for hemoptysis and sputum production. Cardiovascular:  Negative for chest pain and claudication. Skin:  Negative for rash. Neurological:  Negative for headaches. OBJECTIVE:  BP (!) 150/85 (Site: Left Upper Arm)   Pulse (!) 103   Temp 98.7 °F (37.1 °C) (Oral)   Resp 20   SpO2 98%      Physical Exam  Constitutional:       General: She is awake. Appearance: Normal appearance. She is well-developed and well-groomed. She is obese. Cardiovascular:      Rate and Rhythm: Regular rhythm. Tachycardia present. Heart sounds: S1 normal and S2 normal.   Pulmonary:      Effort: Pulmonary effort is normal.      Breath sounds: Examination of the right-upper field reveals decreased breath sounds. Examination of the left-upper field reveals decreased breath sounds. Examination of the right-middle field reveals decreased breath sounds.  Examination of the left-middle field reveals decreased breath sounds. Examination of the right-lower field reveals decreased breath sounds. Examination of the left-lower field reveals decreased breath sounds. Decreased breath sounds present. Neurological:      Mental Status: She is alert. Psychiatric:         Behavior: Behavior is cooperative. ASSESSMENT and PLAN    Naima was seen today for shortness of breath. Diagnoses and all orders for this visit:    Seasonal allergic rhinitis due to pollen  -     [facility]    Patient Education:  Discussed need to avoid/limit exposure to allergens. Change air filter monthly. Wear mask while doing yard work. Keep pets off bed during bedtime hours. Vacuum carpet weekly. Leave windows closed. Use Nasal spray after showering. Shortness of breath  -     [facility]  -     fluticasone-salmeterol (WIXELA INHUB) 100-50 MCG/ACT AEPB diskus inhaler; Inhale 1 puff into the lungs in the morning and 1 puff in the evening. Referral to [facility] for allergy testing and PFTs. Prescribed wixela bid for wheezing and shortness of breath. SUBJECTIVE:     Additional suggestions to patient: push fluids, rest, and use a vaporizer prn. Counseled on benefits of having a primary care provider which includes, but is not limited to, continuity of care and having a medical home when concerns arise. Also enforced that onsite clinic policy states that we are not to take the place of a primary care provider, pt verbalized understanding. SEs and risk vs benefits associated with medications prescribed discussed with patient who verbalized understanding. Pt verbalized understanding and agreement with plan of care. RTC for persisting/worsening symptoms or new complaints that arise.  Discussed signs and symptoms that would warrant immediate evaluation including, but not limited to HA, blurred vision, speech disturbance, difficulty with ambulation/gait, numbness, tingling, weakness, syncope, chest pain, or shortness of breath. I have reviewed the patient's medication list, past medical, family, social, and surgical history in detail and updated the patient record appropriately.     ISSA Zhang - NP

## 2023-03-29 ENCOUNTER — OFFICE VISIT (OUTPATIENT)
Dept: OCCUPATIONAL MEDICINE | Age: 43
End: 2023-03-29

## 2023-03-29 VITALS
OXYGEN SATURATION: 99 % | TEMPERATURE: 98.1 F | RESPIRATION RATE: 16 BRPM | DIASTOLIC BLOOD PRESSURE: 98 MMHG | HEART RATE: 80 BPM | SYSTOLIC BLOOD PRESSURE: 148 MMHG

## 2023-03-29 DIAGNOSIS — R07.9 CHEST PAIN, UNSPECIFIED TYPE: Primary | ICD-10-CM

## 2023-03-29 DIAGNOSIS — R11.0 NAUSEA: ICD-10-CM

## 2023-03-29 ASSESSMENT — ENCOUNTER SYMPTOMS
ORTHOPNEA: 0
HEMOPTYSIS: 0
NAUSEA: 1
ABDOMINAL PAIN: 0
BACK PAIN: 0
COUGH: 0
DIARRHEA: 0
PHOTOPHOBIA: 0
SPUTUM PRODUCTION: 0
SHORTNESS OF BREATH: 0
VOMITING: 0

## 2023-03-29 NOTE — PROGRESS NOTES
or shortness of breath. I have reviewed the patient's medication list, past medical, family, social, and surgical history in detail and updated the patient record appropriately.     Claude Zee, APRN - CNP

## 2023-07-07 ENCOUNTER — HOSPITAL ENCOUNTER (OUTPATIENT)
Dept: MAMMOGRAPHY | Age: 43
Discharge: HOME | End: 2023-07-07
Payer: COMMERCIAL

## 2023-07-07 DIAGNOSIS — Z12.31 SCREENING MAMMOGRAM FOR HIGH-RISK PATIENT: ICD-10-CM

## 2023-07-07 PROCEDURE — 77063 BREAST TOMOSYNTHESIS BI: CPT

## 2023-08-14 ENCOUNTER — OFFICE VISIT (OUTPATIENT)
Dept: OCCUPATIONAL MEDICINE | Age: 43
End: 2023-08-14

## 2023-08-14 VITALS
HEART RATE: 91 BPM | TEMPERATURE: 98 F | OXYGEN SATURATION: 99 % | RESPIRATION RATE: 16 BRPM | SYSTOLIC BLOOD PRESSURE: 132 MMHG | DIASTOLIC BLOOD PRESSURE: 98 MMHG

## 2023-08-14 DIAGNOSIS — R09.82 ALLERGIC RHINITIS WITH POSTNASAL DRIP: Primary | ICD-10-CM

## 2023-08-14 DIAGNOSIS — J02.9 SORE THROAT: ICD-10-CM

## 2023-08-14 DIAGNOSIS — J30.9 ALLERGIC RHINITIS WITH POSTNASAL DRIP: Primary | ICD-10-CM

## 2023-08-14 LAB
GROUP A STREP ANTIGEN, POC: NEGATIVE
VALID INTERNAL CONTROL, POC: YES

## 2023-08-14 RX ORDER — HYDROXYZINE 50 MG/1
50 TABLET, FILM COATED ORAL
COMMUNITY
Start: 2023-06-14

## 2023-08-14 RX ORDER — MELOXICAM 15 MG/1
15 TABLET ORAL DAILY
COMMUNITY
Start: 2023-05-19

## 2023-08-14 ASSESSMENT — ENCOUNTER SYMPTOMS
EYE PAIN: 0
SWOLLEN GLANDS: 0
SINUS PAIN: 0
EYE REDNESS: 0
CHANGE IN BOWEL HABIT: 0
ABDOMINAL PAIN: 0
VOMITING: 0
EYE ITCHING: 0
SINUS PRESSURE: 0
RHINORRHEA: 0
VISUAL CHANGE: 0
COUGH: 0
SORE THROAT: 1
NAUSEA: 0
EYE DISCHARGE: 0

## 2023-08-14 NOTE — PROGRESS NOTES
Exam  Vitals reviewed. Constitutional:       General: She is awake. She is not in acute distress. Appearance: Normal appearance. She is well-developed and well-groomed. HENT:      Head: Normocephalic. Right Ear: Hearing, ear canal and external ear normal. No drainage, swelling or tenderness. A middle ear effusion is present. Left Ear: Hearing, ear canal and external ear normal. No drainage, swelling or tenderness. A middle ear effusion is present. Nose: Mucosal edema and rhinorrhea present. Rhinorrhea is clear. Right Turbinates: Swollen and pale. Left Turbinates: Swollen and pale. Right Sinus: No maxillary sinus tenderness or frontal sinus tenderness. Left Sinus: No maxillary sinus tenderness or frontal sinus tenderness. Mouth/Throat:      Mouth: Mucous membranes are moist.      Pharynx: Oropharynx is clear. Uvula midline. No pharyngeal swelling or posterior oropharyngeal erythema. Tonsils: No tonsillar exudate. Comments: PND with cobblestoning  Eyes:      General: Lids are normal. No allergic shiner. Conjunctiva/sclera: Conjunctivae normal.      Right eye: Right conjunctiva is not injected. No chemosis. Left eye: Left conjunctiva is not injected. No chemosis. Neck:      Thyroid: No thyromegaly. Trachea: Trachea normal.   Cardiovascular:      Rate and Rhythm: Normal rate and regular rhythm. Heart sounds: Normal heart sounds. Pulmonary:      Effort: Pulmonary effort is normal.      Breath sounds: Normal breath sounds. Musculoskeletal:      Cervical back: Normal range of motion and neck supple. Lymphadenopathy:      Head:      Right side of head: No submental, submandibular, tonsillar, preauricular, posterior auricular or occipital adenopathy. Left side of head: No submental, submandibular, tonsillar, preauricular, posterior auricular or occipital adenopathy. Skin:     General: Skin is warm and dry.    Neurological:      Mental

## 2023-11-02 ENCOUNTER — OFFICE VISIT (OUTPATIENT)
Dept: OCCUPATIONAL MEDICINE | Age: 43
End: 2023-11-02

## 2023-11-02 VITALS
HEIGHT: 64 IN | SYSTOLIC BLOOD PRESSURE: 136 MMHG | WEIGHT: 288.2 LBS | HEART RATE: 73 BPM | DIASTOLIC BLOOD PRESSURE: 82 MMHG | BODY MASS INDEX: 49.2 KG/M2

## 2023-11-02 DIAGNOSIS — Z00.8 ENCOUNTER FOR BIOMETRIC SCREENING: Primary | ICD-10-CM

## 2023-11-02 PROBLEM — M99.05 SOMATIC DYSFUNCTION OF HIP REGION: Status: ACTIVE | Noted: 2023-08-21

## 2023-11-02 PROBLEM — M54.9 MID BACK PAIN, CHRONIC: Status: ACTIVE | Noted: 2023-08-21

## 2023-11-02 PROBLEM — M99.02 SOMATIC DYSFUNCTION OF SPINE, THORACIC: Status: ACTIVE | Noted: 2023-08-21

## 2023-11-02 PROBLEM — M25.569 KNEE PAIN: Status: ACTIVE | Noted: 2021-07-15

## 2023-11-02 PROBLEM — G89.29 MID BACK PAIN, CHRONIC: Status: ACTIVE | Noted: 2023-08-21

## 2023-11-02 PROBLEM — J30.1 SEASONAL ALLERGIC RHINITIS DUE TO POLLEN: Status: ACTIVE | Noted: 2021-06-30

## 2023-11-02 PROBLEM — M99.01 SOMATIC DYSFUNCTION OF CERVICAL REGION: Status: ACTIVE | Noted: 2023-08-21

## 2023-11-03 LAB
CHOLEST SERPL-MCNC: 194 MG/DL (ref 100–199)
CHOLEST/HDLC SERPL: 3.1 RATIO (ref 0–4.4)
GLUCOSE SERPL-MCNC: 82 MG/DL (ref 70–99)
HBA1C MFR BLD: 5.6 % (ref 4.8–5.6)
HDLC SERPL-MCNC: 62 MG/DL
LDLC SERPL CALC-MCNC: 107 MG/DL (ref 0–99)
TRIGL SERPL-MCNC: 144 MG/DL (ref 0–149)
VLDLC SERPL CALC-MCNC: 25 MG/DL (ref 5–40)

## 2023-12-13 ENCOUNTER — OFFICE VISIT (OUTPATIENT)
Dept: OCCUPATIONAL MEDICINE | Age: 43
End: 2023-12-13

## 2023-12-13 VITALS — DIASTOLIC BLOOD PRESSURE: 98 MMHG | HEART RATE: 96 BPM | SYSTOLIC BLOOD PRESSURE: 144 MMHG

## 2023-12-13 DIAGNOSIS — Z01.30 BLOOD PRESSURE CHECK: Primary | ICD-10-CM

## 2023-12-13 RX ORDER — LIDOCAINE 50 MG/G
PATCH TOPICAL
COMMUNITY
Start: 2023-11-15

## 2023-12-13 ASSESSMENT — ENCOUNTER SYMPTOMS
NAUSEA: 0
PHOTOPHOBIA: 0
VOMITING: 0
SHORTNESS OF BREATH: 0

## 2023-12-13 NOTE — PROGRESS NOTES
Coordination normal.      Gait: Gait is intact. Gait normal.   Psychiatric:         Behavior: Behavior is cooperative. ASSESSMENT and PLAN    Naima was seen today for blood pressure check. Diagnoses and all orders for this visit:    Blood pressure check  Comments:  Suspect blurred vision related to the contacts more than the blood pressure. Reports it has improved since arriving to work. If persists, contact eye doctor        Counseled on benefits of having a primary care provider which includes, but is not limited to, continuity of care and having a medical home when concerns arise. Also enforced that onsite clinic policy states that we are not to take the place of a primary care provider, pt verbalized understanding. SEs and risk vs benefits associated with medications prescribed discussed with patient who verbalized understanding. Pt verbalized understanding and agreement with plan of care. RTC for persisting/worsening symptoms or new complaints that arise. Discussed signs and symptoms that would warrant immediate evaluation including, but not limited to HA, blurred vision, speech disturbance, difficulty with ambulation/gait, numbness, tingling, weakness, syncope, chest pain, or shortness of breath. I have reviewed the patient's medication list, past medical, family, social, and surgical history in detail and updated the patient record appropriately.     Shaun Elam, APRN - CNP

## 2024-01-03 ENCOUNTER — OFFICE VISIT (OUTPATIENT)
Age: 44
End: 2024-01-03

## 2024-01-03 VITALS
RESPIRATION RATE: 16 BRPM | HEART RATE: 68 BPM | SYSTOLIC BLOOD PRESSURE: 110 MMHG | DIASTOLIC BLOOD PRESSURE: 80 MMHG | OXYGEN SATURATION: 98 % | TEMPERATURE: 98.2 F

## 2024-01-03 DIAGNOSIS — R68.89 FLU-LIKE SYMPTOMS: Primary | ICD-10-CM

## 2024-01-03 PROBLEM — M25.511 PAIN IN RIGHT SHOULDER: Status: ACTIVE | Noted: 2023-12-08

## 2024-01-03 LAB
INFLUENZA A ANTIGEN, POC: NEGATIVE
INFLUENZA B ANTIGEN, POC: NEGATIVE
SARS-COV-2 RNA, POC: NEGATIVE

## 2024-01-03 ASSESSMENT — ENCOUNTER SYMPTOMS
COUGH: 0
EYE ITCHING: 0
DIARRHEA: 1
SORE THROAT: 1
RHINORRHEA: 0
EYE REDNESS: 0
ABDOMINAL PAIN: 0
SINUS PRESSURE: 0
EYE PAIN: 0
EYE DISCHARGE: 0
WHEEZING: 0
SWOLLEN GLANDS: 1
SINUS PAIN: 0
NAUSEA: 0
VOMITING: 0

## 2024-01-03 NOTE — PROGRESS NOTES
occipital adenopathy.   Skin:     General: Skin is warm and dry.   Neurological:      Mental Status: She is alert and oriented to person, place, and time.   Psychiatric:         Behavior: Behavior is cooperative.         ASSESSMENT and PLAN    Naima was seen today for uri.    Diagnoses and all orders for this visit:    Flu-like symptoms  Comments:  COVID/Flu negative at this time. Encouraged rest, hydration, and OTC therapies per packaging for relief. RTC as needed, may need retesting in 24-48°  Orders:  -     AMB POC SARS-COV-2 AND INFLUENZA A/B        Counseled on benefits of having a primary care provider which includes, but is not limited to, continuity of care and having a medical home when concerns arise. Also enforced that onsite clinic policy states that we are not to take the place of a primary care provider, pt verbalized understanding.     SEs and risk vs benefits associated with medications prescribed discussed with patient who verbalized understanding. Pt verbalized understanding and agreement with plan of care. RTC for persisting/worsening symptoms or new complaints that arise. Discussed signs and symptoms that would warrant immediate evaluation including, but not limited to HA, blurred vision, speech disturbance, difficulty with ambulation/gait, numbness, tingling, weakness, syncope, chest pain, or shortness of breath.    I have reviewed the patient's medication list, past medical, family, social, and surgical history in detail and updated the patient record appropriately.    Wayne Granger, APRN - CNP

## 2024-01-05 ENCOUNTER — OFFICE VISIT (OUTPATIENT)
Age: 44
End: 2024-01-05

## 2024-01-05 VITALS
DIASTOLIC BLOOD PRESSURE: 118 MMHG | HEART RATE: 88 BPM | RESPIRATION RATE: 16 BRPM | SYSTOLIC BLOOD PRESSURE: 168 MMHG | TEMPERATURE: 98.4 F | OXYGEN SATURATION: 99 %

## 2024-01-05 DIAGNOSIS — R68.89 FLU-LIKE SYMPTOMS: Primary | ICD-10-CM

## 2024-01-05 ASSESSMENT — ENCOUNTER SYMPTOMS
SWOLLEN GLANDS: 1
COUGH: 0
DIARRHEA: 1
SINUS PRESSURE: 0
SINUS PAIN: 0
SORE THROAT: 1
VOMITING: 0
ABDOMINAL PAIN: 0
RHINORRHEA: 0
EYE PAIN: 0
WHEEZING: 0
EYE DISCHARGE: 0
EYE REDNESS: 0
NAUSEA: 0
EYE ITCHING: 0

## 2024-01-05 NOTE — PROGRESS NOTES
metFORMIN (GLUCOPHAGE) 500 MG tablet TAKE 1 TABLET BY MOUTH TWICE A DAY      amLODIPine (NORVASC) 10 MG tablet Take 10 mg by mouth daily      aspirin 81 MG EC tablet Take 81 mg by mouth daily      buPROPion (WELLBUTRIN SR) 150 MG extended release tablet Take 150 mg by mouth 2 times daily      cyclobenzaprine (FLEXERIL) 10 MG tablet Take by mouth 3 times daily as needed      LORazepam (ATIVAN) 0.5 MG tablet Take 0.5 mg by mouth every 4 hours as needed.       No current facility-administered medications for this visit.      Allergies   Allergen Reactions    Lisinopril Other (See Comments)     Intolerance to side effects  Other reaction(s): Other- (not listed) - Allergy  Intolerance to side effects    Loracarbef Other (See Comments)     PT IS NOT ALLERGIC    Hydrocodone-Acetaminophen Nausea And Vomiting       Social History     Tobacco Use    Smoking status: Former    Smokeless tobacco: Never   Substance Use Topics    Alcohol use: Yes     Alcohol/week: 5.0 standard drinks of alcohol    Drug use: No        Review of Systems   Constitutional:  Positive for chills and fatigue. Negative for fever.   HENT:  Positive for congestion, postnasal drip and sore throat. Negative for ear pain, rhinorrhea, sinus pressure, sinus pain and sneezing.    Eyes:  Negative for pain, discharge, redness and itching.   Respiratory:  Negative for cough and wheezing.    Cardiovascular:  Negative for chest pain.   Gastrointestinal:  Positive for diarrhea (resolved since last visit). Negative for abdominal pain, nausea and vomiting.   Genitourinary:  Negative for dysuria.   Musculoskeletal:  Positive for myalgias. Negative for arthralgias, joint pain and neck pain.   Skin:  Negative for rash.   Neurological:  Negative for dizziness, light-headedness and headaches.          OBJECTIVE:  BP (!) 168/118 (Site: Left Upper Arm, Position: Sitting, Cuff Size: Large Adult)   Pulse 88   Temp 98.4 °F (36.9 °C) (Oral)   Resp 16   SpO2 99%      Results for

## 2024-03-05 DIAGNOSIS — R05.8 ALLERGIC COUGH: ICD-10-CM

## 2024-03-05 DIAGNOSIS — J00 ACUTE RHINITIS: ICD-10-CM

## 2024-03-05 RX ORDER — CETIRIZINE HYDROCHLORIDE 10 MG/1
10 TABLET ORAL DAILY
Qty: 30 TABLET | Refills: 5 | Status: SHIPPED | OUTPATIENT
Start: 2024-03-05

## 2024-03-05 RX ORDER — ALBUTEROL SULFATE 90 UG/1
2 AEROSOL, METERED RESPIRATORY (INHALATION) 4 TIMES DAILY PRN
Qty: 18 G | Refills: 1 | Status: SHIPPED | OUTPATIENT
Start: 2024-03-05

## 2024-03-05 NOTE — TELEPHONE ENCOUNTER
Patient contacted clinic requesting medication refill of their cetirizine and albuterol therapy for allergy and allergic cough management.  Will provide refills to pharmacy on file as requested. RTC as needed for complaints or concerns that may arise    Wayne Granger, ISSA - CNP

## 2024-07-06 DIAGNOSIS — J00 ACUTE RHINITIS: ICD-10-CM

## 2024-07-08 RX ORDER — CETIRIZINE HYDROCHLORIDE 10 MG/1
10 TABLET ORAL DAILY
Qty: 90 TABLET | Refills: 1 | Status: SHIPPED | OUTPATIENT
Start: 2024-07-08

## 2024-07-17 ENCOUNTER — OFFICE VISIT (OUTPATIENT)
Age: 44
End: 2024-07-17

## 2024-07-17 VITALS
SYSTOLIC BLOOD PRESSURE: 146 MMHG | DIASTOLIC BLOOD PRESSURE: 96 MMHG | OXYGEN SATURATION: 98 % | HEART RATE: 95 BPM | RESPIRATION RATE: 18 BRPM | TEMPERATURE: 98.2 F

## 2024-07-17 DIAGNOSIS — R42 FEELING FAINT: Primary | ICD-10-CM

## 2024-07-17 PROBLEM — E66.813 CLASS 3 SEVERE OBESITY DUE TO EXCESS CALORIES WITH SERIOUS COMORBIDITY AND BODY MASS INDEX (BMI) OF 50.0 TO 59.9 IN ADULT: Status: ACTIVE | Noted: 2017-12-18

## 2024-07-17 PROBLEM — N93.9 ABNORMAL UTERINE BLEEDING (AUB): Status: RESOLVED | Noted: 2017-11-09 | Resolved: 2024-07-17

## 2024-07-17 PROBLEM — E66.01 CLASS 3 SEVERE OBESITY DUE TO EXCESS CALORIES WITH SERIOUS COMORBIDITY AND BODY MASS INDEX (BMI) OF 50.0 TO 59.9 IN ADULT (HCC): Status: ACTIVE | Noted: 2017-12-18

## 2024-07-17 PROBLEM — Z72.3 LACK OF PHYSICAL ACTIVITY: Status: ACTIVE | Noted: 2024-02-02

## 2024-07-17 RX ORDER — BUPROPION HYDROCHLORIDE 300 MG/1
300 TABLET ORAL DAILY
COMMUNITY
Start: 2024-06-22

## 2024-07-17 ASSESSMENT — ENCOUNTER SYMPTOMS
CHEST TIGHTNESS: 0
SORE THROAT: 0
SHORTNESS OF BREATH: 0
CHANGE IN BOWEL HABIT: 0
EYE REDNESS: 0
SWOLLEN GLANDS: 0
EYE ITCHING: 0
VISUAL CHANGE: 0
NAUSEA: 0
RHINORRHEA: 0
BACK PAIN: 0
PHOTOPHOBIA: 0
EYE DISCHARGE: 0
VOMITING: 0
ABDOMINAL PAIN: 0
WHEEZING: 0
DIARRHEA: 0
COUGH: 0
SINUS PRESSURE: 0
EYE PAIN: 0
SINUS PAIN: 0

## 2024-07-17 NOTE — PROGRESS NOTES
PROGRESS NOTE    SUBJECTIVE:   Naima Kimble is a 44 y.o. female seen for feeling fatigues, sluggish, and \"like I'm going to pass out\" for past 4 days.     Chief Complaint    Fatigue         Fatigue  This is a new problem. Episode onset: 4 days. The problem has been waxing and waning. Associated symptoms include fatigue and weakness. Pertinent negatives include no abdominal pain, anorexia, arthralgias, change in bowel habit, chest pain, chills, congestion, coughing, diaphoresis, fever, headaches, joint swelling, myalgias, nausea, neck pain, numbness, rash, sore throat, swollen glands, urinary symptoms, vertigo, visual change or vomiting. Nothing aggravates the symptoms. She has tried nothing for the symptoms.       Current Outpatient Medications   Medication Sig Dispense Refill    buPROPion (WELLBUTRIN XL) 300 MG extended release tablet Take 1 tablet by mouth daily      cetirizine (ZYRTEC) 10 MG tablet TAKE 1 TABLET BY MOUTH EVERY DAY 90 tablet 1    albuterol sulfate HFA (VENTOLIN HFA) 108 (90 Base) MCG/ACT inhaler Inhale 2 puffs into the lungs 4 times daily as needed for Wheezing 18 g 1    lidocaine (LIDODERM) 5 % PLACE 1 PATCH ON THE SKIN DAILY REMOVE & DISCARD PATCH WITHIN 12 HOURS OR AS DIRECTED BY MD      hydrOXYzine HCl (ATARAX) 50 MG tablet Take 1 tablet by mouth      meloxicam (MOBIC) 15 MG tablet Take 1 tablet by mouth daily      valsartan (DIOVAN) 80 MG tablet Take 80 mg by mouth daily      MOUNJARO 5 MG/0.5ML SOPN SC injection INJECT 5 MG SUBCUTANEOUSLY EVERY 7 DAYS      fluticasone (FLONASE) 50 MCG/ACT nasal spray 1 spray by Each Nostril route daily 1 each 3    montelukast (SINGULAIR) 10 MG tablet Take 1 tablet by mouth daily 30 tablet 3    ibuprofen (ADVIL;MOTRIN) 600 MG tablet Take 1 tablet by mouth every 8 hours as needed for Pain 30 tablet 0    amLODIPine (NORVASC) 10 MG tablet Take 10 mg by mouth daily      aspirin 81 MG EC tablet Take 81 mg by mouth daily      LORazepam (ATIVAN) 0.5 MG tablet

## 2024-09-04 ENCOUNTER — OFFICE VISIT (OUTPATIENT)
Age: 44
End: 2024-09-04

## 2024-09-04 VITALS
OXYGEN SATURATION: 98 % | HEART RATE: 91 BPM | SYSTOLIC BLOOD PRESSURE: 134 MMHG | DIASTOLIC BLOOD PRESSURE: 90 MMHG | RESPIRATION RATE: 16 BRPM | TEMPERATURE: 98.5 F

## 2024-09-04 DIAGNOSIS — R53.83 FATIGUE, UNSPECIFIED TYPE: Primary | ICD-10-CM

## 2024-09-04 PROBLEM — M54.50 CHRONIC LEFT-SIDED LOW BACK PAIN WITHOUT SCIATICA: Status: ACTIVE | Noted: 2023-08-21

## 2024-09-04 RX ORDER — TIRZEPATIDE 7.5 MG/.5ML
INJECTION, SOLUTION SUBCUTANEOUS
COMMUNITY
Start: 2024-08-20

## 2024-09-04 ASSESSMENT — ENCOUNTER SYMPTOMS
CHEST TIGHTNESS: 0
SINUS PAIN: 0
EYE PAIN: 0
SORE THROAT: 0
EYE ITCHING: 0
DIARRHEA: 0
ABDOMINAL PAIN: 0
SINUS PRESSURE: 0
EYE REDNESS: 0
NAUSEA: 0
VOMITING: 0
EYE DISCHARGE: 0
SHORTNESS OF BREATH: 0
RHINORRHEA: 0

## 2024-09-04 NOTE — PROGRESS NOTES
of a primary care provider, pt verbalized understanding.     SEs and risk vs benefits associated with medications prescribed discussed with patient who verbalized understanding. Pt verbalized understanding and agreement with plan of care. RTC for persisting/worsening symptoms or new complaints that arise. Discussed signs and symptoms that would warrant immediate evaluation including, but not limited to HA, blurred vision, speech disturbance, difficulty with ambulation/gait, numbness, tingling, weakness, syncope, chest pain, or shortness of breath.    I have reviewed the patient's medication list, past medical, family, social, and surgical history in detail and updated the patient record appropriately.    Wayne Granger, APRN - CNP

## 2024-11-04 NOTE — PROGRESS NOTES
PROGRESS NOTE    SUBJECTIVE:   Naima Kimble is a 44 y.o. female seen in the employer based health center located at [facility] for employment physical for annual employer Health Risk Assessment and biometric screening as required to obtain their insurance premium discounts.  No complaints or concerns at this time.     Chief Complaint    Health Assessment           OBJECTIVE:  /84 (Site: Left Upper Arm, Position: Sitting, Cuff Size: Large Adult)   Pulse 93   Ht 1.626 m (5' 4\")   Wt 126 kg (277 lb 12.8 oz)   BMI 47.68 kg/m²    Additional Measurements    11/07/24 0806   Waist (Inches): 50 in        Physical Exam  Vitals reviewed.   Constitutional:       General: She is awake. She is not in acute distress.     Appearance: Normal appearance. She is well-developed and well-groomed.      Comments: Successful venipuncture of the LAC performed, pt tolerated procedure well   Cardiovascular:      Rate and Rhythm: Normal rate and regular rhythm.      Heart sounds: Normal heart sounds.   Pulmonary:      Effort: Pulmonary effort is normal.   Neurological:      Mental Status: She is alert.   Psychiatric:         Behavior: Behavior is cooperative.       ASSESSMENT and PLAN    Naima \"Naima Kimble\" was seen today for health assessment.    Diagnoses and all orders for this visit:    Encounter for biometric screening  -     Glucose, Random  -     Lipid Panel W/ Chol/HDL Ratio  -     Hemoglobin A1C  -     COLLECTION VENOUS BLOOD,VENIPUNCTURE        Discussed HR notification process for credits and that I will contact with results when available    Counseled on benefits of having a primary care provider which includes, but is not limited to, continuity of care and having a medical home when concerns arise. Also enforced that onsite clinic policy states that we are not to take the place of a primary care provider, pt verbalized understanding.     I have reviewed the patient's medication list, past medical,

## 2024-11-07 ENCOUNTER — OFFICE VISIT (OUTPATIENT)
Age: 44
End: 2024-11-07

## 2024-11-07 VITALS
BODY MASS INDEX: 47.43 KG/M2 | HEART RATE: 93 BPM | SYSTOLIC BLOOD PRESSURE: 124 MMHG | DIASTOLIC BLOOD PRESSURE: 84 MMHG | WEIGHT: 277.8 LBS | HEIGHT: 64 IN

## 2024-11-07 DIAGNOSIS — Z00.8 ENCOUNTER FOR BIOMETRIC SCREENING: Primary | ICD-10-CM

## 2024-11-07 PROBLEM — F41.1 GAD (GENERALIZED ANXIETY DISORDER): Status: ACTIVE | Noted: 2019-10-01

## 2024-11-08 LAB
CHOLEST SERPL-MCNC: 197 MG/DL (ref 100–199)
CHOLEST/HDLC SERPL: 3.9 RATIO (ref 0–4.4)
GLUCOSE SERPL-MCNC: 82 MG/DL (ref 70–99)
HBA1C MFR BLD: 5.6 % (ref 4.8–5.6)
HDLC SERPL-MCNC: 51 MG/DL
LDLC SERPL CALC-MCNC: 106 MG/DL (ref 0–99)
TRIGL SERPL-MCNC: 233 MG/DL (ref 0–149)
VLDLC SERPL CALC-MCNC: 40 MG/DL (ref 5–40)

## 2025-01-03 ENCOUNTER — OFFICE VISIT (OUTPATIENT)
Age: 45
End: 2025-01-03

## 2025-01-03 VITALS
TEMPERATURE: 97.6 F | HEART RATE: 78 BPM | RESPIRATION RATE: 16 BRPM | SYSTOLIC BLOOD PRESSURE: 144 MMHG | OXYGEN SATURATION: 98 % | DIASTOLIC BLOOD PRESSURE: 98 MMHG

## 2025-01-03 DIAGNOSIS — J32.9 RHINOSINUSITIS: Primary | ICD-10-CM

## 2025-01-03 LAB
GROUP A STREP ANTIGEN, POC: NEGATIVE
INFLUENZA A ANTIGEN, POC: NEGATIVE
INFLUENZA B ANTIGEN, POC: NEGATIVE
LOT EXPIRE DATE: NORMAL
LOT KIT NUMBER: NORMAL
SARS-COV-2 RNA, POC: NEGATIVE
VALID INTERNAL CONTROL, POC: YES
VALID INTERNAL CONTROL: YES
VENDOR AND KIT NAME POC: NORMAL

## 2025-01-03 RX ORDER — TIRZEPATIDE 10 MG/.5ML
INJECTION, SOLUTION SUBCUTANEOUS
COMMUNITY
Start: 2024-12-21

## 2025-01-03 ASSESSMENT — ENCOUNTER SYMPTOMS
SWOLLEN GLANDS: 1
DIARRHEA: 0
RHINORRHEA: 0
EYE ITCHING: 0
WHEEZING: 0
SINUS PAIN: 0
EYE REDNESS: 0
NAUSEA: 0
SORE THROAT: 1
SINUS PRESSURE: 0
VOMITING: 0
EYE PAIN: 0
EYE DISCHARGE: 0
VOICE CHANGE: 1
COUGH: 0
ABDOMINAL PAIN: 0

## 2025-01-03 NOTE — PROGRESS NOTES
effort is normal.      Breath sounds: Normal breath sounds.   Musculoskeletal:      Cervical back: Normal range of motion and neck supple.   Lymphadenopathy:      Head:      Right side of head: No submental, submandibular, tonsillar, preauricular, posterior auricular or occipital adenopathy.      Left side of head: No submental, submandibular, tonsillar, preauricular, posterior auricular or occipital adenopathy.   Skin:     General: Skin is warm and dry.   Neurological:      Mental Status: She is alert and oriented to person, place, and time.   Psychiatric:         Behavior: Behavior is cooperative.         ASSESSMENT and PLAN    Naima \"Naima Kimble\" was seen today for cold symptoms.    Diagnoses and all orders for this visit:    Rhinosinusitis  Comments:  COVID/Flu/strep negative at this time. Augmentin as prescribed with food. Encouraged rest, hydration, and OTC therapies per packaging for relief. RTC as needed  Orders:  -     AMB POC RAPID STREP A  -     AMB POC SARS-COV-2 AND INFLUENZA A/B  -     amoxicillin-clavulanate (AUGMENTIN) 875-125 MG per tablet; Take 1 tablet by mouth 2 times daily for 5 days        Counseled on benefits of having a primary care provider which includes, but is not limited to, continuity of care and having a medical home when concerns arise. Also enforced that onsite clinic policy states that we are not to take the place of a primary care provider, pt verbalized understanding.     SEs and risk vs benefits associated with medications prescribed discussed with patient who verbalized understanding. Pt verbalized understanding and agreement with plan of care. RTC for persisting/worsening symptoms or new complaints that arise. Discussed signs and symptoms that would warrant immediate evaluation including, but not limited to HA, blurred vision, speech disturbance, difficulty with ambulation/gait, numbness, tingling, weakness, syncope, chest pain, or shortness of breath.    I have reviewed

## 2025-02-20 ENCOUNTER — OFFICE VISIT (OUTPATIENT)
Age: 45
End: 2025-02-20

## 2025-02-20 VITALS
HEART RATE: 93 BPM | TEMPERATURE: 98 F | RESPIRATION RATE: 18 BRPM | DIASTOLIC BLOOD PRESSURE: 88 MMHG | OXYGEN SATURATION: 98 % | SYSTOLIC BLOOD PRESSURE: 128 MMHG

## 2025-02-20 DIAGNOSIS — R35.0 URINARY FREQUENCY: ICD-10-CM

## 2025-02-20 DIAGNOSIS — N30.90 CYSTITIS: Primary | ICD-10-CM

## 2025-02-20 PROBLEM — M99.00 SOMATIC DYSFUNCTION OF OCCIPITOCERVICAL REGION: Status: ACTIVE | Noted: 2025-02-10

## 2025-02-20 LAB
BILIRUBIN, URINE, POC: NEGATIVE
BLOOD URINE, POC: ABNORMAL
GLUCOSE URINE, POC: NEGATIVE
KETONES, URINE, POC: NEGATIVE
LEUKOCYTE ESTERASE, URINE, POC: ABNORMAL
NITRITE, URINE, POC: NEGATIVE
PH, URINE, POC: 8 (ref 4.6–8)
PROTEIN,URINE, POC: ABNORMAL
SPECIFIC GRAVITY, URINE, POC: 1.01 (ref 1–1.03)
URINALYSIS CLARITY, POC: CLEAR
URINALYSIS COLOR, POC: ABNORMAL
UROBILINOGEN, POC: ABNORMAL MG/DL

## 2025-02-20 RX ORDER — NITROFURANTOIN 25; 75 MG/1; MG/1
100 CAPSULE ORAL 2 TIMES DAILY
Qty: 10 CAPSULE | Refills: 0 | Status: SHIPPED | OUTPATIENT
Start: 2025-02-20 | End: 2025-02-25

## 2025-02-20 ASSESSMENT — ENCOUNTER SYMPTOMS
BACK PAIN: 0
SHORTNESS OF BREATH: 0
VOMITING: 0
ABDOMINAL PAIN: 0
NAUSEA: 0

## 2025-02-20 NOTE — PROGRESS NOTES
PROGRESS NOTE    SUBJECTIVE:   Naima Kimble is a 44 y.o. female seen in the employer based health center located at [facility] for suspected urinary tract infection.      Chief Complaint    Urinary Frequency            History provided by:  Patient   used: No    Urinary Frequency   This is a new problem. The current episode started today. The problem has been gradually worsening. The quality of the pain is described as aching. The pain is mild. There has been no fever. Associated symptoms include frequency and urgency. Pertinent negatives include no chills, discharge, flank pain, hematuria, hesitancy, nausea, possible pregnancy, sweats or vomiting. She has tried nothing for the symptoms.         Current Outpatient Medications   Medication Sig Dispense Refill    nitrofurantoin, macrocrystal-monohydrate, (MACROBID) 100 MG capsule Take 1 capsule by mouth 2 times daily for 5 days 10 capsule 0    MOUNJARO 10 MG/0.5ML SOAJ INJECT 0.5 ML UNDER THE SKIN EVERY 7 DAYS FOR 12 DOSES      buPROPion (WELLBUTRIN XL) 300 MG extended release tablet Take 1 tablet by mouth daily      cetirizine (ZYRTEC) 10 MG tablet TAKE 1 TABLET BY MOUTH EVERY DAY 90 tablet 1    albuterol sulfate HFA (VENTOLIN HFA) 108 (90 Base) MCG/ACT inhaler Inhale 2 puffs into the lungs 4 times daily as needed for Wheezing 18 g 1    hydrOXYzine HCl (ATARAX) 50 MG tablet Take 1 tablet by mouth      meloxicam (MOBIC) 15 MG tablet Take 1 tablet by mouth daily      valsartan (DIOVAN) 80 MG tablet Take 80 mg by mouth daily      fluticasone (FLONASE) 50 MCG/ACT nasal spray 1 spray by Each Nostril route daily 1 each 3    montelukast (SINGULAIR) 10 MG tablet Take 1 tablet by mouth daily 30 tablet 3    ibuprofen (ADVIL;MOTRIN) 600 MG tablet Take 1 tablet by mouth every 8 hours as needed for Pain 30 tablet 0    amLODIPine (NORVASC) 10 MG tablet Take 10 mg by mouth daily      aspirin 81 MG EC tablet Take 81 mg by mouth daily

## 2025-03-26 DIAGNOSIS — J00 ACUTE RHINITIS: ICD-10-CM

## 2025-03-26 RX ORDER — CETIRIZINE HYDROCHLORIDE 10 MG/1
10 TABLET ORAL DAILY
Qty: 90 TABLET | Refills: 1 | Status: SHIPPED | OUTPATIENT
Start: 2025-03-26

## 2025-03-26 NOTE — TELEPHONE ENCOUNTER
Patient contacted clinic requesting medication refill of their cetirizine therapy. Will provide refills to pharmacy on file as requested. RTC as needed for complaints or concerns that may arise    Wayne Granger, ISSA - CNP

## (undated) DEVICE — DRAPE,TOP,102X53,STERILE: Brand: MEDLINE

## (undated) DEVICE — 2, DISPOSABLE SUCTION/IRRIGATOR WITHOUT DISPOSABLE TIP: Brand: STRYKEFLOW

## (undated) DEVICE — GAUZE,SPONGE,4"X4",16PLY,STRL,LF,10/TRAY: Brand: MEDLINE

## (undated) DEVICE — SUTURE VCRL SZ 3-0 L27IN ABSRB UD L26MM SH 1/2 CIR J416H

## (undated) DEVICE — SOLUTION IV 1000ML 0.9% SOD CHL

## (undated) DEVICE — DRAPE ROBOTIC CAM HD DISP ENDOWRIST DA VINCI SI

## (undated) DEVICE — 2000CC GUARDIAN II: Brand: GUARDIAN

## (undated) DEVICE — INSUFFLATION NEEDLE: Brand: SURGINEEDLE

## (undated) DEVICE — CONTAINER COLL/TRNSPRT BX BRST -- E-Z-EM CAT 8390

## (undated) DEVICE — WIRE CUTTER, STERILE (WCS144): Brand: CENTURION MEDICAL PRODUCTS CORP

## (undated) DEVICE — TRI-LUMEN FILTERED TUBE SET WITH ACTIVATED CHARCOAL FILTER: Brand: AIRSEAL

## (undated) DEVICE — (D)SYR 10ML 1/5ML GRAD NSAF -- PKGING CHANGE USE ITEM 338027

## (undated) DEVICE — PERI-PAD,MODERATE: Brand: CURITY

## (undated) DEVICE — MINOR SPLIT GENERAL: Brand: MEDLINE INDUSTRIES, INC.

## (undated) DEVICE — SOLUTION IRRIG 3000ML 0.9% SOD CHL FLX CONT 0797208] ICU MEDICAL INC]

## (undated) DEVICE — APPLIER CLP L9.375IN APER 2.1MM CLS L3.8MM 20 SM TI CLP

## (undated) DEVICE — SOLUTION IRRIG 1000ML H2O STRL BLT

## (undated) DEVICE — TRAY PREP DRY W/ PREM GLV 2 APPL 6 SPNG 2 UNDPD 1 OVERWRAP

## (undated) DEVICE — JELLY LUBRICATING 10GM PREFIL SYR LUBE

## (undated) DEVICE — REM POLYHESIVE ADULT PATIENT RETURN ELECTRODE: Brand: VALLEYLAB

## (undated) DEVICE — CARDINAL HEALTH FLEXIBLE LIGHT HANDLE COVER: Brand: CARDINAL HEALTH

## (undated) DEVICE — TIP COVER ACCESSORY

## (undated) DEVICE — SUT SLK 2-0SH 30IN BLK --

## (undated) DEVICE — PK DISSECTING FORCEPS: Brand: ENDOWRIST;DAVINCI SI

## (undated) DEVICE — DERMABOND SKIN ADH 0.7ML -- DERMABOND ADVANCED 12/BX

## (undated) DEVICE — CONTAINER SPEC HISTOLOGY 900ML POLYPR

## (undated) DEVICE — SPONGE LAP 18X18IN STRL -- 5/PK

## (undated) DEVICE — VISUALIZATION SYSTEM: Brand: CLEARIFY

## (undated) DEVICE — DRAPE INSTR ARM ROBOTIC ENDOWRIST DA VINCI S

## (undated) DEVICE — SUTURE MCRYL SZ 4-0 L27IN ABSRB UD L19MM PS-2 1/2 CIR PRIM Y426H

## (undated) DEVICE — CONTROL SYRINGE LUER-LOCK TIP: Brand: MONOJECT

## (undated) DEVICE — YANKAUER,BULB TIP,W/O VENT,RIGID,STERILE: Brand: MEDLINE

## (undated) DEVICE — CATHETER URETH 16FR BLLN 5CC SIL ALLY W/ SIL HYDRGEL 2 W F

## (undated) DEVICE — NEEDLE HYPO 21GA L1.5IN INTRAMUSCULAR S STL LATCH BVL UP

## (undated) DEVICE — LOGICUT SCISSOR LENGTH 320MM: Brand: LOGI - LAPAROSCOPIC INSTRUMENT SYSTEM

## (undated) DEVICE — BLADELESS OPTICAL TROCAR WITH FIXATION CANNULA: Brand: VERSAONE

## (undated) DEVICE — GOWN,REINF,POLY,ECL,PP SLV,XL: Brand: MEDLINE

## (undated) DEVICE — MEGA NEEDLE DRIVER: Brand: ENDOWRIST;DAVINCI SI

## (undated) DEVICE — OBTRTR BLDELSS 8MM DISP -- DA VINCI - SNGL USE

## (undated) DEVICE — MONOPOLAR CURVED SCISSORS: Brand: ENDOWRIST

## (undated) DEVICE — MASTISOL ADHESIVE LIQ 2/3ML

## (undated) DEVICE — KENDALL SCD EXPRESS SLEEVES, KNEE LENGTH, MEDIUM: Brand: KENDALL SCD

## (undated) DEVICE — LEGGINGS, PAIR, 31X48, STERILE: Brand: MEDLINE

## (undated) DEVICE — GOWN,REINFORCED,POLY,AURORA,XXLARGE,STR: Brand: MEDLINE

## (undated) DEVICE — SUTURE V-LOC 180 SZ 0 L12IN ABSRB GRN L37MM GS-21 1/2 CIR VLOCL0316

## (undated) DEVICE — SUTURE VCRL SZ 0 L36IN ABSRB UD L36MM CT-1 1/2 CIR J946H

## (undated) DEVICE — ELECTRO LUBE IS A SINGLE PATIENT USE DEVICE THAT IS INTENDED TO BE USED ON ELECTROSURGICAL ELECTRODES TO REDUCE STICKING.: Brand: KEY SURGICAL ELECTRO LUBE

## (undated) DEVICE — AIRSEAL 5 MM ACCESS PORT AND LOW PROFILE OBTURATOR WITH BLADELESS OPTICAL TIP, 120 MM LENGTH: Brand: AIRSEAL

## (undated) DEVICE — BAG DRNGE 4000ML CONT IRRIG ROUNDED TEARDROP SHP DISP

## (undated) DEVICE — (D)PREP SKN CHLRAPRP APPL 26ML -- CONVERT TO ITEM 371833

## (undated) DEVICE — APPLICATOR FBR TIP L6IN COT TIP WOOD SHFT SWAB 2000 PER CA

## (undated) DEVICE — WARMER SCP LAP

## (undated) DEVICE — COBRA GRASPER: Brand: ENDOWRIST;DAVINCI SI

## (undated) DEVICE — GARMENT,MEDLINE,DVT,INT,CALF,MED, GEN2: Brand: MEDLINE

## (undated) DEVICE — CANNULA SEAL

## (undated) DEVICE — SYR BULB 60ML IRRIGATION -- CONVERT TO ITEM 116413

## (undated) DEVICE — LAP CHOLE: Brand: MEDLINE INDUSTRIES, INC.

## (undated) DEVICE — STRIP,CLOSURE,WOUND,MEDI-STRIP,1/2X4: Brand: MEDLINE

## (undated) DEVICE — NEPTUNE E-SEP SMOKE EVACUATION PENCIL, COATED, 70MM BLADE, PUSH BUTTON SWITCH: Brand: NEPTUNE E-SEP

## (undated) DEVICE — STANDARD HYPODERMIC NEEDLE,POLYPROPYLENE HUB: Brand: MONOJECT

## (undated) DEVICE — SHEET, DRAPE, SPLIT, STERILE: Brand: MEDLINE

## (undated) DEVICE — 40585 XL ADVANCED TRENDELENBURG POSITIONING KIT: Brand: 40585 XL ADVANCED TRENDELENBURG POSITIONING KIT

## (undated) DEVICE — DRAPE ROBOTIC CAM ARM DISP ENDOWRIST DA VINCI SI

## (undated) DEVICE — ADHESIVE LIQ H2O INSOLUBLE 3 CC LO RISK N STN MASTISOL

## (undated) DEVICE — DRAPE,UNDERBUTTOCKS,PCH,STERILE: Brand: MEDLINE